# Patient Record
Sex: FEMALE | Race: WHITE | NOT HISPANIC OR LATINO | Employment: UNEMPLOYED | URBAN - METROPOLITAN AREA
[De-identification: names, ages, dates, MRNs, and addresses within clinical notes are randomized per-mention and may not be internally consistent; named-entity substitution may affect disease eponyms.]

---

## 2017-01-10 ENCOUNTER — ALLSCRIPTS OFFICE VISIT (OUTPATIENT)
Dept: OTHER | Facility: OTHER | Age: 27
End: 2017-01-10

## 2017-01-10 LAB — HCG, QUALITATIVE (HISTORICAL): NEGATIVE

## 2017-01-12 ENCOUNTER — APPOINTMENT (EMERGENCY)
Dept: RADIOLOGY | Facility: HOSPITAL | Age: 27
End: 2017-01-12
Payer: COMMERCIAL

## 2017-01-12 ENCOUNTER — HOSPITAL ENCOUNTER (EMERGENCY)
Facility: HOSPITAL | Age: 27
Discharge: LEFT BEFORE DISCHARGE | End: 2017-01-12
Attending: EMERGENCY MEDICINE
Payer: COMMERCIAL

## 2017-01-12 VITALS
OXYGEN SATURATION: 100 % | RESPIRATION RATE: 20 BRPM | TEMPERATURE: 98.3 F | DIASTOLIC BLOOD PRESSURE: 82 MMHG | HEART RATE: 124 BPM | SYSTOLIC BLOOD PRESSURE: 134 MMHG

## 2017-01-12 DIAGNOSIS — S29.8XXA BLUNT TRAUMA OF RIB, INITIAL ENCOUNTER: ICD-10-CM

## 2017-01-12 DIAGNOSIS — S09.93XA FACIAL TRAUMA, INITIAL ENCOUNTER: Primary | ICD-10-CM

## 2017-01-12 PROCEDURE — 99284 EMERGENCY DEPT VISIT MOD MDM: CPT

## 2017-04-18 ENCOUNTER — HOSPITAL ENCOUNTER (EMERGENCY)
Facility: HOSPITAL | Age: 27
End: 2017-04-19
Attending: EMERGENCY MEDICINE | Admitting: EMERGENCY MEDICINE
Payer: COMMERCIAL

## 2017-04-18 DIAGNOSIS — Z86.59 HISTORY OF COMMAND HALLUCINATIONS: ICD-10-CM

## 2017-04-18 DIAGNOSIS — R45.851 SUICIDAL IDEATION: Primary | ICD-10-CM

## 2017-04-18 LAB
AMPHETAMINES SERPL QL SCN: NEGATIVE
ANION GAP SERPL CALCULATED.3IONS-SCNC: 9 MMOL/L (ref 4–13)
BARBITURATES UR QL: NEGATIVE
BASOPHILS # BLD AUTO: 0.04 THOUSANDS/ΜL (ref 0–0.1)
BASOPHILS NFR BLD AUTO: 0 % (ref 0–1)
BENZODIAZ UR QL: NEGATIVE
BUN SERPL-MCNC: 21 MG/DL (ref 5–25)
CALCIUM SERPL-MCNC: 8.8 MG/DL (ref 8.3–10.1)
CHLORIDE SERPL-SCNC: 108 MMOL/L (ref 100–108)
CO2 SERPL-SCNC: 26 MMOL/L (ref 21–32)
COCAINE UR QL: NEGATIVE
CREAT SERPL-MCNC: 1.05 MG/DL (ref 0.6–1.3)
EOSINOPHIL # BLD AUTO: 0.14 THOUSAND/ΜL (ref 0–0.61)
EOSINOPHIL NFR BLD AUTO: 1 % (ref 0–6)
ERYTHROCYTE [DISTWIDTH] IN BLOOD BY AUTOMATED COUNT: 12.6 % (ref 11.6–15.1)
ETHANOL EXG-MCNC: 0 MG/DL
GFR SERPL CREATININE-BSD FRML MDRD: >60 ML/MIN/1.73SQ M
GLUCOSE SERPL-MCNC: 116 MG/DL (ref 65–140)
HCG UR QL: NEGATIVE
HCT VFR BLD AUTO: 35.3 % (ref 34.8–46.1)
HGB BLD-MCNC: 12 G/DL (ref 11.5–15.4)
LYMPHOCYTES # BLD AUTO: 3.41 THOUSANDS/ΜL (ref 0.6–4.47)
LYMPHOCYTES NFR BLD AUTO: 33 % (ref 14–44)
MCH RBC QN AUTO: 32.2 PG (ref 26.8–34.3)
MCHC RBC AUTO-ENTMCNC: 34 G/DL (ref 31.4–37.4)
MCV RBC AUTO: 95 FL (ref 82–98)
METHADONE UR QL: NEGATIVE
MONOCYTES # BLD AUTO: 0.57 THOUSAND/ΜL (ref 0.17–1.22)
MONOCYTES NFR BLD AUTO: 6 % (ref 4–12)
NEUTROPHILS # BLD AUTO: 6.03 THOUSANDS/ΜL (ref 1.85–7.62)
NEUTS SEG NFR BLD AUTO: 60 % (ref 43–75)
NRBC BLD AUTO-RTO: 0 /100 WBCS
OPIATES UR QL SCN: NEGATIVE
PCP UR QL: NEGATIVE
PLATELET # BLD AUTO: 161 THOUSANDS/UL (ref 149–390)
PMV BLD AUTO: 12.5 FL (ref 8.9–12.7)
POTASSIUM SERPL-SCNC: 3.4 MMOL/L (ref 3.5–5.3)
RBC # BLD AUTO: 3.73 MILLION/UL (ref 3.81–5.12)
SODIUM SERPL-SCNC: 143 MMOL/L (ref 136–145)
THC UR QL: POSITIVE
TSH SERPL DL<=0.05 MIU/L-ACNC: 0.41 UIU/ML (ref 0.36–3.74)
WBC # BLD AUTO: 10.21 THOUSAND/UL (ref 4.31–10.16)

## 2017-04-18 PROCEDURE — 85025 COMPLETE CBC W/AUTO DIFF WBC: CPT | Performed by: EMERGENCY MEDICINE

## 2017-04-18 PROCEDURE — 84443 ASSAY THYROID STIM HORMONE: CPT | Performed by: EMERGENCY MEDICINE

## 2017-04-18 PROCEDURE — 80307 DRUG TEST PRSMV CHEM ANLYZR: CPT | Performed by: EMERGENCY MEDICINE

## 2017-04-18 PROCEDURE — 82075 ASSAY OF BREATH ETHANOL: CPT | Performed by: EMERGENCY MEDICINE

## 2017-04-18 PROCEDURE — 36415 COLL VENOUS BLD VENIPUNCTURE: CPT | Performed by: EMERGENCY MEDICINE

## 2017-04-18 PROCEDURE — 80048 BASIC METABOLIC PNL TOTAL CA: CPT | Performed by: EMERGENCY MEDICINE

## 2017-04-18 PROCEDURE — 81025 URINE PREGNANCY TEST: CPT | Performed by: EMERGENCY MEDICINE

## 2017-04-18 RX ORDER — LORAZEPAM 0.5 MG/1
1 TABLET ORAL ONCE
Status: COMPLETED | OUTPATIENT
Start: 2017-04-18 | End: 2017-04-18

## 2017-04-18 RX ADMIN — LORAZEPAM 1 MG: 0.5 TABLET ORAL at 19:46

## 2017-04-19 VITALS
TEMPERATURE: 98 F | DIASTOLIC BLOOD PRESSURE: 64 MMHG | SYSTOLIC BLOOD PRESSURE: 118 MMHG | WEIGHT: 150 LBS | OXYGEN SATURATION: 96 % | RESPIRATION RATE: 20 BRPM | BODY MASS INDEX: 24.96 KG/M2 | HEART RATE: 82 BPM

## 2017-04-19 PROCEDURE — 99285 EMERGENCY DEPT VISIT HI MDM: CPT

## 2017-05-26 ENCOUNTER — GENERIC CONVERSION - ENCOUNTER (OUTPATIENT)
Dept: OTHER | Facility: OTHER | Age: 27
End: 2017-05-26

## 2017-09-18 ENCOUNTER — GENERIC CONVERSION - ENCOUNTER (OUTPATIENT)
Dept: OTHER | Facility: OTHER | Age: 27
End: 2017-09-18

## 2018-01-10 NOTE — MISCELLANEOUS
Message   Recorded as Task   Date: 05/26/2017 08:27 AM, Created By: Bita Hendricks   Task Name: Medical Complaint Callback   Assigned To: Carlo Hussein   Regarding Patient: Mingo Rothman, Status: Active   Comment:    Bita Hendricks - 26 May 2017 8:27 AM     TASK CREATED  Caller: Self; Medical Complaint; [phone] (Mobile Phone)  pt called - having an outbreak of bumps - needs rx of Aldara 5% called in for her  [facility] [address]    [phone]   Eva Helton - 26 May 2017 9:42 AM     TASK REASSIGNED: Previously Assigned To [facility],Team   Spoke to pt  Having recurrence of condyloma  Rx Aldara sent to pharmacy, pt will call in 2-3 months if not all better  Active Problems    1  Abdominal pain, colicky (197 1) (X23 66)   2  Acute opioid withdrawal (292 0,304 00) (F11 23)   3  Birth control (V25 9) (Z30 9)   4  Chronic GERD (530 81) (K21 9)   5  Chronic nausea (787 02) (R11 0)   6  Condyloma acuminata (078 11) (A63 0)   7  Contraceptives (V25 02)   8  Dermatitis (692 9) (L30 9)   9  Encounter for contraceptive management, unspecified contraceptive encounter type   10  Encounter for Depo-Provera contraception (V25 49) (Z30 42)   11  Encounter for initial prescription of contraceptives (V25 02) (Z30 019)   12  Encounter for routine gynecological examination (V72 31) (Z01 419)   13  Irritable bowel syndrome without diarrhea (564 1) (K58 9)   14  Need for prophylactic vaccination and inoculation against influenza (V04 81) (Z23)   15  Need for Tdap vaccination (V06 1) (Z23)   16  Nephrolithiasis (592 0) (N20 0)   17  Postpartum Depression (648 40)   18  Unsatisfactory cervical Papanicolaou smear (795 08) (R87 615)   19  Unsatisfactory cytology of cervical Papanicolaou smear (795 08) (R87 615)   20  Ureteric stone (592 1) (N20 1)    Current Meds   1  Aldara 5 % External Cream (Imiquimod); APPLY TO AFFECTED AREA IN THE   EVENING ON TUESDAY, THURSDAY, AND SATURDAY  [address] OFF IN THE MORNING;    Therapy: 31TWJ9116 to (Last RA:15OZL0772)  Requested for: 48TEL4398 Ordered   2  Dicyclomine HCl - 20 MG Oral Tablet (Bentyl); TAKE 1 TABLET EVERY 6 HOURS AS   NEEDED; Therapy: 25BFS1212 to (Evaluate:25Jun2016)  Requested for: 26Apr2016; Last   Rx:26Apr2016 Ordered   3  MedroxyPROGESTERone Acetate 150 MG/ML Intramuscular Suspension   (Depo-Provera); INJECT INTRAMUSCULARLY AS DIRECTED; Therapy: 25ITU4430 to (Last Rx:17Oct2016)  Requested for:   32Ncs6995 Ordered   4  Omeprazole 40 MG Oral Capsule Delayed Release; TAKE 1 CAPSULE DAILY; Therapy: 26Apr2016 to (Evaluate:80Xuy5536)  Requested for: 26Apr2016; Last   Rx:46Idd5073 Ordered    Allergies    1   No Known Drug Allergies    Signatures   Electronically signed by : Kenyatta Cline, [facility]; May 26 2017  9:55AM EST                       (Author)

## 2018-01-11 NOTE — MISCELLANEOUS
Message   Recorded as Task   Date: 03/01/2016 11:34 AM, Created By: Danae Colon   Task Name: Go to Result   Assigned To: Gayathri Guess   Regarding Patient: Teresa Arriaza, Status: In Progress   Comment:    Eva Helton - 01 Mar 2016 11:34 AM     TASK CREATED  pap unsatisfactory - not really sure why  Will need to bring her back in for a repap  Thanks! Sharifa Angel - 01 Mar 2016 11:40 AM     TASK IN PROGRESS   Sharifa Angel - 01 Mar 2016 11:41 AM     TASK EDITED  [place]   Viva Inches - 48 Mar 2016 11:04 AM     TASK EDITED  lmtcMonisha Sanders - 04 Mar 2016 1:16 PM     TASK EDITED  informed pt re repap    she wcb next week and reschedule        Active Problems    1  Acute opioid withdrawal (292 0,304 00) (F11 23)   2  Bacterial vaginosis (616 10,041 9) (N76 0,B96 89)   3  Condyloma acuminata (078 11) (A63 0)   4  Contraceptives (V25 02)   5  Dermatitis (692 9) (L30 9)   6  Encounter for initial prescription of contraceptives (V25 02) (Z30 019)   7  Encounter for routine gynecological examination (V72 31) (Z01 419)   8  GERD (gastroesophageal reflux disease) (530 81) (K21 9)   9  Heavy Bleeding Between Periods (Metrorrhagia) (626 6)   10  Nephrolithiasis (592 0) (N20 0)   11  Postpartum Depression (648 40)   12  Pregnancy (V22 2) (Z33 1)   13  Ureteric stone (592 1) (N20 1)   14  Vaginal discharge (623 5) (N89 8)   15  Vaginal Itching (625 8)   16  Vulvitis (616 10) (N76 2)   17  Vulvovaginitis candida albicans (112 1) (B37 3)    Current Meds   1  Aldara 5 % External Cream (Imiquimod); APPLY TO AFFECTED AREA IN THE   EVENING ON TUESDAY, THURSDAY, AND SATURDAY  [address] OFF IN THE MORNING; Therapy: 78QKC1573 to (Last Rx:99Nvr4267)  Requested for: 68Rtq0395 Ordered   2  Fluconazole 150 MG Oral Tablet (Diflucan); diflucan 150mg,,,take 1 today and repeat in   3 days; Therapy: 14Edt1689 to (Last Rx:42Mve1805)  Requested for: 95Omy5151 Ordered   3   Nystatin-Triamcinolone 137283-7 1 UNIT/GM-% External Ointment; apply sparingly to   affected area bid x 2 weeks; Therapy: 72Xqm1329 to (Nel Lennon)  Requested for: 64Qtg0545; Last   Rx:64Rhh4933 Ordered    Allergies    1   Tylenol with Codeine #3 TABS    Signatures   Electronically signed by : Josh Bobby, ; Mar  4 2016  1:17PM EST                       (Author)

## 2018-01-11 NOTE — MISCELLANEOUS
Provider Comments  Provider Comments:   Dear Kimberlyn Pelletier,    You missed an appointment on 2/15/2016 at 1:00 pm  We understand that many situations arise that occasionally prevents patients from keeping scheduled appointments  It is the policy of [facility] that patients notify us 24 hours in advance if unable to keep a scheduled appointment  Missed appointments jeopardize strong physician-patient relationships  The appointment you missed could have easily been made available to another patient if you had contacted us to cancel  We like to accommodate all of our patients, but when patients miss an appointment it prevents us from being able to help everyone  In the future, we request at least 24 hours notice of cancellation so we can make your appointment available to someone else in need         Sincerely,    The Physicians and Staff of [address] [place] [address] Primary Care        Signatures   Electronically signed by : ELIZABETH Hanson ; Feb 15 2016  4:36PM EST                       (Author)

## 2018-01-11 NOTE — MISCELLANEOUS
Message   Recorded as Task   Date: 03/03/2016 08:38 AM, Created By: Jayne Olivares   Task Name: Go to Result   Assigned To: José Luis Painting   Regarding Patient: Joetta Apley, Status: In Progress   Comment:    Eva Helton - 03 Mar 2016 8:38 AM     TASK CREATED  be sure pt returns for repap   Eva Helton - 07 Mar 2016 10:04 AM     TASK REASSIGNED: Previously Assigned To Eva Helton  pt's pap was unsatisfactory  I called her and asked her to return for a repap but have not heard back on this  Can you please call her and makes her she makes her appt? Thanks! Soraya Ricks - 07 Mar 2016 10:27 AM     TASK IN PROGRESS   Soraya Ricks - 07 Mar 2016 10:31 AM     TASK EDITED  Number provided does not work  Not able to reach her  Send certified letter  Needs to come in for pap  AP   Soraya Ricks - 07 Mar 2016 10:31 AM     TASK EDITED   Monisha Cunha - 08 Mar 2016 10:39 AM     TASK IN PROGRESS   Monisha Cunha - 08 Mar 2016 10:47 AM     TASK EDITED  number has been disconnected    needs registered letter   Monisha Cunha - 08 Mar 2016 11:05 AM     TASK EDITED  registered letter sent        Active Problems    1  Acute opioid withdrawal (292 0,304 00) (F11 23)   2  Bacterial vaginosis (616 10,041 9) (N76 0,B96 89)   3  Condyloma acuminata (078 11) (A63 0)   4  Contraceptives (V25 02)   5  Dermatitis (692 9) (L30 9)   6  Encounter for initial prescription of contraceptives (V25 02) (Z30 019)   7  Encounter for routine gynecological examination (V72 31) (Z01 419)   8  GERD (gastroesophageal reflux disease) (530 81) (K21 9)   9  Heavy Bleeding Between Periods (Metrorrhagia) (626 6)   10  Nephrolithiasis (592 0) (N20 0)   11  Postpartum Depression (648 40)   12  Pregnancy (V22 2) (Z33 1)   13  Ureteric stone (592 1) (N20 1)   14  Vaginal discharge (623 5) (N89 8)   15  Vaginal Itching (625 8)   16  Vulvitis (616 10) (N76 2)   17  Vulvovaginitis candida albicans (112 1) (B37 3)    Current Meds   1   Aldara 5 % External Cream (Imiquimod); APPLY TO AFFECTED AREA IN THE   EVENING ON TUESDAY, THURSDAY, AND SATURDAY  [address] OFF IN THE MORNING; Therapy: 28CQS9207 to (Last Rx:12Itv4942)  Requested for: 35Yki6886 Ordered   2  Fluconazole 150 MG Oral Tablet (Diflucan); diflucan 150mg,,,take 1 today and repeat in   3 days; Therapy: 68Hwg7154 to (Last Rx:22Feb2016)  Requested for: 10Stt9424 Ordered   3  Nystatin-Triamcinolone 747284-7 1 UNIT/GM-% External Ointment; apply sparingly to   affected area bid x 2 weeks; Therapy: 98Yhi3419 to (Shonda Garza)  Requested for: 22Feb2016; Last   Rx:70Uoj5772 Ordered    Allergies    1   Tylenol with Codeine #3 TABS    Signatures   Electronically signed by : Eusebia Patel, ; Mar  8 2016 11:06AM EST                       (Author)

## 2018-01-13 NOTE — PROGRESS NOTES
Chief Complaint  depo injection      History of Present Illness  Hospital Based Practices Required Assessment:   Pain Assessment   the patient states they do not have pain  (on a scale of 0 to 10, the patient rates the pain at 0 )   Abuse And Domestic Violence Screen    Yes, the patient is safe at home  The patient states no one is hurting them  Depression And Suicide Screen  No, the patient has not had thoughts of hurting themself  No, the patient has not felt depressed in the past 7 days  Prefered Language is  [facility]  Primary Language is  english  Active Problems    1  Abdominal pain, colicky (923 8) (Q28 51)   2  Acute opioid withdrawal (292 0,304 00) (F11 23)   3  Birth control (V25 9) (Z30 9)   4  Chronic GERD (530 81) (K21 9)   5  Chronic nausea (787 02) (R11 0)   6  Condyloma acuminata (078 11) (A63 0)   7  Contraceptives (V25 02)   8  Dermatitis (692 9) (L30 9)   9  Encounter for contraceptive management, unspecified contraceptive encounter type   (V25 9) (Z30 9)   10  Encounter for Depo-Provera contraception (V25 49) (Z30 42)   11  Encounter for initial prescription of contraceptives (V25 02) (Z30 019)   12  Encounter for routine gynecological examination (V72 31) (Z01 419)   13  Irritable bowel syndrome without diarrhea (564 1) (K58 9)   14  Need for prophylactic vaccination and inoculation against influenza (V04 81) (Z23)   15  Need for Tdap vaccination (V06 1) (Z23)   16  Nephrolithiasis (592 0) (N20 0)   17  Postpartum Depression (648 40)   18  Unsatisfactory cervical Papanicolaou smear (795 08) (R87 615)   19  Unsatisfactory cytology of cervical Papanicolaou smear (795 08) (R87 615)   20  Ureteric stone (592 1) (N20 1)    Current Meds   1  Aldara 5 % External Cream (Imiquimod); APPLY TO AFFECTED AREA IN THE   EVENING ON TUESDAY, THURSDAY, AND SATURDAY  [address] OFF IN THE MORNING; Therapy: 52KAA2840 to (Last Rx:96Gsn1789)  Requested for: 06Tzb0003 Ordered   2   Dicyclomine HCl - 20 MG Oral Tablet (Bentyl); TAKE 1 TABLET EVERY 6 HOURS AS   NEEDED; Therapy: 03FOM7274 to (Evaluate:25Jun2016)  Requested for: 26Apr2016; Last   Rx:26Apr2016 Ordered   3  MedroxyPROGESTERone Acetate 150 MG/ML Intramuscular Suspension   (Depo-Provera); INJECT INTRAMUSCULARLY AS DIRECTED; Therapy: 91CXU9364 to (Last Rx:17Oct2016)  Requested for:   17Oct2016 Ordered   4  MedroxyPROGESTERone Acetate 150 MG/ML Intramuscular Suspension   (Depo-Provera); INJECT INTRAMUSCULARLY EVERY 12 WEEKS AS DIRECTED;   Recurring Schedule:09Aug2016 to (Exp:11Jul2017); Status: IN PROGRESS -   Order Generated Ordered   5  Omeprazole 40 MG Oral Capsule Delayed Release; TAKE 1 CAPSULE DAILY; Therapy: 26Apr2016 to (Evaluate:08Lau3040)  Requested for: 26Apr2016; Last   Rx:26Apr2016 Ordered    Allergies    1   No Known Drug Allergies    Vitals  Signs    Systolic: 728  Diastolic: 60  Pain Scale: 0  Height: 5 ft 5 in  Weight: 161 lb 9 6 oz  BMI Calculated: 26 89  BSA Calculated: 1 81    Results/Data  Urine HCG- POC 25Oct2016 02:21PM Shoshana Pittman     Test Name Result Flag Reference   Urine HCG Negative         Plan  Encounter for Depo-Provera contraception    · MedroxyPROGESTERone Acetate 150 MG/ML Intramuscular Suspension   · Urine HCG- POC; Status:Complete - Retrospective By Protocol Authorization;   Done:  27FEG5969 02:21PM    Future Appointments    Date/Time Provider Specialty Site   01/10/2017 03:00 PM [facility], Injection Schedule  [facility] Alexei Atkinson 40     Signatures   Electronically signed by : ELIZABETH Oconnell ; Nov 10 2016  8:40AM EST

## 2018-01-15 NOTE — PROGRESS NOTES
Assessment    1  Chronic GERD (530 81) (K21 9)   2  Chronic nausea (787 02) (R11 0)   3  Irritable bowel syndrome without diarrhea (564 1) (K58 9)    Plan  Chronic GERD    · Avoid alcoholic beverages ; Status:Complete;   Done: 27RPC5165 12:32PM   Ordered; For:Chronic GERD; Ordered By:Mannie Izquierdo;   · Avoid foods and beverages that contain caffeine ; Status:Complete;   Done: 78QMR9004  12:32PM   Ordered; For:Chronic GERD; Ordered By:Mannie Izquierdo;   · Do not eat anything for at least 2 hours before going to bed ; Status:Complete;   Done:  87ESO1096 12:32PM   Ordered; For:Chronic GERD; Ordered By:Mannie Izquierdo;   · Raise the head of your bed to keep stomach acid from coming back up ;  Status:Complete;   Done: 00RSL0819 12:32PM   Ordered; For:Chronic GERD; Ordered By:Mannie Izquierdo;  Chronic nausea    · NM GASTRIC EMPTYING; Status:Active; Requested for:26Apr2016;    Perform:[facility]; JJR:81BRC0950; Last Updated By:Sheila Peterson; 4/26/2016 12:29:03 PM;Ordered; For:Chronic nausea; Ordered By:Mannie Izquierdo;    Discussion/Summary  Discussion Summary:   62-year-old woman with history of psychiatric disorder not on any treatment here for evaluation of chronic abdominal pain, nausea and reflux symptoms  I reviewed her prior workup including EGD, right upper quadrant sonogram and CT scan of the abdomen which were or unremarkable  She has gastroesophageal reflux disease -we reviewed reflux precautions as outlined above  Teaching material provided  I will change Pepcid to omeprazole 40 mg daily  Take thirty minutes before breakfast   Her abdominal pain is due to irritable bowel syndrome-I will start her on Bentyl 20 mg every six hours as needed for pain  If no improvement in her symptoms consider tricyclic antidepressant such as amitriptyline  For her chronic nausea GASTRIC emptying study to assess for gastroparesis  She'll follow up with me in as-needed basis        Chief Complaint  Chief Complaint Free Text Note Form: pt was seen in hosp by dr Gunjan Proctor left side stomach pain radiating into back nauses vomiting last week   Chief Complaint Chronic Condition [facility]: Patient is here today for follow up of chronic conditions described in HPI  History of Present Illness  HPI: 18-year-old female with history of psych disorder such as anxiety, depression and bipolar disorder here for evaluation of chronic abdominal pain and reflux symptoms  He reports left-sided abdominal pain radiating to her back  She also reports epigastric burning pain, nausea and occasional vomiting  She had extensive prior workup including normal EGD, abdominal ultrasound and CT scan  I reviewed her labs which are also unremarkable  She's currently taking Pepcid and Carafate was no significant improvement of her symptoms  She denied weight loss  She has history of illicit drug use including marijuana  She smokes cigarettes and drinks alcohol socially  History Reviewed: The history was obtained today from the patient and I agree with the documented history  Review of Systems  Complete-Female GI Adult:   Constitutional: No fever, no chills, feels well, no tiredness, no recent weight gain or weight loss  Eyes: No complaints of eye pain, no red eyes, no eyesight problems, no discharge, no dry eyes, no itching of eyes  ENT: no complaints of earache, no loss of hearing, no nose bleeds, no nasal discharge, no sore throat, no hoarseness  Cardiovascular: No complaints of slow heart rate, no fast heart rate, no chest pain, no palpitations, no leg claudication, no lower extremity edema  Respiratory: No complaints of shortness of breath, no wheezing, no cough, no SOB on exertion, no orthopnea, no PND  Gastrointestinal: abdominal pain, nausea and vomiting  Genitourinary: No complaints of dysuria, no incontinence, no pelvic pain, no dysmenorrhea, no vaginal discharge or bleeding     Musculoskeletal: No complaints of arthralgias, no myalgias, no joint swelling or stiffness, no limb pain or swelling  Integumentary: No complaints of skin rash or lesions, no itching, no skin wounds, no breast pain or lump  Neurological: No complaints of headache, no confusion, no convulsions, no numbness, no dizziness or fainting, no tingling, no limb weakness, no difficulty walking  Psychiatric: Not suicidal, no sleep disturbance, no anxiety or depression, no change in personality, no emotional problems  Endocrine: No complaints of proptosis, no hot flashes, no muscle weakness, no deepening of the voice, no feelings of weakness  Hematologic/Lymphatic: No complaints of swollen glands, no swollen glands in the neck, does not bleed easily, does not bruise easily  ROS Reviewed:   ROS reviewed  Active Problems    1  Abdominal pain, colicky (746 4) (N08 78)   2  Acute opioid withdrawal (292 0,304 00) (F11 23)   3  Birth control (V25 9) (Z30 9)   4  Chronic GERD (530 81) (K21 9)   5  Condyloma acuminata (078 11) (A63 0)   6  Contraceptives (V25 02)   7  Dermatitis (692 9) (L30 9)   8  Encounter for initial prescription of contraceptives (V25 02) (Z30 019)   9  Encounter for routine gynecological examination (V72 31) (Z01 419)   10  Need for prophylactic vaccination and inoculation against influenza (V04 81) (Z23)   11  Need for Tdap vaccination (V06 1) (Z23)   12  Nephrolithiasis (592 0) (N20 0)   13  Postpartum Depression (648 40)   14  Unsatisfactory cervical Papanicolaou smear (795 08) (R87 615)   15  Unsatisfactory cytology of cervical Papanicolaou smear (795 08) (R87 615)   16  Ureteric stone (592 1) (N20 1)    Past Medical History    1  History of Cannabis Abuse (305 20)   2  History of Cocaine Use (305 60)   3  Encounter for initial prescription of contraceptives (V25 02) (Z30 019)   4  History of Heavy Bleeding Between Periods (Metrorrhagia) (626 6)   5  History of condyloma acuminatum (V12 09) (Z86 19)   6   History of Opioid Abuse (305 50)   7  History of Previously Pregnant With 1  Normal Vaginal Deliveries   8  History of Vulvitis (616 10) (N76 2)  Active Problems And Past Medical History Reviewed: The active problems and past medical history were reviewed and updated today  Surgical History    1  History of Appendectomy   2  Contraceptives (V25 02)   3  History of Cystoscopy With Insertion Of Ureteral Stent Left   4  History of Knee Surgery   5  History of Oral Surgery Tooth Extraction  Surgical History Reviewed: The surgical history was reviewed and updated today  Family History  Mother    1  No pertinent family history  Maternal Grandmother    2  Family history of Diabetes Mellitus (V18 0)  Family History Reviewed: The family history was reviewed and updated today  Social History    · Denied: Being A Social Drinker   · Denied: Caffeine Use   · Current Every Day Smoker (305 1)   · Drug Use (305 90)  Social History Reviewed: The social history was reviewed and updated today  The social history was reviewed and is unchanged  Current Meds   1  Aldara 5 % External Cream; APPLY TO AFFECTED AREA IN THE EVENING ON TUESDAY,   THURSDAY, AND SATURDAY  [address] OFF IN THE MORNING; Therapy: 56XPI5976 to (Last Rx:67Rio8346)  Requested for: 47Cwp8682 Ordered   2  Carafate 1 GM/10ML Oral Suspension; Therapy: 99Jfb8898 to Recorded   3  NuvaRing 0 12-0 015 MG/24HR Vaginal Ring; USE AS DIRECTED; Therapy: 80Jbw2784 to (Evaluate:25Jan2017)  Requested for: 20Apr2016; Last   Rx:20Apr2016 Ordered  Medication List Reviewed: The medication list was reviewed and updated today  Allergies    1  No Known Drug Allergies    Physical Exam    Constitutional   General appearance: No acute distress, well appearing and well nourished  Eyes   Conjunctiva and lids: No swelling, erythema or discharge  Ears, Nose, Mouth, and Throat   Nasal mucosa, septum, and turbinates: Normal without edema or erythema      Oropharynx: Normal with no erythema, edema, exudate or lesions  Pulmonary   Respiratory effort: No increased work of breathing or signs of respiratory distress  Auscultation of lungs: Clear to auscultation  Cardiovascular   Auscultation of heart: Normal rate and rhythm, normal S1 and S2, without murmurs  Abdomen   Abdomen: Non-tender, no masses  Liver and spleen: No hepatomegaly or splenomegaly  Lymphatic   Palpation of lymph nodes in neck: No lymphadenopathy  Skin   Skin and subcutaneous tissue: Normal without rashes or lesions  Neurologic   Cranial nerves: Cranial nerves 2-12 intact  Psychiatric   Orientation to person, place, and time: Normal     Mood and affect: Normal          Results/Data  Results   * CT Abdomen/Pelvis Without Contrast 16Apr2015 11:16AM Brenton Iker     Test Name Result Flag Reference   CT Abd/Pelvis W/O (Report)     [address] [facility];;[place];[address]   04/16/2015 1057   04/16/2015 1116   N/A     CT ABDOMEN AND PELVIS WITHOUT IV CONTRAST - LOW DOSE RENAL STONE      INDICATION- Negative pregnancy test  Left-sided pain  Prior   appendectomy and prior hernia surgery  COMPARISON- May 7, 2013     TECHNIQUE- Low dose thin section CT examination of the abdomen and   pelvis was performed without intravenous or oral contrast according to   a protocol specifically designed to evaluate for urinary tract   calculus  Axial, sagittal and coronal reformatted images were   submitted for interpretation  Examination was performed utilizing   techniques to minimize radiation dose, including the use of dose   reduction software  Evaluation for pathology in the abdomen and   pelvis that is unrelated to urinary tract calculi is limited  FINDINGS-     RIGHT KIDNEY AND URETER-   No urinary tract calculi  No hydronephrosis or hydroureter  No perinephric collection  LEFT KIDNEY AND URETER-   No urinary tract calculi  No hydronephrosis or hydroureter   No perinephric collection  URINARY BLADDER-   Unremarkable  Limited evaluation demonstrates no significant abnormality of liver,   spleen, pancreas, or adrenal glands  No calcified gallstones or gallbladder wall thickening noted  No bowel obstruction  No ascites or lymphadenopathy  No acute fracture or destructive osseous lesion is identified  IMPRESSION-      No nephrolithiasis or hydronephrosis            Transcribed on- LLOYD Christy DO   [place] Radiologist- LLOYD Huizar DO   Electronically Signed- LLOYD Huizar DO   Released Date Time- 04/16/15 1130   ------------------------------------------------------------------------------   [id number]^VQQOZMILADIS Huggins   [id number]^KEVIN Huggins     Signatures   Electronically signed by : Abner Frey MD; Apr 26 2016 12:32PM EST                       (Author)

## 2018-01-15 NOTE — MISCELLANEOUS
Assessment    1  Nausea and vomiting in adult (787 01) (R11 2)   2  Abdominal pain, colicky (567 2) (K81 99)    Plan  Nausea and vomiting in adult    · Prochlorperazine Maleate 5 MG Oral Tablet (Compazine); TAKE 1 TABLET 3 TIMES  DAILY   Rx By: Rohan Strong; Dispense: 15 Days ; #:45 Tablet; Refill: 0; For: Nausea and vomiting in adult; NOEMI = N; Verified Transmission to [facility][id number] Last Updated By: System, [employer]; 4/14/2016 1:27:52 PM  Need for prophylactic vaccination and inoculation against influenza    · Stop: Fluzone Quadrivalent Intramuscular Suspension   For: Need for prophylactic vaccination and inoculation against influenza; Ordered By:Yuan Solorzano; Effective Date:14Apr2016; Last Updated By: Azalia Egan; 4/14/2016 1:29:14 PM  Need for Tdap vaccination    · Stop: Tdap (Adacel)   For: Need for Tdap vaccination; Ordered By:Yuan Solorzano; Effective Date:14Apr2016; Last Updated By: Azalia Egan; 4/14/2016 1:31:29 PM    Discussion/Summary  Discussion Summary:   Abdominal Pain - unknown etiology  CT scan while inpatient was unremarkable  EGD a few months ago was also normal  Patient becomes upset and angry at the suggestion that this may be related to her marijuana or opiate use  Patient was evaluated by GI yesterday and have encouraged her to follow-up as an outpatient  I encouraged the patient to follow-up with her gastroenterologist for further diagnostic testing which were discussed during her inpatient stay  Given the patient's history of opiate abuse, we'll hold off on prescribing any opiate medication for pain  Patient is declining any other pain medications at this time  Nausea/Vomiting - patient notes that Zofran does not relieve her nausea and is asking for another anti-medics  I will prescribe Compazine  Counseling Documentation With Imm: The patient was counseled regarding diagnostic results, instructions for management, risk factor reductions   total time of encounter was 22 minutes and 18 minutes was spent counseling  History of Present Illness  TCM Communication [facility]: The patient is being contacted for follow-up after hospitalization  Hospital course was discussed with the inpatient physician and records were reviewed  She was hospitalized at [facility]  The dates of hospitalization: 04/12-04/13/2016, date of admission: 04/12/2016, date of discharge: 04/13/2016  Diagnosis: abdominal pain  She was discharged to home  Counseling was provided to the patient  Communication performed and completed by Jessica Gross   HPI: Patient is a 39-year-old female who is presenting as a follow-up from her inpatient stay for abdominal pain and gastroenteritis  She notes that since her discharge, her abdominal pain has gotten better  Even though she was told she likely had gastroenteritis, she notes that she's had chronic abdominal pain for 2 years which is unchanged  She also has chronic nausea and vomiting which is unrelieved with Zofran  She has a history of noncompliance with medical treatment and opiate abuse, which she currently denies  Patient is tearful during my history and examination  She would like an answer for her chronic abdominal discomfort  She was instructed to follow-up with GI as an outpatient for further workup of her abdominal pain  CT scan of the abdomen done while inpatient was unremarkable, laboratory work revealed leukocytosis which improved with IV fluids on the day of discharge  Patient is asking for something to relieve her abdominal pain and nausea  Patient notes that her chronic complaints have led to an exacerbation of her anxiety  Patient does have a history of drug abuse, notably marijuana and opiates, which she does not feel are related to her symptoms  Review of Systems  Complete-Female:   Constitutional: feeling poorly and feeling tired, but no fever and no chills     Eyes: No complaints of eye pain, no red eyes, no eyesight problems, no discharge, no dry eyes, no itching of eyes  ENT: no complaints of earache, no loss of hearing, no nose bleeds, no nasal discharge, no sore throat, no hoarseness  Cardiovascular: No complaints of slow heart rate, no fast heart rate, no chest pain, no palpitations, no leg claudication, no lower extremity edema  Respiratory: No complaints of shortness of breath, no wheezing, no cough, no SOB on exertion, no orthopnea, no PND  Gastrointestinal: as noted in HPI  Genitourinary: No complaints of dysuria, no incontinence, no pelvic pain, no dysmenorrhea, no vaginal discharge or bleeding  Musculoskeletal: No complaints of arthralgias, no myalgias, no joint swelling or stiffness, no limb pain or swelling  Integumentary: No complaints of skin rash or lesions, no itching, no skin wounds, no breast pain or lump  Neurological: No complaints of headache, no confusion, no convulsions, no numbness, no dizziness or fainting, no tingling, no limb weakness, no difficulty walking  Psychiatric: anxiety, but not suicidal, no personality change, no sleep disturbances, no depression and no emotional problems  Active Problems    1  Acute opioid withdrawal (292 0,304 00) (F11 23)   2  Bacterial vaginosis (616 10,041 9) (N76 0,B96 89)   3  Condyloma acuminata (078 11) (A63 0)   4  Contraceptives (V25 02)   5  Dermatitis (692 9) (L30 9)   6  Encounter for initial prescription of contraceptives (V25 02) (Z30 019)   7  Encounter for routine gynecological examination (V72 31) (Z01 419)   8  GERD (gastroesophageal reflux disease) (530 81) (K21 9)   9  Heavy Bleeding Between Periods (Metrorrhagia) (626 6)   10  Nephrolithiasis (592 0) (N20 0)   11  Postpartum Depression (648 40)   12  Pregnancy (V22 2) (Z33 1)   13  Ureteric stone (592 1) (N20 1)   14  Vaginal discharge (623 5) (N89 8)   15  Vaginal Itching (625 8)   16  Vulvitis (616 10) (N76 2)   17   Vulvovaginitis candida albicans (112 1) (B37 3)    Past Medical History 1  History of Cannabis Abuse (305 20)   2  History of Cocaine Use (305 60)   3  Encounter for initial prescription of contraceptives (V25 02) (Z30 019)   4  History of condyloma acuminatum (V12 09) (Z86 19)   5  History of Opioid Abuse (305 50)   6  History of Previously Pregnant With 1  Normal Vaginal Deliveries    Surgical History    1  History of Appendectomy   2  Contraceptives (V25 02)   3  History of Cystoscopy With Insertion Of Ureteral Stent Left   4  History of Knee Surgery   5  History of Oral Surgery Tooth Extraction  Surgical History Reviewed: The surgical history was reviewed and updated today  Family History    1  No pertinent family history    2  Family history of Diabetes Mellitus (V18 0)  Family History Reviewed: The family history was reviewed and updated today  Social History    · Being A Social Drinker   · Caffeine Use   · Current Every Day Smoker (305 1)   · Drug Use (305 90)  Social History Reviewed: The social history was reviewed and is unchanged  Current Meds   1  Aldara 5 % External Cream; APPLY TO AFFECTED AREA IN THE EVENING ON TUESDAY,   THURSDAY, AND SATURDAY  [address] OFF IN THE MORNING; Therapy: 55YTF3135 to (Last Rx:[id number])  Requested for: 19Irp4836 Ordered   2  Fluconazole 150 MG Oral Tablet; diflucan 150mg,,,take 1 today and repeat in 3 days; Therapy: 77Yfr0561 to (Last Rx:57Cxd3860)  Requested for: 67Dmw9687 Ordered   3  Nystatin-Triamcinolone 998722-1 1 UNIT/GM-% External Ointment; apply sparingly to   affected area bid x 2 weeks; Therapy: 99Xfk5275 to (Dc Rideau)  Requested for: 96Sqd2734; Last   Rx:[id number] Ordered    Allergies    1   Tylenol with Codeine #3 TABS    Vitals  Signs [Data Includes: Current Encounter]   Recorded: 14Apr2016 01:15PM   Heart Rate: 74  Respiration: 18  Systolic: 574  Diastolic: 76  Height: 5 ft 4 25 in  Weight: 167 lb 6 oz  BMI Calculated: 28 51  BSA Calculated: 1 82  O2 Saturation: 100    Physical Exam    Constitutional   General appearance: No acute distress, well appearing and well nourished  Psychiatric   Orientation to person, place, and time: Normal     Mood and affect: Abnormal   tearful  Additional Exam:  Patient did not wish to be examined          Future Appointments    Date/Time Provider Specialty Site   04/26/2016 11:40 AM Manuel Bruce MD Gastroenterology Adult ST [address] [place] [address]   04/18/2016 01:20 PM Helio Montalvo [facility] Obstetrics/Gynecology   [address]     Signatures   Electronically signed by : ELIZABETH Tucker ; Apr 14 2016  1:38PM EST                       (Author)

## 2018-01-18 NOTE — MISCELLANEOUS
Message  CALLED PATIENT TO RESCHEDULE MISSED APPT  UNABLE TO SPEAK TO PATIENT, LEFT MESSAGE ON VOICEMAIL  Active Problems    1  Abdominal pain, colicky (612 1) (R00 64)   2  Acute opioid withdrawal (292 0,304 00) (F11 23)   3  Birth control (V25 9) (Z30 9)   4  Chronic GERD (530 81) (K21 9)   5  Chronic nausea (787 02) (R11 0)   6  Condyloma acuminata (078 11) (A63 0)   7  Contraceptives (V25 02)   8  Dermatitis (692 9) (L30 9)   9  Encounter for contraceptive management, unspecified contraceptive encounter type   10  Encounter for Depo-Provera contraception (V25 49) (Z30 42)   11  Encounter for initial prescription of contraceptives (V25 02) (Z30 019)   12  Encounter for routine gynecological examination (V72 31) (Z01 419)   13  Irritable bowel syndrome without diarrhea (564 1) (K58 9)   14  Need for prophylactic vaccination and inoculation against influenza (V04 81) (Z23)   15  Need for Tdap vaccination (V06 1) (Z23)   16  Nephrolithiasis (592 0) (N20 0)   17  Postpartum Depression (648 40)   18  Unsatisfactory cervical Papanicolaou smear (795 08) (R87 615)   19  Unsatisfactory cytology of cervical Papanicolaou smear (795 08) (R87 615)   20  Ureteric stone (592 1) (N20 1)    Current Meds   1  Aldara 5 % External Cream (Imiquimod); APPLY TO AFFECTED AREA IN THE   EVENING ON TUESDAY, THURSDAY, AND SATURDAY  [address] OFF IN THE MORNING; Therapy: 72EJO7895 to (Last Rx:[id number])  Requested for: 07TAK3395 Ordered   2  Dicyclomine HCl - 20 MG Oral Tablet (Bentyl); TAKE 1 TABLET EVERY 6 HOURS AS   NEEDED; Therapy: 96KYS2750 to (Evaluate:75Rai0160)  Requested for: 12Mau3061; Last   Rx:[id number] Ordered   3  MedroxyPROGESTERone Acetate 150 MG/ML Intramuscular Suspension   (Depo-Provera); INJECT INTRAMUSCULARLY AS DIRECTED; Therapy: 95UVT5724 to (Last Rx:[id number])  Requested for:   39Euj8059 Ordered   4  Omeprazole 40 MG Oral Capsule Delayed Release; TAKE 1 CAPSULE DAILY;    Therapy: 26Apr2016 to (Evaluate:50Dtz2909)  Requested for: 26Apr2016; Last   Rx:[id number] Ordered    Allergies    1   No Known Drug Allergies    Signatures   Electronically signed by : Tejas Gupta, ; Sep 18 2017  3:37PM EST                       (Author)

## 2018-08-30 ENCOUNTER — OFFICE VISIT (OUTPATIENT)
Dept: OBGYN CLINIC | Facility: HOSPITAL | Age: 28
End: 2018-08-30
Payer: COMMERCIAL

## 2018-08-30 VITALS
HEIGHT: 65 IN | HEART RATE: 99 BPM | DIASTOLIC BLOOD PRESSURE: 78 MMHG | WEIGHT: 148 LBS | SYSTOLIC BLOOD PRESSURE: 123 MMHG | BODY MASS INDEX: 24.66 KG/M2

## 2018-08-30 DIAGNOSIS — Z30.42 ENCOUNTER FOR MANAGEMENT AND INJECTION OF DEPO-PROVERA: Primary | ICD-10-CM

## 2018-08-30 LAB — SL AMB POCT URINE HCG: NORMAL

## 2018-08-30 PROCEDURE — 96372 THER/PROPH/DIAG INJ SC/IM: CPT | Performed by: OBSTETRICS & GYNECOLOGY

## 2018-08-30 PROCEDURE — 99214 OFFICE O/P EST MOD 30 MIN: CPT | Performed by: OBSTETRICS & GYNECOLOGY

## 2018-08-30 PROCEDURE — 81025 URINE PREGNANCY TEST: CPT | Performed by: OBSTETRICS & GYNECOLOGY

## 2018-08-30 RX ORDER — MEDROXYPROGESTERONE ACETATE 150 MG/ML
150 INJECTION, SUSPENSION INTRAMUSCULAR ONCE
Status: COMPLETED | OUTPATIENT
Start: 2018-08-30 | End: 2018-08-30

## 2018-08-30 RX ORDER — MEDROXYPROGESTERONE ACETATE 150 MG/ML
150 INJECTION, SUSPENSION INTRAMUSCULAR
Qty: 1 ML | Refills: 0 | Status: SHIPPED | OUTPATIENT
Start: 2018-08-30

## 2018-08-30 RX ADMIN — MEDROXYPROGESTERONE ACETATE 150 MG: 150 INJECTION, SUSPENSION INTRAMUSCULAR at 16:16

## 2018-08-30 NOTE — PROGRESS NOTES
Jamal Blackwell is a 33 y o  female who presents for contraception counseling for a Depo Provera restart  She took Depo for 2 years in high school and one year previously  She has a new romantic partner but they have not started being sexually active  Depo makes her amenorrheic and she likes having to have the responsibility of coming to clinic for the injections regularly  However she also prefers a contraceptive method that lasts her multiple months at a time  Last pap smear 2016,   Periods are regular, every month, lasting 1 week  She is a current smoker    Review of Systems  Pertinent items are noted in HPI  Objective      /78 (BP Location: Left arm, Patient Position: Sitting)   Pulse 99   Ht 5' 5" (1 651 m)   Wt 67 1 kg (148 lb)   LMP 08/25/2018 (Exact Date)   BMI 24 63 kg/m²     General:   alert and oriented, in no acute distress   Heart: regular rate and rhythm, S1, S2 normal, no murmur, click, rub or gallop   Lungs: clear to auscultation bilaterally   Abdomen: soft, non-tender, without masses or organomegaly   Lab Review  Urine pregnancy test  - Negative    Assessment     33 y o , starting Depo-Provera injections, no contraindications  Time spent counseling 15 minutes    Plan    Contraceptive options were reviewed, including hormonal methods, both combination (pill, patch, vaginal ring) and progesterone-only (pill, Depo Provera and Nexplanon), intrauterine devices (Mirena, Elsi and Paraguard), barrier methods (condoms, diaphragm) and male/female sterilization  The mechanisms, risks, benefits and side effects of all methods were discussed  All questions have been answered to her satisfaction  Patient would like to start Depo Provera  Discussed with Dr Kapoor Alu

## 2018-08-30 NOTE — PATIENT INSTRUCTIONS
Medroxyprogesterone (By injection)   Medroxyprogesterone (ku-sxld-ln-proe-CIRILO-ter-one)  Prevents pregnancy  Also treats endometriosis and is used with other medicines to help relieve symptoms of cancer, including uterine or kidney cancer  Brand Name(s): Depo-Provera, Depo-Provera Contraceptive, Depo-SubQ Provera 104   There may be other brand names for this medicine  When This Medicine Should Not Be Used: This medicine is not right for everyone  You should not receive it if you had an allergic reaction to medroxyprogesterone or if you have a history of breast cancer or blood clots (including heart attack or stroke)  In most cases, you should not use this medicine while you are pregnant  How to Use This Medicine:   Injectable  · A nurse or other health provider will give you this medicine  This medicine is given as a shot into a muscle or just under the skin  · Your exact treatment schedule depends on the reason you are using this medicine  You doctor will explain your personal schedule  ¨ For treatment of cancer symptoms, you may start with a shot once per week  You may need fewer shots as your treatment goes forward  ¨ For birth control or endometriosis, you will need a shot every 3 months (13 weeks)  Read and follow the patient instructions that come with this medicine  Talk to your doctor or pharmacist if you have any questions  ¨ You might need to have the first shot during the first 5 days of your normal menstrual period, to make sure you are not pregnant  If you have just had a baby, you may receive a shot 5 days after birth if you are not breastfeeding or 6 weeks after birth if you are breastfeeding  · Missed dose: You must receive a shot every 3 months if you want to prevent pregnancy  Talk to your doctor or pharmacist if you do not receive your medicine on time, because you may need another form of birth control    Drugs and Foods to Avoid:   Ask your doctor or pharmacist before using any other medicine, including over-the-counter medicines, vitamins, and herbal products  · Some foods and medicines can affect how medroxyprogesterone works  Tell your doctor if you are using any of the following:  ¨ Aminoglutethimide, bosentan, carbamazepine, felbamate, griseofulvin, nefazodone, oxcarbazepine, phenobarbital, phenytoin, rifabutin, rifampin, rifapentine, Romy's wort, topiramate  ¨ Medicine to treat an infection (including clarithromycin, itraconazole, ketoconazole, telithromycin, voriconazole)  ¨ Medicine to treat HIV/AIDS (including atazanavir, indinavir, nelfinavir, ritonavir, saquinavir)  Warnings While Using This Medicine:   · Tell your doctor right away if you think you have become pregnant  · Tell your doctor if you are breastfeeding or if you have liver disease, kidney disease, asthma, diabetes, heart disease, seizures, migraine headaches, an eating disorder, osteoporosis, or a history of depression  Tell your doctor if you smoke  · This medicine may cause the following problems:  ¨ Blood clots, which could lead to stroke, heart attack, or other serious problems  ¨ Possible increased risk of breast cancer  ¨ Weak or thin bones, especially with long-term use  · You should not use this medicine for long-term birth control unless you cannot use any other form of birth control  · This medicine will not protect you from HIV/AIDS or other sexually transmitted diseases  · Tell any doctor or dentist who treats you that you are using this medicine  This medicine may affect certain medical test results  · Your doctor will check your progress and the effects of this medicine at regular visits  Keep all appointments    Possible Side Effects While Using This Medicine:   Call your doctor right away if you notice any of these side effects:  · Allergic reaction: Itching or hives, swelling in your face or hands, swelling or tingling in your mouth or throat, chest tightness, trouble breathing  · Chest pain, trouble breathing, or coughing up blood  · Dark urine or pale stools, nausea, vomiting, loss of appetite, stomach pain, yellow skin or eyes  · Heavy or nonstop vaginal bleeding  · Loss of vision, double vision  · Numbness or weakness on one side of your body, sudden or severe headache, problems with vision, speech, or walking  · Severe stomach pain or cramps  If you notice these less serious side effects, talk with your doctor:   · Headache  · Light or missed monthly periods, spotting between periods  · Nervousness or dizziness  · Pain, redness, burning, swelling, or a lump under your skin where the shot was given  · Weight gain  If you notice other side effects that you think are caused by this medicine, tell your doctor  Call your doctor for medical advice about side effects  You may report side effects to FDA at 9-662-FDA-6401  © 2017 [address] Information is for End User's use only and may not be sold, redistributed or otherwise used for commercial purposes  The above information is an  only  It is not intended as medical advice for individual conditions or treatments  Talk to your doctor, nurse or pharmacist before following any medical regimen to see if it is safe and effective for you

## 2018-11-21 ENCOUNTER — CLINICAL SUPPORT (OUTPATIENT)
Dept: OBGYN CLINIC | Facility: HOSPITAL | Age: 28
End: 2018-11-21
Payer: COMMERCIAL

## 2018-11-21 DIAGNOSIS — Z30.42 ENCOUNTER FOR DEPO-PROVERA CONTRACEPTION: Primary | ICD-10-CM

## 2018-11-21 LAB — SL AMB POCT URINE HCG: NEGATIVE

## 2018-11-21 PROCEDURE — 81025 URINE PREGNANCY TEST: CPT

## 2018-11-21 PROCEDURE — 96372 THER/PROPH/DIAG INJ SC/IM: CPT

## 2018-11-21 RX ORDER — MEDROXYPROGESTERONE ACETATE 150 MG/ML
150 INJECTION, SUSPENSION INTRAMUSCULAR
Status: DISCONTINUED | OUTPATIENT
Start: 2018-11-21 | End: 2019-04-19 | Stop reason: HOSPADM

## 2018-11-21 RX ADMIN — MEDROXYPROGESTERONE ACETATE 150 MG: 150 INJECTION, SUSPENSION INTRAMUSCULAR at 15:50

## 2018-11-21 NOTE — PROGRESS NOTES
Depo-Provera      [x]   Patient provided box     Syringe    Last  Annual/Pap Date: 04/20/2016 Pap   Last Depo date: 08/30/2018   Side effects: None   HCG; if applicable: Negative, done in office today   Given by: Dwayne Amaya RN   Site: Right deltoid   Next appt  due: Due for Annual, 3 months for Depo   Calcium supplement daily teaching, condoms for 2 weeks following first injection dose  Pt said she was in a hurry, she will call us next week to make her appt for Annual  I explained to pt that she can ask for refills for her Depo injections at her Annual appt

## 2018-11-25 ENCOUNTER — HOSPITAL ENCOUNTER (EMERGENCY)
Facility: HOSPITAL | Age: 28
Discharge: HOME | End: 2018-11-25
Attending: EMERGENCY MEDICINE | Admitting: EMERGENCY MEDICINE
Payer: COMMERCIAL

## 2018-11-25 VITALS
RESPIRATION RATE: 20 BRPM | HEIGHT: 65 IN | OXYGEN SATURATION: 98 % | DIASTOLIC BLOOD PRESSURE: 64 MMHG | HEART RATE: 96 BPM | SYSTOLIC BLOOD PRESSURE: 131 MMHG | WEIGHT: 145 LBS | BODY MASS INDEX: 24.16 KG/M2 | TEMPERATURE: 97.7 F

## 2018-11-25 DIAGNOSIS — M79.642 BILATERAL HAND PAIN: ICD-10-CM

## 2018-11-25 DIAGNOSIS — R20.0 BILATERAL HAND NUMBNESS: Primary | ICD-10-CM

## 2018-11-25 DIAGNOSIS — M79.641 BILATERAL HAND PAIN: ICD-10-CM

## 2018-11-25 LAB
ALBUMIN SERPL BCP-MCNC: 3.6 G/DL (ref 3.5–5)
ALP SERPL-CCNC: 64 U/L (ref 46–116)
ALT SERPL W P-5'-P-CCNC: 15 U/L (ref 12–78)
ANION GAP SERPL CALCULATED.3IONS-SCNC: 8 MMOL/L (ref 4–13)
AST SERPL W P-5'-P-CCNC: 16 U/L (ref 5–45)
BILIRUB SERPL-MCNC: 0.62 MG/DL (ref 0.2–1)
BUN SERPL-MCNC: 12 MG/DL (ref 5–25)
CALCIUM SERPL-MCNC: 9.6 MG/DL (ref 8.3–10.1)
CHLORIDE SERPL-SCNC: 108 MMOL/L (ref 100–108)
CO2 SERPL-SCNC: 21 MMOL/L (ref 21–32)
CREAT SERPL-MCNC: 1.02 MG/DL (ref 0.6–1.3)
GFR SERPL CREATININE-BSD FRML MDRD: 75 ML/MIN/1.73SQ M
GLUCOSE SERPL-MCNC: 75 MG/DL (ref 65–140)
MAGNESIUM SERPL-MCNC: 2 MG/DL (ref 1.6–2.6)
PHOSPHATE SERPL-MCNC: 2 MG/DL (ref 2.7–4.5)
POTASSIUM SERPL-SCNC: 3.5 MMOL/L (ref 3.5–5.3)
PROT SERPL-MCNC: 7.1 G/DL (ref 6.4–8.2)
SODIUM SERPL-SCNC: 137 MMOL/L (ref 136–145)
T4 FREE SERPL-MCNC: 0.99 NG/DL (ref 0.76–1.46)
TSH SERPL DL<=0.05 MIU/L-ACNC: 0.71 UIU/ML (ref 0.36–3.74)

## 2018-11-25 PROCEDURE — 99284 EMERGENCY DEPT VISIT MOD MDM: CPT

## 2018-11-25 PROCEDURE — 83735 ASSAY OF MAGNESIUM: CPT | Performed by: EMERGENCY MEDICINE

## 2018-11-25 PROCEDURE — 84100 ASSAY OF PHOSPHORUS: CPT | Performed by: EMERGENCY MEDICINE

## 2018-11-25 PROCEDURE — 96372 THER/PROPH/DIAG INJ SC/IM: CPT

## 2018-11-25 PROCEDURE — 80053 COMPREHEN METABOLIC PANEL: CPT | Performed by: EMERGENCY MEDICINE

## 2018-11-25 PROCEDURE — 84443 ASSAY THYROID STIM HORMONE: CPT | Performed by: EMERGENCY MEDICINE

## 2018-11-25 PROCEDURE — 36415 COLL VENOUS BLD VENIPUNCTURE: CPT | Performed by: EMERGENCY MEDICINE

## 2018-11-25 PROCEDURE — 84439 ASSAY OF FREE THYROXINE: CPT | Performed by: EMERGENCY MEDICINE

## 2018-11-25 RX ORDER — ACETAMINOPHEN 325 MG/1
975 TABLET ORAL ONCE
Status: COMPLETED | OUTPATIENT
Start: 2018-11-25 | End: 2018-11-25

## 2018-11-25 RX ORDER — KETOROLAC TROMETHAMINE 30 MG/ML
15 INJECTION, SOLUTION INTRAMUSCULAR; INTRAVENOUS ONCE
Status: COMPLETED | OUTPATIENT
Start: 2018-11-25 | End: 2018-11-25

## 2018-11-25 RX ADMIN — ACETAMINOPHEN 975 MG: 325 TABLET, FILM COATED ORAL at 13:44

## 2018-11-25 RX ADMIN — KETOROLAC TROMETHAMINE 15 MG: 30 INJECTION, SOLUTION INTRAMUSCULAR at 13:45

## 2018-11-25 NOTE — DISCHARGE INSTRUCTIONS
Carpal Tunnel Syndrome   WHAT YOU SHOULD KNOW:   Carpal tunnel syndrome (CTS) is a condition where there is increased pressure on the median nerve in the wrist  The median nerve controls muscles and feeling in the hand  Pressure may come from overuse and swelling of ligaments in the wrist    INSTRUCTIONS:   Medicines:   · NSAIDs:  These medicines decrease swelling and pain  NSAIDs are available without a doctor's order  Ask your primary healthcare provider which medicine is right for you and how much to take  Take as directed  NSAIDs can cause stomach bleeding or kidney problems if not taken correctly  · Take your medicine as directed  Call your healthcare provider if you think your medicine is not helping or if you have side effects  Tell him if you are allergic to any medicine  Keep a list of the medicines, vitamins, and herbs you take  Include the amounts, and when and why you take them  Bring the list or the pill bottles to follow-up visits  Carry your medicine list with you in case of an emergency  Follow up with your healthcare provider as directed:  Write down your questions so you remember to ask them during your visits  Manage your symptoms:   · Use ice:  Ice helps decrease swelling and pain in your wrist  Ice may also help prevent tissue damage  Use an ice pack or put crushed ice in a plastic bag  Cover the ice pack with a towel and place it on your wrist for 15 to 20 minutes every hour  · Get physical and occupational therapy:  Physical therapists will show you ways to exercise and strengthen your wrist  Occupational therapists will show you safe ways to use your wrist while you do your usual activities  · Rest your hands:  Let your hands rest for a short time between repetitive motions, such as typing  If you feel pain, stop what you are doing and gently massage your wrist and hand  · Use a wrist splint: This keeps your wrist straight or in a slightly bent position   A wrist splint decreases pressure on the median nerve by letting your wrist rest  You may need to wear the splint for up to 8 weeks  You may need to wear your wrist splint at night  · Evaluate your work habits:  Ask about ways to modify your work to help decrease your symptoms  Contact your primary healthcare provider if:   · Your symptoms are worse than before  · You have questions or concerns about your condition or care  Return to the emergency department if:   · You suddenly lose feeling and cannot move your hand  · Your hand suddenly changes color  © 2014 [address] [employer] is for End User's use only and may not be sold, redistributed or otherwise used for commercial purposes  All illustrations and images included in CareNotes® are the copyrighted property of A [employer]  or [employer]  The above information is an  only  It is not intended as medical advice for individual conditions or treatments  Talk to your doctor, nurse or pharmacist before following any medical regimen to see if it is safe and effective for you

## 2018-11-25 NOTE — ED PROVIDER NOTES
History  Chief Complaint   Patient presents with    Numbness     patient reports intermittent numbness to b/l hands for the past 3 months, constant for the past 3 days, has not been seen for this prior to today     44-year-old female with history of bipolar disorder, GERD, remote history of polysubstance abuse, not on medications presents for bilateral hand pain and numbness  Patient says that symptoms initially started 3 months ago, but were intermittent and occurred only at night lasting several hours at a time  Three days ago symptoms became constant, occurring both during the day and at night  Her pain and numbness has also been progressively worsening  She has had a some weakness in her hands and has had difficulty holding on to objects, often dropping them  Pain involves most of the hand sparing the pinkies, but is most severe to the middle 3 digits bilaterally  Pain is distributed throughout the entire phalanges, not particularly worse over the joints  No history of back or neck pain or injury  No other extremity pain or numbness, no other focal neurological deficits  Denies fevers, chills, nausea, vomiting, swelling, or any additional symptoms or complaints  No history of IV drug use, but admits to prior history of cocaine and alcohol abuse  Has never been evaluated for this before  Prior to Admission Medications   Prescriptions Last Dose Informant Patient Reported? Taking?    medroxyPROGESTERone (DEPO-PROVERA) 150 mg/mL injection   No No   Sig: Inject 1 mL (150 mg total) into a muscle every 3 (three) months      Facility-Administered Medications Last Administration Doses Remaining   medroxyPROGESTERone acetate (DEPO-PROVERA SYRINGE) IM injection 150 mg 11/21/2018  3:50 PM           Past Medical History:   Diagnosis Date    Bacterial vaginosis     [facility]     [facility]aine abuse [facility] )     Condyloma acuminata     Gastritis     GERD (gastroesophageal reflux disease)     Heroin abuse ([facility] )     Narcotic abuse ([facility] )     Nephrolithiasis     Opioid withdrawal ([facility] )     Suicidal ideations     Vulvitis        Past Surgical History:   Procedure Laterality Date    APPENDECTOMY      HERNIA REPAIR      KNEE SURGERY      URETERAL STENT PLACEMENT         Family History   Problem Relation Age of Onset    Diabetes Maternal Grandmother      I have reviewed and agree with the history as documented  Social History   Substance Use Topics    Smoking status: Current Some Day Smoker     Packs/day: 0 30     Years: 10 00     Types: Cigarettes    Smokeless tobacco: Never Used    Alcohol use No      Comment: rarely; Most recently a couple of shots and few beers        Review of Systems   Constitutional: Negative  Negative for chills and fever  HENT: Negative  Negative for rhinorrhea  Eyes: Negative  Respiratory: Negative for cough and shortness of breath  Cardiovascular: Negative  Negative for chest pain and leg swelling  Gastrointestinal: Negative  Negative for abdominal pain, diarrhea, nausea and vomiting  Genitourinary: Negative  Negative for dysuria, flank pain and frequency  Musculoskeletal: Negative for back pain and neck pain  Bilateral hand pain/numbness   Skin: Negative  Negative for rash  Neurological: Negative  Negative for light-headedness and headaches  All other systems reviewed and are negative        Physical Exam  ED Triage Vitals   Temperature Pulse Respirations Blood Pressure SpO2   11/25/18 1306 11/25/18 1306 11/25/18 1306 11/25/18 1306 11/25/18 1306   97 7 °F (36 5 °C) 60 20 (!) 157/116 100 %      Temp Source Heart Rate Source Patient Position - Orthostatic VS BP Location FiO2 (%)   11/25/18 1306 11/25/18 1306 11/25/18 1306 11/25/18 1412 --   Tympanic Monitor Sitting Right arm       Pain Score       11/25/18 1306       8           Orthostatic Vital Signs  Vitals:    11/25/18 1306 11/25/18 1412 11/25/18 1415   BP: (!) 157/116 131/64 131/64   Pulse: 60 87 96   Patient Position - Orthostatic VS: Sitting Lying        Physical Exam   Constitutional: She is oriented to person, place, and time  She appears well-developed and well-nourished  No distress  HENT:   Head: Normocephalic and atraumatic  Mouth/Throat: Oropharynx is clear and moist    Eyes: Pupils are equal, round, and reactive to light  EOM are normal    Neck: Normal range of motion  Neck supple  Cardiovascular: Normal rate, regular rhythm, normal heart sounds and intact distal pulses  Exam reveals no gallop and no friction rub  No murmur heard  Pulmonary/Chest: Effort normal and breath sounds normal  No respiratory distress  She has no wheezes  She has no rales  Abdominal: Soft  There is no tenderness  There is no rebound and no guarding  Musculoskeletal: Normal range of motion  She exhibits tenderness  She exhibits no edema  Tender diffusely throughout bilateral hands, more severe to middle 3 phalanges both on dorsal and palmar surface, sparing pinkies  Unable to distinguish sharp/dull  Decreased  strength and finger abduction/adduction  Brachioradialis reflexes intact  Wrist and elbow exam are normal  2+ radial pulses  Neurological: She is alert and oriented to person, place, and time  She displays normal reflexes  No cranial nerve deficit  She exhibits normal muscle tone  Coordination normal    Clear fluent speech  No pronator drift  Normal finger nose finger  Normal heel to shin  No dysdiadochokinesis  Visual fields intact  Skin: Skin is warm and dry  Capillary refill takes less than 2 seconds  Psychiatric: She has a normal mood and affect  Nursing note and vitals reviewed        ED Medications  Medications   ketorolac (TORADOL) injection 15 mg (15 mg Intramuscular Given 11/25/18 1345)   acetaminophen (TYLENOL) tablet 975 mg (975 mg Oral Given 11/25/18 1344)       Diagnostic Studies  Results Reviewed     Procedure Component Value Units Date/Time Comprehensive metabolic panel [[id number]] Collected:  11/25/18 1403    Lab Status:  Final result Specimen:  Blood from Arm, Right Updated:  11/25/18 1459     Sodium 137 mmol/L      Potassium 3 5 mmol/L      Chloride 108 mmol/L      CO2 21 mmol/L      ANION GAP 8 mmol/L      BUN 12 mg/dL      Creatinine 1 02 mg/dL      Glucose 75 mg/dL      Calcium 9 6 mg/dL      AST 16 U/L      ALT 15 U/L      Alkaline Phosphatase 64 U/L      Total Protein 7 1 g/dL      Albumin 3 6 g/dL      Total Bilirubin 0 62 mg/dL      eGFR 75 ml/min/1 73sq m     Narrative:         National Kidney Disease Education Program recommendations are as follows:  GFR calculation is accurate only with a steady state creatinine  Chronic Kidney disease less than 60 ml/min/1 73 sq  meters  Kidney failure less than 15 ml/min/1 73 sq  meters  Phosphorus [[id number]]  (Abnormal) Collected:  11/25/18 1403    Lab Status:  Final result Specimen:  Blood from Arm, Right Updated:  11/25/18 1439     Phosphorus 2 0 (L) mg/dL     TSH [[id number]]  (Normal) Collected:  11/25/18 1403    Lab Status:  Final result Specimen:  Blood from Arm, Right Updated:  11/25/18 1439     TSH 3RD GENERATON 0 707 uIU/mL     Narrative:         Patients undergoing fluorescein dye angiography may retain small amounts of fluorescein in the body for 48-72 hours post procedure  Samples containing fluorescein can produce falsely depressed TSH values  If the patient had this procedure,a specimen should be resubmitted post fluorescein clearance            The recommended reference ranges for TSH during pregnancy are as follows:  First trimester 0 1 to 2 5 uIU/mL  Second trimester  0 2 to 3 0 uIU/mL  Third trimester 0 3 to 3 0 uIU/m      T4, free [[id number]]  (Normal) Collected:  11/25/18 1403    Lab Status:  Final result Specimen:  Blood from Arm, Right Updated:  11/25/18 1439     Free T4 0 99 ng/dL     Magnesium [[id number]]  (Normal) Collected:  11/25/18 1403    Lab Status:  Final result Specimen: Blood from Arm, Right Updated:  11/25/18 1439     Magnesium 2 0 mg/dL                  No orders to display         Procedures  Procedures      Phone Consults  ED Phone Contact    ED Course                               MDM  Number of Diagnoses or Management Options  Bilateral hand numbness:   Bilateral hand pain:   Diagnosis management comments: 78-year-old female presents for acute on chronic bilateral hand pain and numbness, largely in median nerve distribution  No electrolyte abnormalities  Nothing to suggest central or cervical process  Suspect carpal tunnel syndrome and advised splinting at night, NSAIDs, and provided hand surgery referral  ED return precautions provided should symptoms worsen and patient can otherwise follow up outpatient  Patient expresses an understanding and agreement with the plan and remains in good condition for discharge  CritCare Time    Disposition  Final diagnoses:   Bilateral hand numbness   Bilateral hand pain     Time reflects when diagnosis was documented in both MDM as applicable and the Disposition within this note     Time User Action Codes Description Comment    11/25/2018  2:40 PM Nae Garcia [R20 0] Bilateral hand numbness     11/25/2018  2:40 PM Nae Garcia [id number],  [id number]] Bilateral hand pain       ED Disposition     ED Disposition Condition Comment    Discharge  [address] discharge to home/self care      Condition at discharge: Good        Follow-up Information     Follow up With Specialties Details Why Contact Info Additional Information    [facility] Call in 1 day To make an appointment [place] [phone]       [facility] [address] Emergency Department Emergency Medicine Go to If symptoms worsen [address]  [phone]  ED, [address] [place] [address], [phone]          Discharge Medication List as of 11/25/2018  2:47 PM CONTINUE these medications which have NOT CHANGED    Details   medroxyPROGESTERone (DEPO-PROVERA) 150 mg/mL injection Inject 1 mL (150 mg total) into a muscle every 3 (three) months, Starting Thu 8/30/2018, Normal           No discharge procedures on file  ED Provider  Attending physically available and evaluated [place]  I managed the patient along with the ED Attending      Electronically Signed by         Amber Villeda MD  11/25/18 2371

## 2018-11-25 NOTE — ED ATTENDING ATTESTATION
Yousif Jack MD, saw and evaluated the patient  I have discussed the patient with the resident/non-physician practitioner and agree with the resident's/non-physician practitioner's findings, Plan of Care, and MDM as documented in the resident's/non-physician practitioner's note, except where noted  All available labs and Radiology studies were reviewed  At this point I agree with the current assessment done in the Emergency Department  I have conducted an independent evaluation of this patient including a focused history and a physical exam     68-year-old female, presenting to the emergency department for evaluation of bilateral hand pain and numbness  Patient states that the hand pain and numbness has been present in her bilateral hands, from the wrists through the finger tips of the thumb, index finger, middle finger, and ring finger intermittently for the past 3 months and continuously for the past 3 days  No trauma  No fever  On examination patient has good cap refill  Sensation intact  Decreased motor effort  Exam and findings consistent with carpal tunnel syndrome  No evidence of infection, ischemia, arthritis, trauma  Plan is print in anti-inflammatory medications

## 2019-01-03 ENCOUNTER — OFFICE VISIT (OUTPATIENT)
Dept: OBGYN CLINIC | Facility: CLINIC | Age: 29
End: 2019-01-03

## 2019-01-03 VITALS
BODY MASS INDEX: 23.66 KG/M2 | DIASTOLIC BLOOD PRESSURE: 87 MMHG | HEART RATE: 84 BPM | HEIGHT: 65 IN | WEIGHT: 142 LBS | SYSTOLIC BLOOD PRESSURE: 129 MMHG

## 2019-01-03 DIAGNOSIS — A59.01 TRICHOMONAL VAGINITIS: ICD-10-CM

## 2019-01-03 DIAGNOSIS — N89.8 VAGINAL DISCHARGE: Primary | ICD-10-CM

## 2019-01-03 DIAGNOSIS — Z20.2 SEXUALLY TRANSMITTED DISEASE EXPOSURE: ICD-10-CM

## 2019-01-03 PROCEDURE — 87591 N.GONORRHOEAE DNA AMP PROB: CPT | Performed by: OBSTETRICS & GYNECOLOGY

## 2019-01-03 PROCEDURE — 87491 CHLMYD TRACH DNA AMP PROBE: CPT | Performed by: OBSTETRICS & GYNECOLOGY

## 2019-01-03 PROCEDURE — 99214 OFFICE O/P EST MOD 30 MIN: CPT | Performed by: INTERNAL MEDICINE

## 2019-01-03 RX ORDER — METRONIDAZOLE 500 MG/1
2000 TABLET ORAL ONCE
Qty: 4 TABLET | Refills: 0 | Status: SHIPPED | OUTPATIENT
Start: 2019-01-03 | End: 2019-01-03

## 2019-01-03 NOTE — PROGRESS NOTES
[facility]  Harriet Frazier 33 y o  SUBJECTIVE    CC:  STD check up    HPI: Diana Marquez is a 33 y o   female presenting today for acute office visit  Patient was with one sexual partner for the past 6 months  She is not sexually active for the past one month  She has a complaint of vaginal discharge and itching for the past 1 month, thick light green color, watery with substantial odor like a "garbage can"  Patient never had any vaginal infection before  Patient says she was HPV positive in , no records found  LMP unknown exact date, most likely in august before she got depo shot  Birth control - patient is on depo shots for the past 6 months  The following portions of the patient's history were reviewed and updated as appropriate: allergies, past family history, past social history, past surgical history and problem list         ROS  General: negative for chills or fever   Respiratory: no cough, shortness of breath, or wheezing  Cardiovascular: no chest pain or dyspnea on exertion  Gastrointestinal: no abdominal pain, change in bowel habits, or black or bloody stools  Urinary: no urinary symptoms  Genitourinary: Negative and As Noted in HPI  Neurological: negative      OBJECTIVE  Vitals:    19 1324   BP: 129/87   BP Location: Left arm   Pulse: 84   Weight: 64 4 kg (142 lb)   Height: 5' 5" (1 651 m)       General appearance: alert and oriented, in no acute distress  Genitourinary exam: not done  normal external genitalia, vulva, vagina, cervix, uterus and adnexa  General Appearance: Alert, appropriate appearance for age  No acute distress  Normal, no discharge  - bloody discharge  not done    Vaginal discharge pH: not done  Wet mount findings: trichomoniasis: negative  Urine Pregnancy Test: none      Lab Results:   No visits with results within 1 Day(s) from this visit     Latest known visit with results is:   Admission on 2018, Discharged on 2018 Component Date Value    Sodium 11/25/2018 137     Potassium 11/25/2018 3 5     Chloride 11/25/2018 108     CO2 11/25/2018 21     ANION GAP 11/25/2018 8     BUN 11/25/2018 12     Creatinine 11/25/2018 1 02     Glucose 11/25/2018 75     Calcium 11/25/2018 9 6     AST 11/25/2018 16     ALT 11/25/2018 15     Alkaline Phosphatase 11/25/2018 64     Total Protein 11/25/2018 7 1     Albumin 11/25/2018 3 6     Total Bilirubin 11/25/2018 0 62     eGFR 11/25/2018 75     Magnesium 11/25/2018 2 0     Phosphorus 11/25/2018 2 0*    TSH 3RD GENERATON 11/25/2018 0 707     Free T4 11/25/2018 0 99               ASSESSMENT - Patient is 33years old female with 1 month lasting greenish vaginal discharge and itching  Patient with one sexual partner for the past 6 months  Currently not sexually active  Patient is not in contact with her previous sexual partner  PLAN    Possible exposure to STDs - ordered HIV, hepB surface antigen, RPR  - GC/chlamydia DNA by PCR    Vaginal trichomoniasis - wet mouth- confirmed presence of trichomonas - patient was prescribed 2g of metronidazole   - patient was counseled on importance of treatment of her partner, she is not in contact with her partner anymore  - PAP due 2/2019    Patient will schedule annual health exam for 3/2019  All questions were answered & patient expressed understanding  Patient was seen and discussed with Candelario Neely MD

## 2019-01-04 LAB
C TRACH DNA SPEC QL NAA+PROBE: NEGATIVE
N GONORRHOEA DNA SPEC QL NAA+PROBE: NEGATIVE

## 2019-02-02 ENCOUNTER — HOSPITAL ENCOUNTER (EMERGENCY)
Facility: HOSPITAL | Age: 29
Discharge: HOME | End: 2019-02-02
Attending: EMERGENCY MEDICINE
Payer: COMMERCIAL

## 2019-02-02 VITALS
SYSTOLIC BLOOD PRESSURE: 146 MMHG | RESPIRATION RATE: 18 BRPM | HEART RATE: 126 BPM | WEIGHT: 140 LBS | TEMPERATURE: 97.1 F | OXYGEN SATURATION: 97 % | DIASTOLIC BLOOD PRESSURE: 81 MMHG | BODY MASS INDEX: 23.3 KG/M2

## 2019-02-02 DIAGNOSIS — L02.91 ABSCESS: Primary | ICD-10-CM

## 2019-02-02 PROCEDURE — 99281 EMR DPT VST MAYX REQ PHY/QHP: CPT

## 2019-02-02 RX ORDER — SULFAMETHOXAZOLE AND TRIMETHOPRIM 800; 160 MG/1; MG/1
1 TABLET ORAL ONCE
Status: COMPLETED | OUTPATIENT
Start: 2019-02-02 | End: 2019-02-02

## 2019-02-02 RX ORDER — SULFAMETHOXAZOLE AND TRIMETHOPRIM 800; 160 MG/1; MG/1
1 TABLET ORAL 2 TIMES DAILY
Qty: 14 TABLET | Refills: 0 | Status: SHIPPED | OUTPATIENT
Start: 2019-02-02 | End: 2019-02-09

## 2019-02-02 RX ORDER — LIDOCAINE HYDROCHLORIDE AND EPINEPHRINE 10; 10 MG/ML; UG/ML
10 INJECTION, SOLUTION INFILTRATION; PERINEURAL ONCE
Status: COMPLETED | OUTPATIENT
Start: 2019-02-02 | End: 2019-02-02

## 2019-02-02 RX ADMIN — LIDOCAINE HYDROCHLORIDE,EPINEPHRINE BITARTRATE 10 ML: 10; .01 INJECTION, SOLUTION INFILTRATION; PERINEURAL at 02:28

## 2019-02-02 RX ADMIN — SULFAMETHOXAZOLE AND TRIMETHOPRIM 1 TABLET: 800; 160 TABLET ORAL at 03:06

## 2019-02-02 NOTE — DISCHARGE INSTRUCTIONS
Warm water baths/soaks across wound site  Abscess   WHAT YOU NEED TO KNOW:   A warm compress may help your abscess drain  Your healthcare provider may make a cut in the abscess so it can drain  You may need surgery to remove an abscess that is on your hands or buttocks  DISCHARGE INSTRUCTIONS:   Return to the emergency department if:   · The area around your abscess becomes very painful, warm, or has red streaks  · You have a fever and chills  · Your heart is beating faster than usual      · You feel faint or confused  Contact your healthcare provider if:   · Your abscess gets bigger or does not get better  · Your abscess returns  · You have questions or concerns about your condition or care  Medicines: You may  need any of the following:  · Antibiotics  help treat a bacterial infection  · Acetaminophen  decreases pain and fever  It is available without a doctor's order  Ask how much to take and how often to take it  Follow directions  Acetaminophen can cause liver damage if not taken correctly  · NSAIDs , such as ibuprofen, help decrease swelling, pain, and fever  This medicine is available with or without a doctor's order  NSAIDs can cause stomach bleeding or kidney problems in certain people  If you take blood thinner medicine, always ask your healthcare provider if NSAIDs are safe for you  Always read the medicine label and follow directions  · Take your medicine as directed  Contact your healthcare provider if you think your medicine is not helping or if you have side effects  Tell him or her if you are allergic to any medicine  Keep a list of the medicines, vitamins, and herbs you take  Include the amounts, and when and why you take them  Bring the list or the pill bottles to follow-up visits  Carry your medicine list with you in case of an emergency  Self-care:   · Apply a warm compress to your abscess  This will help it open and drain   Wet a washcloth in warm, but not hot, water  Apply the compress for 10 minutes  Repeat this 4 times each day  Do not  press on an abscess or try to open it with a needle  You may push the bacteria deeper or into your blood  · Do not share your clothes, towels, or sheets with anyone  This can spread the infection to others  · Wash your hands often  This can help prevent the spread of germs  Use soap and water or an alcohol-based hand rub  Care for your wound after it is drained:   · Care for your wound as directed  If your healthcare provider says it is okay, carefully remove the bandage and gauze packing  You may need to soak the gauze to get it out of your wound  Clean your wound and the area around it as directed  Dry the area and put on new, clean bandages  Change your bandages when they get wet or dirty  · Ask your healthcare provider how to change the gauze in your wound  Keep track of how many pieces of gauze are placed inside the wound  Do not put too much packing in the wound  Do not pack the gauze too tightly in your wound  Follow up with your healthcare provider in 1 to 3 days: You may need to have your packing removed or your bandage changed  Write down your questions so you remember to ask them during your visits  © 2017 [address] Information is for End User's use only and may not be sold, redistributed or otherwise used for commercial purposes  All illustrations and images included in CareNotes® are the copyrighted property of A D A M , Inc  or [employer]  The above information is an  only  It is not intended as medical advice for individual conditions or treatments  Talk to your doctor, nurse or pharmacist before following any medical regimen to see if it is safe and effective for you

## 2019-02-02 NOTE — ED ATTENDING ATTESTATION
Gladys Lauren MD, saw and evaluated the patient  I have discussed the patient with the resident/non-physician practitioner and agree with the resident's/non-physician practitioner's findings, Plan of Care, and MDM as documented in the resident's/non-physician practitioner's note, except where noted  All available labs and Radiology studies were reviewed  At this point I agree with the current assessment done in the Emergency Department  I have conducted an independent evaluation of this patient including a focused history of:    Emergency Department Note- Lissette Lazo 33 y o  female MRN: [id number]    Unit/Bed#: ED 02 Encounter: [id number]    Lissette Lazo is a 33 y o  female who presents with   Chief Complaint   Patient presents with   [address]     pt reports 3 days ago getting "bit"  Does not know what bit her but believes it was a spider  Presents today with large welt to forehead and open wound on forehead         History of Present Illness   HPI:  Lissette Lazo is a 33 y o  female who presents for evaluation of:  "spider bite" on Tuesday that was painful  She then developed a pustule and she tried to drain it on Wednesday  Patient states that she saw pus yesterday in the shower  Patient tried to treat it with peroxide cleaning and triple antibiotic ointment  Review of Systems   Constitutional: Negative for chills and fever  HENT: Negative for congestion and rhinorrhea  Skin: Positive for rash (forehead) and wound (forehead)  Neurological: Negative for light-headedness and headaches  All other systems reviewed and are negative        Historical Information   Past Medical History:   Diagnosis Date    Bacterial vaginosis     [facility]     [facility]aine abuse [facility]     Condyloma acuminata     Gastritis     GERD (gastroesophageal reflux disease)     Heroin abuse [facility]     [facility]arcotic abuse [facility]     Nephrolithiasis     Opioid withdrawal [facility]     Suicidal ideations  Vulvitis      Past Surgical History:   Procedure Laterality Date    APPENDECTOMY      HERNIA REPAIR      KNEE SURGERY      URETERAL STENT PLACEMENT       Social History   History   Alcohol Use No     Comment: rarely; Most recently a couple of shots and few beers     History   Drug Use No     Comment: States only active one is marijuana  Last use heroin / cocaine > 4 months ago     History   Smoking Status    Current Some Day Smoker    Packs/day: 0 30    Years: 10 00    Types: Cigarettes   Smokeless Tobacco    Never Used     Family History: non-contributory    Meds/Allergies   all medications and allergies reviewed  No Known Allergies    Objective   First Vitals:   Blood Pressure: 146/81 (02/02/19 0130)  Pulse: (!) 126 (02/02/19 0130)  Temperature: (!) 97 1 °F (36 2 °C) (02/02/19 0130)  Temp Source: Oral (02/02/19 0130)  Respirations: 18 (02/02/19 0130)  Weight - Scale: 63 5 kg (140 lb) (02/02/19 0130)  SpO2: 97 % (02/02/19 0130)    Current Vitals:   Blood Pressure: 146/81 (02/02/19 0130)  Pulse: (!) 126 (02/02/19 0130)  Temperature: (!) 97 1 °F (36 2 °C) (02/02/19 0130)  Temp Source: Oral (02/02/19 0130)  Respirations: 18 (02/02/19 0130)  Weight - Scale: 63 5 kg (140 lb) (02/02/19 0130)  SpO2: 97 % (02/02/19 0130)    No intake or output data in the 24 hours ending 02/02/19 0214    Invasive Devices          No matching active lines, drains, or airways          Physical Exam   Constitutional: She is oriented to person, place, and time  She appears well-developed and well-nourished  No distress  Eyes: Pupils are equal, round, and reactive to light  Conjunctivae are normal    Neck: Normal range of motion  Neck supple  Cardiovascular: Normal rate and regular rhythm  Pulmonary/Chest: Effort normal    Abdominal: Soft  Musculoskeletal: Normal range of motion  Neurological: She is alert and oriented to person, place, and time  Coordination normal    Skin: There is erythema (forehead)     Nursing note and vitals reviewed  Abscess forehead    Medical Decision Makin  Acute forehead abscess with adjacent STS: local anesthesia and I&D    No results found for this or any previous visit (from the past 36 hour(s))  No orders to display         Portions of the record may have been created with voice recognition software  Occasional wrong word or "sound a like" substitutions may have occurred due to the inherent limitations of voice recognition software  Read the chart carefully and recognize, using context, where substitutions have occurred

## 2019-02-02 NOTE — ED PROVIDER NOTES
History  Chief Complaint   Patient presents with   Avenkaren Diamond 82     pt reports 3 days ago getting "bit"  Does not know what bit her but believes it was a spider  Presents today with large welt to forehead and open wound on forehead     71-year-old female presents for evaluation of a bug bite  Patient states that on Tuesday she noticed a red dot in the middle of her forehead and but that she had been bitten by a spider  The next day she attempted a homeopathic remedy and soaked potato bread and milk and applied it to the red area with no improvement  Over the following 2 days the area continued to enlarge and then earlier this evening she noticed swelling diffusely over her forehead and decided to come in for further evaluation  Patient denies any fever or chills, headache, change in vision, nausea/vomiting, chest pain, shortness of breath, abdominal pain, constipation/diarrhea  Prior to Admission Medications   Prescriptions Last Dose Informant Patient Reported? Taking?    medroxyPROGESTERone (DEPO-PROVERA) 150 mg/mL injection   No Yes   Sig: Inject 1 mL (150 mg total) into a muscle every 3 (three) months      Facility-Administered Medications Last Administration Doses Remaining   medroxyPROGESTERone acetate (DEPO-PROVERA SYRINGE) IM injection 150 mg 11/21/2018  3:50 PM           Past Medical History:   Diagnosis Date    Bacterial vaginosis     [facility]     Cocaine abuse [facility]     Condyloma acuminata     Gastritis     GERD (gastroesophageal reflux disease)     [facility]in abuse [facility]     Narcotic abuse [facility]     Nephrolithiasis     Opioid withdrawal ([facility]     Suicidal ideations     Vulvitis        Past Surgical History:   Procedure Laterality Date    APPENDECTOMY      HERNIA REPAIR      KNEE SURGERY      URETERAL STENT PLACEMENT         Family History   Problem Relation Age of Onset    Diabetes Maternal Grandmother      I have reviewed and agree with the history as documented  Social History   Substance Use Topics    Smoking status: Current Some Day Smoker     Packs/day: 0 30     Years: 10 00     Types: Cigarettes    Smokeless tobacco: Never Used    Alcohol use No      Comment: rarely; Most recently a couple of shots and few beers        Review of Systems   Constitutional: Negative for chills, diaphoresis, fatigue and fever  Respiratory: Negative for cough and shortness of breath  Cardiovascular: Negative for chest pain and palpitations  Gastrointestinal: Negative for abdominal distention, abdominal pain, constipation, diarrhea, nausea and vomiting  Genitourinary: Negative for dysuria, frequency and hematuria  Musculoskeletal: Negative for arthralgias, myalgias and neck pain  Skin: Positive for wound ( Midline forehead, near hairline)  Neurological: Negative for dizziness, syncope, light-headedness and headaches  All other systems reviewed and are negative  Physical Exam  ED Triage Vitals [02/02/19 0130]   Temperature Pulse Respirations Blood Pressure SpO2   (!) 97 1 °F (36 2 °C) (!) 126 18 146/81 97 %      Temp Source Heart Rate Source Patient Position - Orthostatic VS BP Location FiO2 (%)   Oral Monitor -- Right arm --      Pain Score       --           Orthostatic Vital Signs  Vitals:    02/02/19 0130   BP: 146/81   Pulse: (!) 126       Physical Exam   Constitutional: She is oriented to person, place, and time  She appears well-nourished  No distress  HENT:   Head: Normocephalic and atraumatic  Head is without laceration  Hair is normal        Right Ear: External ear normal    Left Ear: External ear normal    Mouth/Throat: Oropharynx is clear and moist    Eyes: Pupils are equal, round, and reactive to light  Conjunctivae and EOM are normal  Right eye exhibits no discharge  Left eye exhibits no discharge  No scleral icterus  Neck: Normal range of motion  Neck supple  No JVD present     Cardiovascular: Normal rate, regular rhythm, normal heart sounds and intact distal pulses  Exam reveals no gallop and no friction rub  No murmur heard  Pulmonary/Chest: Effort normal and breath sounds normal  No respiratory distress  She has no wheezes  She has no rales  Abdominal: Soft  Bowel sounds are normal  She exhibits no distension and no mass  There is no tenderness  There is no guarding  Musculoskeletal: Normal range of motion  She exhibits no tenderness or deformity  Neurological: She is alert and oriented to person, place, and time  No cranial nerve deficit or sensory deficit  She exhibits normal muscle tone  Coordination normal    Skin: Skin is warm  She is not diaphoretic  Psychiatric: She has a normal mood and affect  Her behavior is normal  Judgment and thought content normal    Vitals reviewed        ED Medications  Medications   lidocaine-epinephrine (XYLOCAINE/EPINEPHRINE) 1 %-1:100,000 injection 10 mL (10 mL Infiltration Given 2/2/19 9979)   sulfamethoxazole-trimethoprim (BACTRIM DS) 800-160 mg per tablet 1 tablet (1 tablet Oral Given 2/2/19 0306)       Diagnostic Studies  Results Reviewed     None                 No orders to display         Procedures  Incision/Drainage  Date/Time: 2/2/2019 5:34 AM  Performed by: Carolann Lowe by: Stephanie Go     Patient location:  Bedside  Other Assisting Provider: Yes (comment)    Consent:     Consent obtained:  Verbal    Consent given by:  Patient    Risks discussed:  Bleeding, incomplete drainage, infection and pain    Alternatives discussed:  Observation, alternative treatment, no treatment and delayed treatment  Universal protocol:     Procedure explained and questions answered to patient or proxy's satisfaction: yes      Relevant documents present and verified: yes      Site/side marked: yes      Immediately prior to procedure a time out was called: yes      Patient identity confirmed:  Verbally with patient  Location:     Type:  Abscess    Location:  Head/neck    Head/neck location:  Face  Pre-procedure details:     Skin preparation:  Chloraprep  Anesthesia (see MAR for exact dosages): Anesthesia method:  Local infiltration    Local anesthetic:  Lidocaine 1% WITH epi  Procedure details:     Complexity:  Simple    Needle aspiration: no      Incision types:  Stab incision    Scalpel blade:  15    Approach:  Puncture    Incision depth:  Skin and subcutaneous    Wound management:  Probed and deloculated    Drainage:  Bloody and purulent    Drainage amount:  Scant    Wound treatment:  Wound left open    Packing materials:  None  Post-procedure details:     Patient tolerance of procedure: Tolerated well, no immediate complications          Phone Consults  ED Phone Contact    ED Course                               MDM  Number of Diagnoses or Management Options  Abscess:   Diagnosis management comments: Patient appears have a small abscess on her forehead near the hairline  Will drain after adequate anesthesia  After incision and de loculation a small amount of purulent discharge was removed  Patient was given a dose of Bactrim before leaving the emergency department and provided with a prescription for continued treatment  Patient was also advised to return to the emergency department on Sunday February 3rd for re-evaluation of the wound  Instructed to return if she started to have fevers, red streaking from the site, or increased/severe pain  Patient acknowledged understanding on this plan prior to departure  Disposition  Final diagnoses:   Abscess     Time reflects when diagnosis was documented in both MDM as applicable and the Disposition within this note     Time User Action Codes Description Comment    2/2/2019  2:57 AM Sukh Ordonez Add [L02 91] Abscess       ED Disposition     ED Disposition Condition Date/Time Comment    Discharge  Sat Feb 2, 2019  2:57 AM Martha Guido discharge to home/self care      Condition at discharge: Good        Follow-up Information Follow up With Specialties Details Why [address] [facility] Emergency Department Emergency Medicine In 1 day For wound re-check [address] [facility] ED, [address] [place] [address], [id number]          Discharge Medication List as of 2/2/2019  2:58 AM      START taking these medications    Details   sulfamethoxazole-trimethoprim (BACTRIM DS) 800-160 mg per tablet Take 1 tablet by mouth 2 (two) times a day for 7 days smx-tmp DS (BACTRIM) 800-160 mg tabs (1tab q12 D10), Starting Sat 2/2/2019, Until Sat 2/9/2019, Normal         CONTINUE these medications which have NOT CHANGED    Details   medroxyPROGESTERone (DEPO-PROVERA) 150 mg/mL injection Inject 1 mL (150 mg total) into a muscle every 3 (three) months, Starting Thu 8/30/2018, Normal           No discharge procedures on file  ED Provider  Attending physically available and evaluated [place]  I managed the patient along with the ED Attending      Electronically Signed by         Cecilia Ruff MD  02/02/19 8049

## 2019-02-18 ENCOUNTER — CLINICAL SUPPORT (OUTPATIENT)
Dept: OBGYN CLINIC | Facility: CLINIC | Age: 29
End: 2019-02-18

## 2019-02-18 DIAGNOSIS — Z30.42 ENCOUNTER FOR DEPO-PROVERA CONTRACEPTION: Primary | ICD-10-CM

## 2019-02-18 PROCEDURE — 96372 THER/PROPH/DIAG INJ SC/IM: CPT

## 2019-02-18 RX ADMIN — MEDROXYPROGESTERONE ACETATE 150 MG: 150 INJECTION, SUSPENSION INTRAMUSCULAR at 14:31

## 2019-02-18 NOTE — PROGRESS NOTES
Depo-Provera      [x]   Patient provided box     Syringe    Last  Annual/Pap Date: 08/30/2018  Elif Niraj Last Depo date: 11/21/2018   Side effects:    HCG; if applicable: n/a   Given by: Vanessa Stein RN      Site: Left buttock    Next appt  due: 05/06/2019  May 6-- 20th   Calcium supplement daily teaching, condoms for 2 weeks following first injection dose

## 2019-04-17 ENCOUNTER — APPOINTMENT (INPATIENT)
Dept: NON INVASIVE DIAGNOSTICS | Facility: HOSPITAL | Age: 29
DRG: 773 | End: 2019-04-17
Payer: COMMERCIAL

## 2019-04-17 ENCOUNTER — APPOINTMENT (EMERGENCY)
Dept: RADIOLOGY | Facility: HOSPITAL | Age: 29
DRG: 773 | End: 2019-04-17
Payer: COMMERCIAL

## 2019-04-17 ENCOUNTER — HOSPITAL ENCOUNTER (INPATIENT)
Facility: HOSPITAL | Age: 29
LOS: 2 days | Discharge: HOME | DRG: 773 | End: 2019-04-19
Attending: EMERGENCY MEDICINE | Admitting: INTERNAL MEDICINE
Payer: COMMERCIAL

## 2019-04-17 DIAGNOSIS — I49.8 BIGEMINY: Primary | ICD-10-CM

## 2019-04-17 DIAGNOSIS — F11.23 OPIATE WITHDRAWAL (HCC): ICD-10-CM

## 2019-04-17 DIAGNOSIS — Z30.42 ENCOUNTER FOR DEPO-PROVERA CONTRACEPTION: ICD-10-CM

## 2019-04-17 DIAGNOSIS — R79.89 LOW TSH LEVEL: ICD-10-CM

## 2019-04-17 PROBLEM — I49.9 CARDIAC ARRHYTHMIA: Status: ACTIVE | Noted: 2019-04-17

## 2019-04-17 PROBLEM — F11.93 OPIATE WITHDRAWAL (HCC): Status: ACTIVE | Noted: 2019-04-17

## 2019-04-17 LAB
ALBUMIN SERPL BCP-MCNC: 4.2 G/DL (ref 3.5–5)
ALP SERPL-CCNC: 69 U/L (ref 46–116)
ALT SERPL W P-5'-P-CCNC: 21 U/L (ref 12–78)
AMPHETAMINES SERPL QL SCN: NEGATIVE
ANION GAP SERPL CALCULATED.3IONS-SCNC: 6 MMOL/L (ref 4–13)
APAP SERPL-MCNC: <2 UG/ML (ref 10–20)
AST SERPL W P-5'-P-CCNC: 12 U/L (ref 5–45)
ATRIAL RATE: 74 BPM
BACTERIA UR QL AUTO: ABNORMAL /HPF
BARBITURATES UR QL: NEGATIVE
BASE EX.OXY STD BLDV CALC-SCNC: 66.1 % (ref 60–80)
BASE EXCESS BLDV CALC-SCNC: -2.7 MMOL/L
BASOPHILS # BLD AUTO: 0.02 THOUSANDS/ΜL (ref 0–0.1)
BASOPHILS NFR BLD AUTO: 0 % (ref 0–1)
BENZODIAZ UR QL: NEGATIVE
BILIRUB SERPL-MCNC: 0.82 MG/DL (ref 0.2–1)
BILIRUB UR QL STRIP: NEGATIVE
BUN SERPL-MCNC: 13 MG/DL (ref 5–25)
CALCIUM SERPL-MCNC: 9.2 MG/DL (ref 8.3–10.1)
CHLORIDE SERPL-SCNC: 108 MMOL/L (ref 100–108)
CK SERPL-CCNC: 83 U/L (ref 26–192)
CLARITY UR: CLEAR
CO2 SERPL-SCNC: 25 MMOL/L (ref 21–32)
COCAINE UR QL: NEGATIVE
COLOR UR: YELLOW
COLOR, POC: NORMAL
CREAT SERPL-MCNC: 0.93 MG/DL (ref 0.6–1.3)
EOSINOPHIL # BLD AUTO: 0 THOUSAND/ΜL (ref 0–0.61)
EOSINOPHIL NFR BLD AUTO: 0 % (ref 0–6)
ERYTHROCYTE [DISTWIDTH] IN BLOOD BY AUTOMATED COUNT: 12.7 % (ref 11.6–15.1)
ETHANOL EXG-MCNC: NORMAL MG/DL
ETHANOL SERPL-MCNC: <3 MG/DL (ref 0–3)
EXT PREG TEST URINE: NORMAL
GFR SERPL CREATININE-BSD FRML MDRD: 83 ML/MIN/1.73SQ M
GLUCOSE SERPL-MCNC: 126 MG/DL (ref 65–140)
GLUCOSE UR STRIP-MCNC: NEGATIVE MG/DL
HCO3 BLDV-SCNC: 21.9 MMOL/L (ref 24–30)
HCT VFR BLD AUTO: 43 % (ref 34.8–46.1)
HGB BLD-MCNC: 14.9 G/DL (ref 11.5–15.4)
HGB UR QL STRIP.AUTO: ABNORMAL
HYALINE CASTS #/AREA URNS LPF: ABNORMAL /LPF
IMM GRANULOCYTES # BLD AUTO: 0.07 THOUSAND/UL (ref 0–0.2)
IMM GRANULOCYTES NFR BLD AUTO: 1 % (ref 0–2)
KETONES UR STRIP-MCNC: ABNORMAL MG/DL
LEUKOCYTE ESTERASE UR QL STRIP: NEGATIVE
LYMPHOCYTES # BLD AUTO: 1.62 THOUSANDS/ΜL (ref 0.6–4.47)
LYMPHOCYTES NFR BLD AUTO: 12 % (ref 14–44)
MAGNESIUM SERPL-MCNC: 2.5 MG/DL (ref 1.6–2.6)
MCH RBC QN AUTO: 32.6 PG (ref 26.8–34.3)
MCHC RBC AUTO-ENTMCNC: 34.7 G/DL (ref 31.4–37.4)
MCV RBC AUTO: 94 FL (ref 82–98)
METHADONE UR QL: NEGATIVE
MONOCYTES # BLD AUTO: 0.26 THOUSAND/ΜL (ref 0.17–1.22)
MONOCYTES NFR BLD AUTO: 2 % (ref 4–12)
NEUTROPHILS # BLD AUTO: 12.11 THOUSANDS/ΜL (ref 1.85–7.62)
NEUTS SEG NFR BLD AUTO: 85 % (ref 43–75)
NITRITE UR QL STRIP: NEGATIVE
NON-SQ EPI CELLS URNS QL MICRO: ABNORMAL /HPF
NRBC BLD AUTO-RTO: 0 /100 WBCS
O2 CT BLDV-SCNC: 13.6 ML/DL
OPIATES UR QL SCN: NEGATIVE
P AXIS: 68 DEGREES
PCO2 BLDV: 37.8 MM HG (ref 42–50)
PCP UR QL: NEGATIVE
PH BLDV: 7.38 [PH] (ref 7.3–7.4)
PH UR STRIP.AUTO: 8.5 [PH] (ref 4.5–8)
PHOSPHATE SERPL-MCNC: 2 MG/DL (ref 2.7–4.5)
PLATELET # BLD AUTO: 213 THOUSANDS/UL (ref 149–390)
PMV BLD AUTO: 12.2 FL (ref 8.9–12.7)
PO2 BLDV: 37.3 MM HG (ref 35–45)
POTASSIUM SERPL-SCNC: 3.7 MMOL/L (ref 3.5–5.3)
PR INTERVAL: 94 MS
PROT SERPL-MCNC: 7.4 G/DL (ref 6.4–8.2)
PROT UR STRIP-MCNC: ABNORMAL MG/DL
QRS AXIS: 49 DEGREES
QRSD INTERVAL: 102 MS
QT INTERVAL: 410 MS
QTC INTERVAL: 445 MS
RBC # BLD AUTO: 4.57 MILLION/UL (ref 3.81–5.12)
RBC #/AREA URNS AUTO: ABNORMAL /HPF
SALICYLATES SERPL-MCNC: <3 MG/DL (ref 3–20)
SODIUM SERPL-SCNC: 139 MMOL/L (ref 136–145)
SP GR UR STRIP.AUTO: 1.02 (ref 1–1.03)
T WAVE AXIS: 57 DEGREES
T3FREE SERPL-MCNC: 2.05 PG/ML (ref 2.3–4.2)
T4 FREE SERPL-MCNC: 1.4 NG/DL (ref 0.76–1.46)
THC UR QL: POSITIVE
TROPONIN I SERPL-MCNC: <0.02 NG/ML
TSH SERPL DL<=0.05 MIU/L-ACNC: 0.09 UIU/ML (ref 0.36–3.74)
UROBILINOGEN UR QL STRIP.AUTO: 0.2 E.U./DL
VENTRICULAR RATE: 71 BPM
WBC # BLD AUTO: 14.08 THOUSAND/UL (ref 4.31–10.16)
WBC #/AREA URNS AUTO: ABNORMAL /HPF

## 2019-04-17 PROCEDURE — 81001 URINALYSIS AUTO W/SCOPE: CPT

## 2019-04-17 PROCEDURE — 93306 TTE W/DOPPLER COMPLETE: CPT | Performed by: INTERNAL MEDICINE

## 2019-04-17 PROCEDURE — 83735 ASSAY OF MAGNESIUM: CPT | Performed by: EMERGENCY MEDICINE

## 2019-04-17 PROCEDURE — 80329 ANALGESICS NON-OPIOID 1 OR 2: CPT | Performed by: EMERGENCY MEDICINE

## 2019-04-17 PROCEDURE — 84439 ASSAY OF FREE THYROXINE: CPT | Performed by: INTERNAL MEDICINE

## 2019-04-17 PROCEDURE — 71046 X-RAY EXAM CHEST 2 VIEWS: CPT

## 2019-04-17 PROCEDURE — 84484 ASSAY OF TROPONIN QUANT: CPT | Performed by: EMERGENCY MEDICINE

## 2019-04-17 PROCEDURE — 93005 ELECTROCARDIOGRAM TRACING: CPT

## 2019-04-17 PROCEDURE — 99449 NTRPROF PH1/NTRNET/EHR 31/>: CPT | Performed by: EMERGENCY MEDICINE

## 2019-04-17 PROCEDURE — 99285 EMERGENCY DEPT VISIT HI MDM: CPT

## 2019-04-17 PROCEDURE — 84484 ASSAY OF TROPONIN QUANT: CPT | Performed by: INTERNAL MEDICINE

## 2019-04-17 PROCEDURE — 84100 ASSAY OF PHOSPHORUS: CPT | Performed by: EMERGENCY MEDICINE

## 2019-04-17 PROCEDURE — 99285 EMERGENCY DEPT VISIT HI MDM: CPT | Performed by: EMERGENCY MEDICINE

## 2019-04-17 PROCEDURE — 80307 DRUG TEST PRSMV CHEM ANLYZR: CPT | Performed by: EMERGENCY MEDICINE

## 2019-04-17 PROCEDURE — 82075 ASSAY OF BREATH ETHANOL: CPT | Performed by: EMERGENCY MEDICINE

## 2019-04-17 PROCEDURE — 93306 TTE W/DOPPLER COMPLETE: CPT

## 2019-04-17 PROCEDURE — 36415 COLL VENOUS BLD VENIPUNCTURE: CPT | Performed by: EMERGENCY MEDICINE

## 2019-04-17 PROCEDURE — 85025 COMPLETE CBC W/AUTO DIFF WBC: CPT | Performed by: EMERGENCY MEDICINE

## 2019-04-17 PROCEDURE — 80320 DRUG SCREEN QUANTALCOHOLS: CPT | Performed by: EMERGENCY MEDICINE

## 2019-04-17 PROCEDURE — 96361 HYDRATE IV INFUSION ADD-ON: CPT

## 2019-04-17 PROCEDURE — 82805 BLOOD GASES W/O2 SATURATION: CPT | Performed by: EMERGENCY MEDICINE

## 2019-04-17 PROCEDURE — 93010 ELECTROCARDIOGRAM REPORT: CPT | Performed by: INTERNAL MEDICINE

## 2019-04-17 PROCEDURE — 82550 ASSAY OF CK (CPK): CPT | Performed by: EMERGENCY MEDICINE

## 2019-04-17 PROCEDURE — 84481 FREE ASSAY (FT-3): CPT | Performed by: INTERNAL MEDICINE

## 2019-04-17 PROCEDURE — 99223 1ST HOSP IP/OBS HIGH 75: CPT | Performed by: INTERNAL MEDICINE

## 2019-04-17 PROCEDURE — 70450 CT HEAD/BRAIN W/O DYE: CPT

## 2019-04-17 PROCEDURE — 96374 THER/PROPH/DIAG INJ IV PUSH: CPT

## 2019-04-17 PROCEDURE — 94762 N-INVAS EAR/PLS OXIMTRY CONT: CPT

## 2019-04-17 PROCEDURE — 84443 ASSAY THYROID STIM HORMONE: CPT | Performed by: EMERGENCY MEDICINE

## 2019-04-17 PROCEDURE — 80053 COMPREHEN METABOLIC PANEL: CPT | Performed by: EMERGENCY MEDICINE

## 2019-04-17 PROCEDURE — 81025 URINE PREGNANCY TEST: CPT | Performed by: EMERGENCY MEDICINE

## 2019-04-17 RX ORDER — MAGNESIUM HYDROXIDE/ALUMINUM HYDROXICE/SIMETHICONE 120; 1200; 1200 MG/30ML; MG/30ML; MG/30ML
30 SUSPENSION ORAL EVERY 6 HOURS PRN
Status: DISCONTINUED | OUTPATIENT
Start: 2019-04-17 | End: 2019-04-20 | Stop reason: HOSPADM

## 2019-04-17 RX ORDER — ONDANSETRON 2 MG/ML
4 INJECTION INTRAMUSCULAR; INTRAVENOUS ONCE
Status: COMPLETED | OUTPATIENT
Start: 2019-04-17 | End: 2019-04-17

## 2019-04-17 RX ORDER — LORAZEPAM 2 MG/ML
0.5 INJECTION INTRAMUSCULAR EVERY 6 HOURS PRN
Status: DISCONTINUED | OUTPATIENT
Start: 2019-04-17 | End: 2019-04-18

## 2019-04-17 RX ORDER — NICOTINE 21 MG/24HR
1 PATCH, TRANSDERMAL 24 HOURS TRANSDERMAL DAILY
Status: DISCONTINUED | OUTPATIENT
Start: 2019-04-18 | End: 2019-04-20 | Stop reason: HOSPADM

## 2019-04-17 RX ORDER — DOCUSATE SODIUM 100 MG/1
100 CAPSULE, LIQUID FILLED ORAL 2 TIMES DAILY
Status: DISCONTINUED | OUTPATIENT
Start: 2019-04-17 | End: 2019-04-20 | Stop reason: HOSPADM

## 2019-04-17 RX ORDER — POLYETHYLENE GLYCOL 3350 17 G/17G
17 POWDER, FOR SOLUTION ORAL DAILY
Status: DISCONTINUED | OUTPATIENT
Start: 2019-04-18 | End: 2019-04-20 | Stop reason: HOSPADM

## 2019-04-17 RX ORDER — SODIUM CHLORIDE 9 MG/ML
100 INJECTION, SOLUTION INTRAVENOUS CONTINUOUS
Status: DISCONTINUED | OUTPATIENT
Start: 2019-04-17 | End: 2019-04-18

## 2019-04-17 RX ORDER — ACETAMINOPHEN 325 MG/1
650 TABLET ORAL EVERY 6 HOURS PRN
Status: DISCONTINUED | OUTPATIENT
Start: 2019-04-17 | End: 2019-04-20 | Stop reason: HOSPADM

## 2019-04-17 RX ORDER — LORAZEPAM 0.5 MG/1
0.5 TABLET ORAL ONCE
Status: COMPLETED | OUTPATIENT
Start: 2019-04-17 | End: 2019-04-17

## 2019-04-17 RX ADMIN — SODIUM CHLORIDE 1000 ML: 0.9 INJECTION, SOLUTION INTRAVENOUS at 09:18

## 2019-04-17 RX ADMIN — SODIUM CHLORIDE 100 ML/HR: 0.9 INJECTION, SOLUTION INTRAVENOUS at 16:48

## 2019-04-17 RX ADMIN — ONDANSETRON 4 MG: 2 INJECTION INTRAMUSCULAR; INTRAVENOUS at 09:19

## 2019-04-17 RX ADMIN — LORAZEPAM 0.5 MG: 0.5 TABLET ORAL at 09:41

## 2019-04-17 RX ADMIN — SODIUM CHLORIDE 1000 ML: 0.9 INJECTION, SOLUTION INTRAVENOUS at 12:15

## 2019-04-18 PROBLEM — F19.10 POLYSUBSTANCE ABUSE (HCC): Status: ACTIVE | Noted: 2019-04-18

## 2019-04-18 LAB
ALBUMIN SERPL BCP-MCNC: 3.5 G/DL (ref 3.5–5)
ALP SERPL-CCNC: 56 U/L (ref 46–116)
ALT SERPL W P-5'-P-CCNC: 17 U/L (ref 12–78)
ANION GAP SERPL CALCULATED.3IONS-SCNC: 5 MMOL/L (ref 4–13)
AST SERPL W P-5'-P-CCNC: 11 U/L (ref 5–45)
ATRIAL RATE: 80 BPM
ATRIAL RATE: 82 BPM
BASOPHILS # BLD AUTO: 0.04 THOUSANDS/ΜL (ref 0–0.1)
BASOPHILS NFR BLD AUTO: 0 % (ref 0–1)
BILIRUB SERPL-MCNC: 0.53 MG/DL (ref 0.2–1)
BUN SERPL-MCNC: 6 MG/DL (ref 5–25)
CALCIUM SERPL-MCNC: 8.1 MG/DL (ref 8.3–10.1)
CHLORIDE SERPL-SCNC: 110 MMOL/L (ref 100–108)
CO2 SERPL-SCNC: 24 MMOL/L (ref 21–32)
CORTIS SERPL-MCNC: 34.3 UG/DL
CREAT SERPL-MCNC: 0.77 MG/DL (ref 0.6–1.3)
EOSINOPHIL # BLD AUTO: 0.04 THOUSAND/ΜL (ref 0–0.61)
EOSINOPHIL NFR BLD AUTO: 0 % (ref 0–6)
ERYTHROCYTE [DISTWIDTH] IN BLOOD BY AUTOMATED COUNT: 13 % (ref 11.6–15.1)
GFR SERPL CREATININE-BSD FRML MDRD: 105 ML/MIN/1.73SQ M
GLUCOSE SERPL-MCNC: 102 MG/DL (ref 65–140)
HCT VFR BLD AUTO: 38.6 % (ref 34.8–46.1)
HGB BLD-MCNC: 12.9 G/DL (ref 11.5–15.4)
IMM GRANULOCYTES # BLD AUTO: 0.06 THOUSAND/UL (ref 0–0.2)
IMM GRANULOCYTES NFR BLD AUTO: 1 % (ref 0–2)
LYMPHOCYTES # BLD AUTO: 2.87 THOUSANDS/ΜL (ref 0.6–4.47)
LYMPHOCYTES NFR BLD AUTO: 23 % (ref 14–44)
MAGNESIUM SERPL-MCNC: 2 MG/DL (ref 1.6–2.6)
MCH RBC QN AUTO: 32.2 PG (ref 26.8–34.3)
MCHC RBC AUTO-ENTMCNC: 33.4 G/DL (ref 31.4–37.4)
MCV RBC AUTO: 96 FL (ref 82–98)
MONOCYTES # BLD AUTO: 0.48 THOUSAND/ΜL (ref 0.17–1.22)
MONOCYTES NFR BLD AUTO: 4 % (ref 4–12)
NEUTROPHILS # BLD AUTO: 9.23 THOUSANDS/ΜL (ref 1.85–7.62)
NEUTS SEG NFR BLD AUTO: 72 % (ref 43–75)
NRBC BLD AUTO-RTO: 0 /100 WBCS
PLATELET # BLD AUTO: 193 THOUSANDS/UL (ref 149–390)
PMV BLD AUTO: 12.4 FL (ref 8.9–12.7)
POTASSIUM SERPL-SCNC: 3.4 MMOL/L (ref 3.5–5.3)
PROT SERPL-MCNC: 6.1 G/DL (ref 6.4–8.2)
QRS AXIS: 67 DEGREES
QRS AXIS: 74 DEGREES
QRSD INTERVAL: 100 MS
QRSD INTERVAL: 90 MS
QT INTERVAL: 384 MS
QT INTERVAL: 414 MS
QTC INTERVAL: 448 MS
QTC INTERVAL: 465 MS
RBC # BLD AUTO: 4.01 MILLION/UL (ref 3.81–5.12)
SODIUM SERPL-SCNC: 139 MMOL/L (ref 136–145)
T WAVE AXIS: 40 DEGREES
T WAVE AXIS: 40 DEGREES
VENTRICULAR RATE: 76 BPM
VENTRICULAR RATE: 82 BPM
WBC # BLD AUTO: 12.72 THOUSAND/UL (ref 4.31–10.16)

## 2019-04-18 PROCEDURE — 85025 COMPLETE CBC W/AUTO DIFF WBC: CPT | Performed by: INTERNAL MEDICINE

## 2019-04-18 PROCEDURE — 83735 ASSAY OF MAGNESIUM: CPT | Performed by: INTERNAL MEDICINE

## 2019-04-18 PROCEDURE — 80053 COMPREHEN METABOLIC PANEL: CPT | Performed by: INTERNAL MEDICINE

## 2019-04-18 PROCEDURE — 93010 ELECTROCARDIOGRAM REPORT: CPT | Performed by: INTERNAL MEDICINE

## 2019-04-18 PROCEDURE — 99232 SBSQ HOSP IP/OBS MODERATE 35: CPT | Performed by: INTERNAL MEDICINE

## 2019-04-18 PROCEDURE — 99254 IP/OBS CNSLTJ NEW/EST MOD 60: CPT | Performed by: INTERNAL MEDICINE

## 2019-04-18 PROCEDURE — 82533 TOTAL CORTISOL: CPT | Performed by: INTERNAL MEDICINE

## 2019-04-18 RX ORDER — ONDANSETRON 2 MG/ML
4 INJECTION INTRAMUSCULAR; INTRAVENOUS EVERY 6 HOURS PRN
Status: DISCONTINUED | OUTPATIENT
Start: 2019-04-18 | End: 2019-04-20 | Stop reason: HOSPADM

## 2019-04-18 RX ORDER — LORAZEPAM 0.5 MG/1
0.5 TABLET ORAL EVERY 6 HOURS PRN
Status: DISCONTINUED | OUTPATIENT
Start: 2019-04-18 | End: 2019-04-20 | Stop reason: HOSPADM

## 2019-04-18 RX ORDER — POTASSIUM CHLORIDE 20 MEQ/1
40 TABLET, EXTENDED RELEASE ORAL ONCE
Status: COMPLETED | OUTPATIENT
Start: 2019-04-18 | End: 2019-04-18

## 2019-04-18 RX ORDER — ONDANSETRON 2 MG/ML
4 INJECTION INTRAMUSCULAR; INTRAVENOUS ONCE AS NEEDED
Status: COMPLETED | OUTPATIENT
Start: 2019-04-18 | End: 2019-04-18

## 2019-04-18 RX ADMIN — LORAZEPAM 0.5 MG: 2 INJECTION INTRAMUSCULAR; INTRAVENOUS at 09:25

## 2019-04-18 RX ADMIN — ONDANSETRON 4 MG: 2 INJECTION INTRAMUSCULAR; INTRAVENOUS at 05:28

## 2019-04-18 RX ADMIN — POTASSIUM CHLORIDE 40 MEQ: 1500 TABLET, EXTENDED RELEASE ORAL at 11:53

## 2019-04-18 RX ADMIN — LORAZEPAM 0.5 MG: 0.5 TABLET ORAL at 16:05

## 2019-04-18 RX ADMIN — SODIUM CHLORIDE 100 ML/HR: 0.9 INJECTION, SOLUTION INTRAVENOUS at 03:05

## 2019-04-18 RX ADMIN — SODIUM CHLORIDE 100 ML/HR: 0.9 INJECTION, SOLUTION INTRAVENOUS at 13:10

## 2019-04-18 RX ADMIN — ONDANSETRON 4 MG: 2 INJECTION INTRAMUSCULAR; INTRAVENOUS at 20:31

## 2019-04-19 VITALS
SYSTOLIC BLOOD PRESSURE: 118 MMHG | BODY MASS INDEX: 24.83 KG/M2 | OXYGEN SATURATION: 100 % | HEIGHT: 65 IN | DIASTOLIC BLOOD PRESSURE: 77 MMHG | HEART RATE: 80 BPM | RESPIRATION RATE: 16 BRPM | WEIGHT: 149 LBS | TEMPERATURE: 99.7 F

## 2019-04-19 LAB
ANION GAP SERPL CALCULATED.3IONS-SCNC: 8 MMOL/L (ref 4–13)
BUN SERPL-MCNC: 7 MG/DL (ref 5–25)
CALCIUM SERPL-MCNC: 8.8 MG/DL (ref 8.3–10.1)
CHLORIDE SERPL-SCNC: 106 MMOL/L (ref 100–108)
CO2 SERPL-SCNC: 26 MMOL/L (ref 21–32)
CREAT SERPL-MCNC: 0.74 MG/DL (ref 0.6–1.3)
GFR SERPL CREATININE-BSD FRML MDRD: 110 ML/MIN/1.73SQ M
GLUCOSE SERPL-MCNC: 101 MG/DL (ref 65–140)
MAGNESIUM SERPL-MCNC: 2 MG/DL (ref 1.6–2.6)
POTASSIUM SERPL-SCNC: 3.3 MMOL/L (ref 3.5–5.3)
SODIUM SERPL-SCNC: 140 MMOL/L (ref 136–145)

## 2019-04-19 PROCEDURE — 80048 BASIC METABOLIC PNL TOTAL CA: CPT | Performed by: INTERNAL MEDICINE

## 2019-04-19 PROCEDURE — 99239 HOSP IP/OBS DSCHRG MGMT >30: CPT | Performed by: INTERNAL MEDICINE

## 2019-04-19 PROCEDURE — 83735 ASSAY OF MAGNESIUM: CPT | Performed by: INTERNAL MEDICINE

## 2019-04-19 RX ORDER — POTASSIUM CHLORIDE 20 MEQ/1
40 TABLET, EXTENDED RELEASE ORAL ONCE
Status: COMPLETED | OUTPATIENT
Start: 2019-04-19 | End: 2019-04-19

## 2019-04-19 RX ORDER — DICYCLOMINE HYDROCHLORIDE 10 MG/1
10 CAPSULE ORAL 3 TIMES DAILY PRN
Status: DISCONTINUED | OUTPATIENT
Start: 2019-04-19 | End: 2019-04-20 | Stop reason: HOSPADM

## 2019-04-19 RX ADMIN — LORAZEPAM 0.5 MG: 0.5 TABLET ORAL at 06:09

## 2019-04-19 RX ADMIN — ONDANSETRON 4 MG: 2 INJECTION INTRAMUSCULAR; INTRAVENOUS at 02:22

## 2019-04-19 RX ADMIN — LORAZEPAM 0.5 MG: 0.5 TABLET ORAL at 12:09

## 2019-04-19 RX ADMIN — POTASSIUM CHLORIDE 40 MEQ: 1500 TABLET, EXTENDED RELEASE ORAL at 11:28

## 2019-04-19 RX ADMIN — LORAZEPAM 0.5 MG: 0.5 TABLET ORAL at 00:20

## 2019-04-19 RX ADMIN — ALUMINUM HYDROXIDE, MAGNESIUM HYDROXIDE, AND SIMETHICONE 30 ML: 200; 200; 20 SUSPENSION ORAL at 06:09

## 2019-04-19 RX ADMIN — ONDANSETRON 4 MG: 2 INJECTION INTRAMUSCULAR; INTRAVENOUS at 16:12

## 2019-04-19 RX ADMIN — NICOTINE 1 PATCH: 14 PATCH TRANSDERMAL at 16:16

## 2019-05-06 ENCOUNTER — CLINICAL SUPPORT (OUTPATIENT)
Dept: OBGYN CLINIC | Facility: CLINIC | Age: 29
End: 2019-05-06

## 2019-05-06 DIAGNOSIS — Z30.42 ENCOUNTER FOR DEPO-PROVERA CONTRACEPTION: Primary | ICD-10-CM

## 2019-05-06 PROCEDURE — 96372 THER/PROPH/DIAG INJ SC/IM: CPT

## 2019-05-06 RX ORDER — MEDROXYPROGESTERONE ACETATE 150 MG/ML
150 INJECTION, SUSPENSION INTRAMUSCULAR
Status: SHIPPED | OUTPATIENT
Start: 2019-05-06

## 2019-05-06 RX ADMIN — MEDROXYPROGESTERONE ACETATE 150 MG: 150 INJECTION, SUSPENSION INTRAMUSCULAR at 16:10

## 2019-11-11 ENCOUNTER — OFFICE VISIT (OUTPATIENT)
Dept: OBGYN CLINIC | Facility: CLINIC | Age: 29
End: 2019-11-11

## 2019-11-11 VITALS
HEART RATE: 108 BPM | DIASTOLIC BLOOD PRESSURE: 74 MMHG | BODY MASS INDEX: 23.16 KG/M2 | HEIGHT: 65 IN | SYSTOLIC BLOOD PRESSURE: 119 MMHG | WEIGHT: 139 LBS

## 2019-11-11 DIAGNOSIS — N76.0 BV (BACTERIAL VAGINOSIS): ICD-10-CM

## 2019-11-11 DIAGNOSIS — N89.8 VAGINAL DISCHARGE: ICD-10-CM

## 2019-11-11 DIAGNOSIS — Z20.2 STD EXPOSURE: Primary | ICD-10-CM

## 2019-11-11 DIAGNOSIS — Z72.51 HIGH RISK HETEROSEXUAL BEHAVIOR: ICD-10-CM

## 2019-11-11 DIAGNOSIS — B96.89 BV (BACTERIAL VAGINOSIS): ICD-10-CM

## 2019-11-11 LAB — SL AMB POCT URINE HCG: NEGATIVE

## 2019-11-11 PROCEDURE — 87491 CHLMYD TRACH DNA AMP PROBE: CPT | Performed by: NURSE PRACTITIONER

## 2019-11-11 PROCEDURE — 87210 SMEAR WET MOUNT SALINE/INK: CPT | Performed by: NURSE PRACTITIONER

## 2019-11-11 PROCEDURE — 99213 OFFICE O/P EST LOW 20 MIN: CPT | Performed by: NURSE PRACTITIONER

## 2019-11-11 PROCEDURE — 87591 N.GONORRHOEAE DNA AMP PROB: CPT | Performed by: NURSE PRACTITIONER

## 2019-11-11 PROCEDURE — 81025 URINE PREGNANCY TEST: CPT | Performed by: NURSE PRACTITIONER

## 2019-11-11 RX ORDER — METRONIDAZOLE 500 MG/1
500 TABLET ORAL EVERY 12 HOURS SCHEDULED
Qty: 14 TABLET | Refills: 0 | Status: SHIPPED | OUTPATIENT
Start: 2019-11-11 | End: 2019-11-18

## 2019-11-11 NOTE — PROGRESS NOTES
Assessment/Plan:      Diagnoses and all orders for this visit:    STD exposure  -     POCT urine HCG  -     POCT wet mount  -     Chlamydia/GC amplified DNA by PCR  -     Hepatitis B surface antigen; Future  -     HIV 1/2 AG-AB combo; Future  -     RPR; Future    High risk heterosexual behavior  -     POCT wet mount  -     Chlamydia/GC amplified DNA by PCR  -     Hepatitis B surface antigen; Future  -     HIV 1/2 AG-AB combo; Future  -     RPR; Future    Vaginal discharge    BV (bacterial vaginosis)  -     metroNIDAZOLE (FLAGYL) 500 mg tablet; Take 1 tablet (500 mg total) by mouth every 12 (twelve) hours for 7 days      -reviewed diagnosis of bacterial vaginosis and treatment with metronidazole  Patient encouraged to avoid alcohol use while taking medication  Written information provided  -hygiene reviewed including avoid scented soaps lotions and lubricants, avoid feminine wash products, avoid tight/restrictive clothing, partner should use gentle soap on genitals  -will call with results  -encouraged safe sexual practices including consistent condom use  Patient verbalizes understanding of options for birth control  RTO for annual exam with Pap smear    Subjective:     Patient ID: Rand Perez is a 26 y o  female  HPI  here with complaints of vaginal discharge for the past 2 weeks  Discharge is described as yellow/white and thin  Associated symptom foul odor  Denies external or internal itching  Has had similar symptoms in the past and been treated for infections  Desires STI testing  Is currently sexually active  Is not using any consistent form of birth control  Denies fever, chills, nausea, vomiting, pelvic pain, bowel or bladder concerns  Last Pap smear 16-resulted NILM    Review of Systems   Constitutional: Negative for chills and fever  Respiratory: Negative  Genitourinary: Positive for vaginal discharge   Negative for dysuria, frequency, hematuria, urgency, vaginal bleeding and vaginal pain  Objective:     Physical Exam   Constitutional: She is oriented to person, place, and time  She appears well-developed and well-nourished  Genitourinary: There is no rash, tenderness, lesion or injury on the right labia  There is no rash, tenderness, lesion or injury on the left labia  Uterus is not deviated, not enlarged, not fixed and not tender  Cervix exhibits no motion tenderness, no discharge and no friability  Right adnexum displays no mass, no tenderness and no fullness  Left adnexum displays no mass, no tenderness and no fullness  No erythema, tenderness or bleeding in the vagina  No foreign body in the vagina  No signs of injury around the vagina  Vaginal discharge found  Genitourinary Comments: Large amount of thin white vaginal discharge noted on speculum exam   Neurological: She is alert and oriented to person, place, and time  Psychiatric: She has a normal mood and affect   Her behavior is normal  Thought content normal

## 2019-11-11 NOTE — PATIENT INSTRUCTIONS
Metronidazole (By mouth)   Metronidazole (met-theresa-MICHAEL-da-zole)  Treats bacterial infections  Brand Name(s): Flagyl, Flagyl 375   There may be other brand names for this medicine  When This Medicine Should Not Be Used: This medicine is not right for everyone  Do not use if you had an allergic reaction to metronidazole or similar medicines  Do not use this medicine to treat trichomoniasis if you are in the first 3 months of pregnancy  How to Use This Medicine:   Capsule, Tablet, Long Acting Tablet  · Take this medicine as directed, and take it at the same time each day  · Capsule or Tablet: Take with food or milk to avoid stomach upset  · Extended-release tablet: Take it on an empty stomach, 1 hour before or 2 hours after a meal   · Swallow the extended-release tablet whole  Do not crush, break, or chew it  · Take all of the medicine in your prescription to clear up your infection, even if you feel better after the first few doses  · Missed dose: Take a dose as soon as you remember  If it is almost time for your next dose, wait until then and take a regular dose  Do not take extra medicine to make up for a missed dose  · Store the medicine in a closed container at room temperature, away from heat, moisture, and direct light  Drugs and Foods to Avoid:   Ask your doctor or pharmacist before using any other medicine, including over-the-counter medicines, vitamins, and herbal products  · Do not use this medicine if you have taken disulfiram within the last 2 weeks  Do not drink alcohol or use medicine that contains alcohol or propylene glycol while using this medicine and for at least 3 days after you have finished metronidazole treatment  · Some foods and medicines can affect how metronidazole works  Tell your doctor if you are using busulfan, cimetidine, lithium, phenobarbital, phenytoin, or warfarin or another blood thinner    Warnings While Using This Medicine:   · Tell your doctor if you are pregnant or breastfeeding, or if you have kidney disease, liver disease, blood or bone marrow problems, oral thrush, yeast infection, or a history of seizures  · This medicine may cause the following problems:  ¨ Brain or nervous system problems, including seizures, meningitis, or vision problems  · Trichomoniasis treatment:  Your doctor may want to also treat your sexual partner, even if he or she has no symptoms  Also, you may want to use a condom during sexual intercourse  These measures will help keep you from getting the infection back again from your partner  If you have any questions, ask your doctor  · Call your doctor if your symptoms do not improve or if they get worse  · Your doctor will do lab tests at regular visits to check on the effects of this medicine  Keep all appointments  · Tell any doctor or dentist who treats you that you are using this medicine  This medicine may affect certain medical test results  · Keep all medicine out of the reach of children  Never share your medicine with anyone  Possible Side Effects While Using This Medicine:   Call your doctor right away if you notice any of these side effects:  · Allergic reaction: Itching or hives, swelling in your face or hands, swelling or tingling in your mouth or throat, chest tightness, trouble breathing  · Confusion, drowsiness, fever, headache, loss of appetite, nausea or vomiting, stiff neck or back  · Dizziness, problems with muscle control, clumsiness, shakiness, trouble talking  · Fever, chills, cough, sore throat, and body aches  · Numbness, tingling, or burning pain in your hands, arms, legs, or feet  · Seizures  If you notice these less serious side effects, talk with your doctor:   · Mild nausea  · Unusual or unpleasant taste in your mouth  If you notice other side effects that you think are caused by this medicine, tell your doctor  Call your doctor for medical advice about side effects   You may report side effects to FDA at 1-800-FDA-1088  © 2017 [facility] Information is for End User's use only and may not be sold, redistributed or otherwise used for commercial purposes  The above information is an  only  It is not intended as medical advice for individual conditions or treatments  Talk to your doctor, nurse or pharmacist before following any medical regimen to see if it is safe and effective for you  Bacterial Vaginosis   WHAT YOU NEED TO KNOW:   What is bacterial vaginosis? Bacterial vaginosis (BV) is an infection in the vagina  It may cause vaginitis, which is irritation and inflammation of the vagina  What causes bacterial vaginosis? The cause of BV is not known  With BV, there is an imbalance in bacteria normally found in the vagina  Your risk for BV increases if you are sexually active  Your risk for BV also increases if you douche or have an intrauterine device (IUD)  What are the signs and symptoms of bacterial vaginosis? Some women have no symptoms  You may have the following:  · White, gray, or yellow vaginal discharge    · Vaginal discharge that smells like fish    · Itching or burning around the outside of your vagina  How is bacterial vaginosis diagnosed? Your healthcare provider will examine you and ask if you have other health conditions  He may need to take a sample of fluid from your vagina  This will be tested for the bacteria that causes BV  How is bacterial vaginosis treated? Antibiotics are given to kill the bacteria that cause BV  They may be given as a pill or as a cream to put in your vagina  Take or use as directed  What are the risks of bacterial vaginosis? If untreated, BV may spread and lead to serious infections in your uterus and fallopian tubes  This can make it more difficult to get pregnant  BV increases your risk for other sexually transmitted infections (STIs), such as chlamydia, gonorrhea, or HIV  How can I prevent bacterial vaginosis?    · Keep your vaginal area clean and dry:  Wear underwear and pantyhose with a cotton crotch  Wipe from front to back after you urinate or have a bowel movement  After bathing, rinse soap from your vaginal area to decrease your risk for irritation  · Do not use products that cause irritation:  Always use unscented tampons or sanitary pads  Do not use feminine sprays, powders, or scented tampons because they may cause irritation and increase your risk of BV  Detergents and fabric softeners may also cause irritation  · Do not douche: This can cause an imbalance in healthy vaginal bacteria  · Use latex condoms: This helps prevent another infection and keeps your partner from getting the infection  When should I contact my healthcare provider? · Your symptoms come back or do not improve with treatment  · You have vaginal bleeding that is not your monthly period  · You have questions or concerns about your condition or care  CARE AGREEMENT:   You have the right to help plan your care  Learn about your health condition and how it may be treated  Discuss treatment options with your caregivers to decide what care you want to receive  You always have the right to refuse treatment  The above information is an  only  It is not intended as medical advice for individual conditions or treatments  Talk to your doctor, nurse or pharmacist before following any medical regimen to see if it is safe and effective for you  © 2017 [address] Information is for End User's use only and may not be sold, redistributed or otherwise used for commercial purposes  All illustrations and images included in CareNotes® are the copyrighted property of A D [employer] M [employer]  or [employer]

## 2019-11-13 LAB
C TRACH DNA SPEC QL NAA+PROBE: NEGATIVE
N GONORRHOEA DNA SPEC QL NAA+PROBE: NEGATIVE

## 2020-04-17 ENCOUNTER — HOSPITAL ENCOUNTER (INPATIENT)
Facility: HOSPITAL | Age: 30
LOS: 4 days | Discharge: HOME | DRG: 364 | End: 2020-04-21
Attending: EMERGENCY MEDICINE | Admitting: INTERNAL MEDICINE
Payer: COMMERCIAL

## 2020-04-17 ENCOUNTER — APPOINTMENT (EMERGENCY)
Dept: RADIOLOGY | Facility: HOSPITAL | Age: 30
DRG: 364 | End: 2020-04-17
Payer: COMMERCIAL

## 2020-04-17 DIAGNOSIS — L03.019 FELON OF FINGER: Primary | ICD-10-CM

## 2020-04-17 DIAGNOSIS — F19.10 POLYSUBSTANCE ABUSE (HCC): ICD-10-CM

## 2020-04-17 DIAGNOSIS — R00.1 BRADYCARDIA: ICD-10-CM

## 2020-04-17 PROBLEM — L08.9 FINGER INFECTION: Status: ACTIVE | Noted: 2020-04-17

## 2020-04-17 LAB
ALBUMIN SERPL BCP-MCNC: 3.5 G/DL (ref 3.5–5)
ALP SERPL-CCNC: 89 U/L (ref 46–116)
ALT SERPL W P-5'-P-CCNC: 25 U/L (ref 12–78)
ANION GAP SERPL CALCULATED.3IONS-SCNC: 4 MMOL/L (ref 4–13)
AST SERPL W P-5'-P-CCNC: 19 U/L (ref 5–45)
BASOPHILS # BLD AUTO: 0.04 THOUSANDS/ΜL (ref 0–0.1)
BASOPHILS NFR BLD AUTO: 0 % (ref 0–1)
BILIRUB SERPL-MCNC: 0.42 MG/DL (ref 0.2–1)
BUN SERPL-MCNC: 15 MG/DL (ref 5–25)
CALCIUM SERPL-MCNC: 8.7 MG/DL (ref 8.3–10.1)
CHLORIDE SERPL-SCNC: 108 MMOL/L (ref 100–108)
CO2 SERPL-SCNC: 29 MMOL/L (ref 21–32)
CREAT SERPL-MCNC: 0.74 MG/DL (ref 0.6–1.3)
EOSINOPHIL # BLD AUTO: 0.14 THOUSAND/ΜL (ref 0–0.61)
EOSINOPHIL NFR BLD AUTO: 1 % (ref 0–6)
ERYTHROCYTE [DISTWIDTH] IN BLOOD BY AUTOMATED COUNT: 11.9 % (ref 11.6–15.1)
GFR SERPL CREATININE-BSD FRML MDRD: 109 ML/MIN/1.73SQ M
GLUCOSE SERPL-MCNC: 87 MG/DL (ref 65–140)
HCT VFR BLD AUTO: 35.2 % (ref 34.8–46.1)
HGB BLD-MCNC: 12.3 G/DL (ref 11.5–15.4)
IMM GRANULOCYTES # BLD AUTO: 0.03 THOUSAND/UL (ref 0–0.2)
IMM GRANULOCYTES NFR BLD AUTO: 0 % (ref 0–2)
LYMPHOCYTES # BLD AUTO: 2.97 THOUSANDS/ΜL (ref 0.6–4.47)
LYMPHOCYTES NFR BLD AUTO: 28 % (ref 14–44)
MCH RBC QN AUTO: 32.6 PG (ref 26.8–34.3)
MCHC RBC AUTO-ENTMCNC: 34.9 G/DL (ref 31.4–37.4)
MCV RBC AUTO: 93 FL (ref 82–98)
MONOCYTES # BLD AUTO: 0.6 THOUSAND/ΜL (ref 0.17–1.22)
MONOCYTES NFR BLD AUTO: 6 % (ref 4–12)
NEUTROPHILS # BLD AUTO: 6.78 THOUSANDS/ΜL (ref 1.85–7.62)
NEUTS SEG NFR BLD AUTO: 65 % (ref 43–75)
NRBC BLD AUTO-RTO: 0 /100 WBCS
PLATELET # BLD AUTO: 170 THOUSANDS/UL (ref 149–390)
PMV BLD AUTO: 11.7 FL (ref 8.9–12.7)
POTASSIUM SERPL-SCNC: 3.5 MMOL/L (ref 3.5–5.3)
PROT SERPL-MCNC: 6.9 G/DL (ref 6.4–8.2)
RBC # BLD AUTO: 3.77 MILLION/UL (ref 3.81–5.12)
SODIUM SERPL-SCNC: 141 MMOL/L (ref 136–145)
WBC # BLD AUTO: 10.56 THOUSAND/UL (ref 4.31–10.16)

## 2020-04-17 PROCEDURE — 87186 SC STD MICRODIL/AGAR DIL: CPT | Performed by: PHYSICIAN ASSISTANT

## 2020-04-17 PROCEDURE — 87081 CULTURE SCREEN ONLY: CPT | Performed by: INTERNAL MEDICINE

## 2020-04-17 PROCEDURE — 87070 CULTURE OTHR SPECIMN AEROBIC: CPT | Performed by: PHYSICIAN ASSISTANT

## 2020-04-17 PROCEDURE — 80053 COMPREHEN METABOLIC PANEL: CPT | Performed by: PHYSICIAN ASSISTANT

## 2020-04-17 PROCEDURE — 36415 COLL VENOUS BLD VENIPUNCTURE: CPT | Performed by: PHYSICIAN ASSISTANT

## 2020-04-17 PROCEDURE — 99223 1ST HOSP IP/OBS HIGH 75: CPT | Performed by: INTERNAL MEDICINE

## 2020-04-17 PROCEDURE — 73130 X-RAY EXAM OF HAND: CPT

## 2020-04-17 PROCEDURE — 87147 CULTURE TYPE IMMUNOLOGIC: CPT | Performed by: PHYSICIAN ASSISTANT

## 2020-04-17 PROCEDURE — NC001 PR NO CHARGE: Performed by: PLASTIC SURGERY

## 2020-04-17 PROCEDURE — 99284 EMERGENCY DEPT VISIT MOD MDM: CPT

## 2020-04-17 PROCEDURE — 96374 THER/PROPH/DIAG INJ IV PUSH: CPT

## 2020-04-17 PROCEDURE — 0H9FXZX DRAINAGE OF RIGHT HAND SKIN, EXTERNAL APPROACH, DIAGNOSTIC: ICD-10-PCS | Performed by: INTERNAL MEDICINE

## 2020-04-17 PROCEDURE — 87205 SMEAR GRAM STAIN: CPT | Performed by: PHYSICIAN ASSISTANT

## 2020-04-17 PROCEDURE — 99284 EMERGENCY DEPT VISIT MOD MDM: CPT | Performed by: PHYSICIAN ASSISTANT

## 2020-04-17 PROCEDURE — 85025 COMPLETE CBC W/AUTO DIFF WBC: CPT | Performed by: PHYSICIAN ASSISTANT

## 2020-04-17 RX ORDER — ACETAMINOPHEN 325 MG/1
650 TABLET ORAL EVERY 6 HOURS PRN
Status: DISCONTINUED | OUTPATIENT
Start: 2020-04-17 | End: 2020-04-18

## 2020-04-17 RX ORDER — ONDANSETRON 2 MG/ML
4 INJECTION INTRAMUSCULAR; INTRAVENOUS EVERY 4 HOURS PRN
Status: DISCONTINUED | OUTPATIENT
Start: 2020-04-17 | End: 2020-04-21 | Stop reason: HOSPADM

## 2020-04-17 RX ORDER — LIDOCAINE HYDROCHLORIDE AND EPINEPHRINE 10; 10 MG/ML; UG/ML
5 INJECTION, SOLUTION INFILTRATION; PERINEURAL ONCE
Status: COMPLETED | OUTPATIENT
Start: 2020-04-17 | End: 2020-04-17

## 2020-04-17 RX ADMIN — LIDOCAINE HYDROCHLORIDE,EPINEPHRINE BITARTRATE 5 ML: 10; .01 INJECTION, SOLUTION INFILTRATION; PERINEURAL at 10:37

## 2020-04-17 RX ADMIN — SODIUM CHLORIDE 3 G: 9 INJECTION, SOLUTION INTRAVENOUS at 10:01

## 2020-04-17 RX ADMIN — AMPICILLIN SODIUM AND SULBACTAM SODIUM 3 G: 2; 1 INJECTION, POWDER, FOR SOLUTION INTRAMUSCULAR; INTRAVENOUS at 23:23

## 2020-04-17 RX ADMIN — AMPICILLIN SODIUM AND SULBACTAM SODIUM 3 G: 2; 1 INJECTION, POWDER, FOR SOLUTION INTRAMUSCULAR; INTRAVENOUS at 17:56

## 2020-04-17 RX ADMIN — ACETAMINOPHEN 650 MG: 325 TABLET ORAL at 23:22

## 2020-04-18 PROBLEM — L03.019 FELON OF FINGER: Status: ACTIVE | Noted: 2020-04-17

## 2020-04-18 PROCEDURE — 99232 SBSQ HOSP IP/OBS MODERATE 35: CPT | Performed by: PHYSICIAN ASSISTANT

## 2020-04-18 PROCEDURE — 99252 IP/OBS CONSLTJ NEW/EST SF 35: CPT | Performed by: PLASTIC SURGERY

## 2020-04-18 PROCEDURE — 93005 ELECTROCARDIOGRAM TRACING: CPT | Performed by: PHYSICIAN ASSISTANT

## 2020-04-18 PROCEDURE — 0J9J0ZZ DRAINAGE OF RIGHT HAND SUBCUTANEOUS TISSUE AND FASCIA, OPEN APPROACH: ICD-10-PCS | Performed by: PLASTIC SURGERY

## 2020-04-18 RX ORDER — KETOROLAC TROMETHAMINE 30 MG/ML
30 INJECTION, SOLUTION INTRAMUSCULAR; INTRAVENOUS EVERY 6 HOURS SCHEDULED
Status: DISCONTINUED | OUTPATIENT
Start: 2020-04-18 | End: 2020-04-19

## 2020-04-18 RX ORDER — METOCLOPRAMIDE HYDROCHLORIDE 5 MG/ML
10 INJECTION INTRAMUSCULAR; INTRAVENOUS EVERY 6 HOURS PRN
Status: COMPLETED | OUTPATIENT
Start: 2020-04-18 | End: 2020-04-20

## 2020-04-18 RX ORDER — IBUPROFEN 600 MG/1
600 TABLET ORAL EVERY 6 HOURS PRN
Status: DISCONTINUED | OUTPATIENT
Start: 2020-04-18 | End: 2020-04-18

## 2020-04-18 RX ORDER — ONDANSETRON 2 MG/ML
4 INJECTION INTRAMUSCULAR; INTRAVENOUS ONCE
Status: COMPLETED | OUTPATIENT
Start: 2020-04-18 | End: 2020-04-18

## 2020-04-18 RX ORDER — DIAZEPAM 5 MG/ML
5 INJECTION, SOLUTION INTRAMUSCULAR; INTRAVENOUS EVERY 6 HOURS PRN
Status: DISCONTINUED | OUTPATIENT
Start: 2020-04-18 | End: 2020-04-20

## 2020-04-18 RX ORDER — ACETAMINOPHEN 325 MG/1
975 TABLET ORAL EVERY 8 HOURS SCHEDULED
Status: DISCONTINUED | OUTPATIENT
Start: 2020-04-18 | End: 2020-04-21 | Stop reason: HOSPADM

## 2020-04-18 RX ORDER — KETOROLAC TROMETHAMINE 30 MG/ML
15 INJECTION, SOLUTION INTRAMUSCULAR; INTRAVENOUS EVERY 6 HOURS PRN
Status: COMPLETED | OUTPATIENT
Start: 2020-04-18 | End: 2020-04-18

## 2020-04-18 RX ADMIN — AMPICILLIN SODIUM AND SULBACTAM SODIUM 3 G: 2; 1 INJECTION, POWDER, FOR SOLUTION INTRAMUSCULAR; INTRAVENOUS at 23:22

## 2020-04-18 RX ADMIN — ACETAMINOPHEN 975 MG: 325 TABLET ORAL at 21:06

## 2020-04-18 RX ADMIN — DIAZEPAM 5 MG: 10 INJECTION, SOLUTION INTRAMUSCULAR; INTRAVENOUS at 20:27

## 2020-04-18 RX ADMIN — AMPICILLIN SODIUM AND SULBACTAM SODIUM 3 G: 2; 1 INJECTION, POWDER, FOR SOLUTION INTRAMUSCULAR; INTRAVENOUS at 05:07

## 2020-04-18 RX ADMIN — ONDANSETRON 4 MG: 2 INJECTION INTRAMUSCULAR; INTRAVENOUS at 23:32

## 2020-04-18 RX ADMIN — IBUPROFEN 600 MG: 600 TABLET ORAL at 06:17

## 2020-04-18 RX ADMIN — ACETAMINOPHEN 650 MG: 325 TABLET ORAL at 07:53

## 2020-04-18 RX ADMIN — ONDANSETRON 4 MG: 2 INJECTION INTRAMUSCULAR; INTRAVENOUS at 18:19

## 2020-04-18 RX ADMIN — ACETAMINOPHEN 975 MG: 325 TABLET ORAL at 13:16

## 2020-04-18 RX ADMIN — ONDANSETRON 4 MG: 2 INJECTION INTRAMUSCULAR; INTRAVENOUS at 21:55

## 2020-04-18 RX ADMIN — METOCLOPRAMIDE 10 MG: 5 INJECTION, SOLUTION INTRAMUSCULAR; INTRAVENOUS at 20:27

## 2020-04-18 RX ADMIN — KETOROLAC TROMETHAMINE 30 MG: 30 INJECTION, SOLUTION INTRAMUSCULAR at 17:06

## 2020-04-18 RX ADMIN — IBUPROFEN 600 MG: 600 TABLET ORAL at 11:48

## 2020-04-18 RX ADMIN — KETOROLAC TROMETHAMINE 15 MG: 30 INJECTION, SOLUTION INTRAMUSCULAR at 11:48

## 2020-04-18 RX ADMIN — AMPICILLIN SODIUM AND SULBACTAM SODIUM 3 G: 2; 1 INJECTION, POWDER, FOR SOLUTION INTRAMUSCULAR; INTRAVENOUS at 09:53

## 2020-04-18 RX ADMIN — AMPICILLIN SODIUM AND SULBACTAM SODIUM 3 G: 2; 1 INJECTION, POWDER, FOR SOLUTION INTRAMUSCULAR; INTRAVENOUS at 17:06

## 2020-04-18 RX ADMIN — KETOROLAC TROMETHAMINE 30 MG: 30 INJECTION, SOLUTION INTRAMUSCULAR at 23:20

## 2020-04-18 RX ADMIN — KETOROLAC TROMETHAMINE 15 MG: 30 INJECTION, SOLUTION INTRAMUSCULAR at 06:05

## 2020-04-19 PROBLEM — I49.8 JUNCTIONAL RHYTHM: Status: ACTIVE | Noted: 2020-04-19

## 2020-04-19 PROBLEM — R00.1 BRADYCARDIA: Status: ACTIVE | Noted: 2020-04-19

## 2020-04-19 LAB
ANION GAP SERPL CALCULATED.3IONS-SCNC: 7 MMOL/L (ref 4–13)
ATRIAL RATE: 54 BPM
ATRIAL RATE: 80 BPM
BACTERIA WND AEROBE CULT: ABNORMAL
BASOPHILS # BLD AUTO: 0.03 THOUSANDS/ΜL (ref 0–0.1)
BASOPHILS NFR BLD AUTO: 0 % (ref 0–1)
BUN SERPL-MCNC: 16 MG/DL (ref 5–25)
CALCIUM SERPL-MCNC: 8.8 MG/DL (ref 8.3–10.1)
CHLORIDE SERPL-SCNC: 112 MMOL/L (ref 100–108)
CO2 SERPL-SCNC: 25 MMOL/L (ref 21–32)
CREAT SERPL-MCNC: 0.74 MG/DL (ref 0.6–1.3)
EOSINOPHIL # BLD AUTO: 0 THOUSAND/ΜL (ref 0–0.61)
EOSINOPHIL NFR BLD AUTO: 0 % (ref 0–6)
ERYTHROCYTE [DISTWIDTH] IN BLOOD BY AUTOMATED COUNT: 12 % (ref 11.6–15.1)
GFR SERPL CREATININE-BSD FRML MDRD: 109 ML/MIN/1.73SQ M
GLUCOSE SERPL-MCNC: 110 MG/DL (ref 65–140)
GRAM STN SPEC: ABNORMAL
GRAM STN SPEC: ABNORMAL
HCT VFR BLD AUTO: 38.1 % (ref 34.8–46.1)
HGB BLD-MCNC: 13.3 G/DL (ref 11.5–15.4)
IMM GRANULOCYTES # BLD AUTO: 0.05 THOUSAND/UL (ref 0–0.2)
IMM GRANULOCYTES NFR BLD AUTO: 0 % (ref 0–2)
LYMPHOCYTES # BLD AUTO: 1.02 THOUSANDS/ΜL (ref 0.6–4.47)
LYMPHOCYTES NFR BLD AUTO: 7 % (ref 14–44)
MAGNESIUM SERPL-MCNC: 2.1 MG/DL (ref 1.6–2.6)
MCH RBC QN AUTO: 32.4 PG (ref 26.8–34.3)
MCHC RBC AUTO-ENTMCNC: 34.9 G/DL (ref 31.4–37.4)
MCV RBC AUTO: 93 FL (ref 82–98)
MONOCYTES # BLD AUTO: 0.21 THOUSAND/ΜL (ref 0.17–1.22)
MONOCYTES NFR BLD AUTO: 2 % (ref 4–12)
MRSA NOSE QL CULT: NORMAL
NEUTROPHILS # BLD AUTO: 12.96 THOUSANDS/ΜL (ref 1.85–7.62)
NEUTS SEG NFR BLD AUTO: 91 % (ref 43–75)
NRBC BLD AUTO-RTO: 0 /100 WBCS
P AXIS: 34 DEGREES
PLATELET # BLD AUTO: 190 THOUSANDS/UL (ref 149–390)
PMV BLD AUTO: 12.8 FL (ref 8.9–12.7)
POTASSIUM SERPL-SCNC: 3.4 MMOL/L (ref 3.5–5.3)
PR INTERVAL: 106 MS
QRS AXIS: 77 DEGREES
QRS AXIS: 85 DEGREES
QRSD INTERVAL: 110 MS
QRSD INTERVAL: 96 MS
QT INTERVAL: 396 MS
QT INTERVAL: 436 MS
QTC INTERVAL: 413 MS
QTC INTERVAL: 456 MS
RBC # BLD AUTO: 4.1 MILLION/UL (ref 3.81–5.12)
SODIUM SERPL-SCNC: 144 MMOL/L (ref 136–145)
T WAVE AXIS: 31 DEGREES
T WAVE AXIS: 57 DEGREES
VENTRICULAR RATE: 54 BPM
VENTRICULAR RATE: 80 BPM
WBC # BLD AUTO: 14.27 THOUSAND/UL (ref 4.31–10.16)

## 2020-04-19 PROCEDURE — ND001 PR NO DOCUMENTATION: Performed by: PLASTIC SURGERY

## 2020-04-19 PROCEDURE — 83735 ASSAY OF MAGNESIUM: CPT | Performed by: PHYSICIAN ASSISTANT

## 2020-04-19 PROCEDURE — 80048 BASIC METABOLIC PNL TOTAL CA: CPT | Performed by: PHYSICIAN ASSISTANT

## 2020-04-19 PROCEDURE — 99222 1ST HOSP IP/OBS MODERATE 55: CPT | Performed by: INTERNAL MEDICINE

## 2020-04-19 PROCEDURE — 85025 COMPLETE CBC W/AUTO DIFF WBC: CPT | Performed by: PHYSICIAN ASSISTANT

## 2020-04-19 PROCEDURE — 93010 ELECTROCARDIOGRAM REPORT: CPT | Performed by: INTERNAL MEDICINE

## 2020-04-19 PROCEDURE — 93005 ELECTROCARDIOGRAM TRACING: CPT

## 2020-04-19 PROCEDURE — 99232 SBSQ HOSP IP/OBS MODERATE 35: CPT | Performed by: PHYSICIAN ASSISTANT

## 2020-04-19 RX ORDER — VANCOMYCIN HYDROCHLORIDE 1 G/200ML
15 INJECTION, SOLUTION INTRAVENOUS EVERY 12 HOURS
Status: DISCONTINUED | OUTPATIENT
Start: 2020-04-19 | End: 2020-04-20

## 2020-04-19 RX ORDER — OXYCODONE HYDROCHLORIDE 10 MG/1
10 TABLET ORAL EVERY 4 HOURS PRN
Status: DISCONTINUED | OUTPATIENT
Start: 2020-04-19 | End: 2020-04-20

## 2020-04-19 RX ORDER — MORPHINE SULFATE 4 MG/ML
4 INJECTION, SOLUTION INTRAMUSCULAR; INTRAVENOUS EVERY 4 HOURS PRN
Status: DISCONTINUED | OUTPATIENT
Start: 2020-04-19 | End: 2020-04-20

## 2020-04-19 RX ORDER — OXYCODONE HYDROCHLORIDE 5 MG/1
5 TABLET ORAL EVERY 4 HOURS PRN
Status: DISCONTINUED | OUTPATIENT
Start: 2020-04-19 | End: 2020-04-20

## 2020-04-19 RX ADMIN — DIAZEPAM 5 MG: 10 INJECTION, SOLUTION INTRAMUSCULAR; INTRAVENOUS at 09:00

## 2020-04-19 RX ADMIN — DIAZEPAM 5 MG: 10 INJECTION, SOLUTION INTRAMUSCULAR; INTRAVENOUS at 02:45

## 2020-04-19 RX ADMIN — ACETAMINOPHEN 975 MG: 325 TABLET ORAL at 12:38

## 2020-04-19 RX ADMIN — KETOROLAC TROMETHAMINE 30 MG: 30 INJECTION, SOLUTION INTRAMUSCULAR at 05:38

## 2020-04-19 RX ADMIN — MORPHINE SULFATE 4 MG: 4 INJECTION INTRAVENOUS at 09:48

## 2020-04-19 RX ADMIN — OXYCODONE HYDROCHLORIDE 10 MG: 10 TABLET ORAL at 17:08

## 2020-04-19 RX ADMIN — OXYCODONE HYDROCHLORIDE 10 MG: 10 TABLET ORAL at 22:04

## 2020-04-19 RX ADMIN — VANCOMYCIN HYDROCHLORIDE 1000 MG: 1 INJECTION, SOLUTION INTRAVENOUS at 10:34

## 2020-04-19 RX ADMIN — METOCLOPRAMIDE 10 MG: 5 INJECTION, SOLUTION INTRAMUSCULAR; INTRAVENOUS at 19:08

## 2020-04-19 RX ADMIN — MORPHINE SULFATE 4 MG: 4 INJECTION INTRAVENOUS at 14:45

## 2020-04-19 RX ADMIN — OXYCODONE HYDROCHLORIDE 10 MG: 10 TABLET ORAL at 10:37

## 2020-04-19 RX ADMIN — AMPICILLIN SODIUM AND SULBACTAM SODIUM 3 G: 2; 1 INJECTION, POWDER, FOR SOLUTION INTRAMUSCULAR; INTRAVENOUS at 05:38

## 2020-04-19 RX ADMIN — ACETAMINOPHEN 975 MG: 325 TABLET ORAL at 22:04

## 2020-04-19 RX ADMIN — VANCOMYCIN HYDROCHLORIDE 1000 MG: 1 INJECTION, SOLUTION INTRAVENOUS at 22:01

## 2020-04-20 PROCEDURE — C9113 INJ PANTOPRAZOLE SODIUM, VIA: HCPCS | Performed by: NURSE PRACTITIONER

## 2020-04-20 PROCEDURE — 99232 SBSQ HOSP IP/OBS MODERATE 35: CPT | Performed by: NURSE PRACTITIONER

## 2020-04-20 PROCEDURE — 99232 SBSQ HOSP IP/OBS MODERATE 35: CPT | Performed by: INTERNAL MEDICINE

## 2020-04-20 PROCEDURE — 99253 IP/OBS CNSLTJ NEW/EST LOW 45: CPT | Performed by: NURSE PRACTITIONER

## 2020-04-20 RX ORDER — CLONIDINE HYDROCHLORIDE 0.1 MG/1
0.1 TABLET ORAL EVERY 12 HOURS SCHEDULED
Status: DISCONTINUED | OUTPATIENT
Start: 2020-04-20 | End: 2020-04-21 | Stop reason: HOSPADM

## 2020-04-20 RX ORDER — HYDROMORPHONE HCL/PF 1 MG/ML
0.5 SYRINGE (ML) INJECTION EVERY 2 HOUR PRN
Status: DISCONTINUED | OUTPATIENT
Start: 2020-04-20 | End: 2020-04-21 | Stop reason: HOSPADM

## 2020-04-20 RX ORDER — METOCLOPRAMIDE HYDROCHLORIDE 5 MG/ML
5 INJECTION INTRAMUSCULAR; INTRAVENOUS EVERY 6 HOURS PRN
Status: DISCONTINUED | OUTPATIENT
Start: 2020-04-20 | End: 2020-04-20

## 2020-04-20 RX ORDER — HYDROXYZINE HYDROCHLORIDE 25 MG/1
25 TABLET, FILM COATED ORAL EVERY 6 HOURS
Status: DISCONTINUED | OUTPATIENT
Start: 2020-04-20 | End: 2020-04-21 | Stop reason: HOSPADM

## 2020-04-20 RX ORDER — PANTOPRAZOLE SODIUM 40 MG/1
40 INJECTION, POWDER, FOR SOLUTION INTRAVENOUS
Status: DISCONTINUED | OUTPATIENT
Start: 2020-04-20 | End: 2020-04-21 | Stop reason: HOSPADM

## 2020-04-20 RX ORDER — LOPERAMIDE HYDROCHLORIDE 2 MG/1
2 CAPSULE ORAL 3 TIMES DAILY PRN
Status: DISCONTINUED | OUTPATIENT
Start: 2020-04-20 | End: 2020-04-21 | Stop reason: HOSPADM

## 2020-04-20 RX ORDER — CEFAZOLIN SODIUM 1 G/50ML
1000 SOLUTION INTRAVENOUS EVERY 8 HOURS
Status: DISCONTINUED | OUTPATIENT
Start: 2020-04-20 | End: 2020-04-21 | Stop reason: HOSPADM

## 2020-04-20 RX ORDER — LORAZEPAM 2 MG/ML
0.5 INJECTION INTRAMUSCULAR EVERY 4 HOURS PRN
Status: DISCONTINUED | OUTPATIENT
Start: 2020-04-20 | End: 2020-04-21 | Stop reason: HOSPADM

## 2020-04-20 RX ORDER — IBUPROFEN 400 MG/1
400 TABLET ORAL EVERY 6 HOURS PRN
Status: DISCONTINUED | OUTPATIENT
Start: 2020-04-20 | End: 2020-04-21 | Stop reason: HOSPADM

## 2020-04-20 RX ORDER — GABAPENTIN 300 MG/1
300 CAPSULE ORAL
Status: DISCONTINUED | OUTPATIENT
Start: 2020-04-20 | End: 2020-04-21

## 2020-04-20 RX ADMIN — ACETAMINOPHEN 975 MG: 325 TABLET ORAL at 05:49

## 2020-04-20 RX ADMIN — ONDANSETRON 4 MG: 2 INJECTION INTRAMUSCULAR; INTRAVENOUS at 11:55

## 2020-04-20 RX ADMIN — CEFAZOLIN SODIUM 1000 MG: 1 SOLUTION INTRAVENOUS at 23:58

## 2020-04-20 RX ADMIN — CEFAZOLIN SODIUM 1000 MG: 1 SOLUTION INTRAVENOUS at 16:30

## 2020-04-20 RX ADMIN — DIAZEPAM 5 MG: 10 INJECTION, SOLUTION INTRAMUSCULAR; INTRAVENOUS at 11:55

## 2020-04-20 RX ADMIN — MORPHINE SULFATE 4 MG: 4 INJECTION INTRAVENOUS at 00:57

## 2020-04-20 RX ADMIN — METOCLOPRAMIDE 10 MG: 5 INJECTION, SOLUTION INTRAMUSCULAR; INTRAVENOUS at 14:46

## 2020-04-20 RX ADMIN — MORPHINE SULFATE 4 MG: 4 INJECTION INTRAVENOUS at 13:14

## 2020-04-20 RX ADMIN — OXYCODONE HYDROCHLORIDE 10 MG: 10 TABLET ORAL at 10:44

## 2020-04-20 RX ADMIN — MORPHINE SULFATE 2 MG: 2 INJECTION, SOLUTION INTRAMUSCULAR; INTRAVENOUS at 15:38

## 2020-04-20 RX ADMIN — VANCOMYCIN HYDROCHLORIDE 1000 MG: 1 INJECTION, SOLUTION INTRAVENOUS at 10:38

## 2020-04-20 RX ADMIN — CLONIDINE HYDROCHLORIDE 0.1 MG: 0.1 TABLET ORAL at 20:36

## 2020-04-20 RX ADMIN — OXYCODONE HYDROCHLORIDE 10 MG: 10 TABLET ORAL at 05:49

## 2020-04-20 RX ADMIN — PANTOPRAZOLE SODIUM 40 MG: 40 INJECTION, POWDER, FOR SOLUTION INTRAVENOUS at 16:30

## 2020-04-20 RX ADMIN — OXYCODONE HYDROCHLORIDE 10 MG: 10 TABLET ORAL at 02:06

## 2020-04-21 VITALS
SYSTOLIC BLOOD PRESSURE: 98 MMHG | HEIGHT: 65 IN | RESPIRATION RATE: 18 BRPM | WEIGHT: 145 LBS | OXYGEN SATURATION: 97 % | TEMPERATURE: 98.7 F | BODY MASS INDEX: 24.16 KG/M2 | DIASTOLIC BLOOD PRESSURE: 66 MMHG | HEART RATE: 78 BPM

## 2020-04-21 PROBLEM — I49.8 JUNCTIONAL RHYTHM: Status: RESOLVED | Noted: 2020-04-19 | Resolved: 2020-04-21

## 2020-04-21 LAB
ALBUMIN SERPL BCP-MCNC: 3.4 G/DL (ref 3.5–5)
ALP SERPL-CCNC: 85 U/L (ref 46–116)
ALT SERPL W P-5'-P-CCNC: 18 U/L (ref 12–78)
ANION GAP SERPL CALCULATED.3IONS-SCNC: 6 MMOL/L (ref 4–13)
AST SERPL W P-5'-P-CCNC: 12 U/L (ref 5–45)
BILIRUB SERPL-MCNC: 0.39 MG/DL (ref 0.2–1)
BUN SERPL-MCNC: 14 MG/DL (ref 5–25)
CALCIUM SERPL-MCNC: 8.7 MG/DL (ref 8.3–10.1)
CHLORIDE SERPL-SCNC: 107 MMOL/L (ref 100–108)
CO2 SERPL-SCNC: 26 MMOL/L (ref 21–32)
CREAT SERPL-MCNC: 0.82 MG/DL (ref 0.6–1.3)
ERYTHROCYTE [DISTWIDTH] IN BLOOD BY AUTOMATED COUNT: 11.9 % (ref 11.6–15.1)
GFR SERPL CREATININE-BSD FRML MDRD: 96 ML/MIN/1.73SQ M
GLUCOSE SERPL-MCNC: 88 MG/DL (ref 65–140)
HCT VFR BLD AUTO: 38.7 % (ref 34.8–46.1)
HGB BLD-MCNC: 13.3 G/DL (ref 11.5–15.4)
MCH RBC QN AUTO: 32.1 PG (ref 26.8–34.3)
MCHC RBC AUTO-ENTMCNC: 34.4 G/DL (ref 31.4–37.4)
MCV RBC AUTO: 94 FL (ref 82–98)
PLATELET # BLD AUTO: 206 THOUSANDS/UL (ref 149–390)
PMV BLD AUTO: 12.5 FL (ref 8.9–12.7)
POTASSIUM SERPL-SCNC: 3.3 MMOL/L (ref 3.5–5.3)
PROT SERPL-MCNC: 6.6 G/DL (ref 6.4–8.2)
RBC # BLD AUTO: 4.14 MILLION/UL (ref 3.81–5.12)
SODIUM SERPL-SCNC: 139 MMOL/L (ref 136–145)
WBC # BLD AUTO: 10.29 THOUSAND/UL (ref 4.31–10.16)

## 2020-04-21 PROCEDURE — 85027 COMPLETE CBC AUTOMATED: CPT | Performed by: NURSE PRACTITIONER

## 2020-04-21 PROCEDURE — 99233 SBSQ HOSP IP/OBS HIGH 50: CPT | Performed by: NURSE PRACTITIONER

## 2020-04-21 PROCEDURE — 80053 COMPREHEN METABOLIC PANEL: CPT | Performed by: NURSE PRACTITIONER

## 2020-04-21 PROCEDURE — 99239 HOSP IP/OBS DSCHRG MGMT >30: CPT | Performed by: PHYSICIAN ASSISTANT

## 2020-04-21 RX ORDER — POTASSIUM CHLORIDE 20 MEQ/1
40 TABLET, EXTENDED RELEASE ORAL ONCE
Status: COMPLETED | OUTPATIENT
Start: 2020-04-21 | End: 2020-04-21

## 2020-04-21 RX ORDER — GABAPENTIN 300 MG/1
300 CAPSULE ORAL 2 TIMES DAILY
Status: DISCONTINUED | OUTPATIENT
Start: 2020-04-21 | End: 2020-04-21 | Stop reason: HOSPADM

## 2020-04-21 RX ORDER — CLONIDINE HYDROCHLORIDE 0.1 MG/1
0.1 TABLET ORAL EVERY 12 HOURS SCHEDULED
Qty: 10 TABLET | Refills: 0 | Status: SHIPPED | OUTPATIENT
Start: 2020-04-21

## 2020-04-21 RX ORDER — CEPHALEXIN 500 MG/1
500 CAPSULE ORAL EVERY 6 HOURS SCHEDULED
Qty: 16 CAPSULE | Refills: 0 | Status: SHIPPED | OUTPATIENT
Start: 2020-04-21 | End: 2020-04-25

## 2020-04-21 RX ADMIN — CEFAZOLIN SODIUM 1000 MG: 1 SOLUTION INTRAVENOUS at 09:00

## 2020-04-21 RX ADMIN — HYDROMORPHONE HYDROCHLORIDE 0.5 MG: 1 INJECTION, SOLUTION INTRAMUSCULAR; INTRAVENOUS; SUBCUTANEOUS at 06:13

## 2020-04-21 RX ADMIN — POTASSIUM CHLORIDE 40 MEQ: 1500 TABLET, EXTENDED RELEASE ORAL at 09:00

## 2020-04-21 RX ADMIN — HYDROMORPHONE HYDROCHLORIDE 0.5 MG: 1 INJECTION, SOLUTION INTRAMUSCULAR; INTRAVENOUS; SUBCUTANEOUS at 03:51

## 2021-05-18 ENCOUNTER — HOSPITAL ENCOUNTER (EMERGENCY)
Facility: HOSPITAL | Age: 31
Discharge: HOME | End: 2021-05-19
Attending: EMERGENCY MEDICINE
Payer: COMMERCIAL

## 2021-05-18 VITALS
SYSTOLIC BLOOD PRESSURE: 139 MMHG | HEART RATE: 62 BPM | BODY MASS INDEX: 24.99 KG/M2 | DIASTOLIC BLOOD PRESSURE: 73 MMHG | TEMPERATURE: 97.9 F | WEIGHT: 150 LBS | OXYGEN SATURATION: 99 % | HEIGHT: 65 IN | RESPIRATION RATE: 16 BRPM

## 2021-05-18 DIAGNOSIS — R11.2 NAUSEA & VOMITING: ICD-10-CM

## 2021-05-18 DIAGNOSIS — F11.10 HEROIN ABUSE (HCC): Primary | ICD-10-CM

## 2021-05-18 LAB
ALBUMIN SERPL BCP-MCNC: 3.9 G/DL (ref 3.5–5)
ALP SERPL-CCNC: 74 U/L (ref 46–116)
ALT SERPL W P-5'-P-CCNC: 24 U/L (ref 12–78)
AMPHETAMINES SERPL QL SCN: NEGATIVE
ANION GAP SERPL CALCULATED.3IONS-SCNC: 6 MMOL/L (ref 4–13)
AST SERPL W P-5'-P-CCNC: 21 U/L (ref 5–45)
BARBITURATES UR QL: NEGATIVE
BASOPHILS # BLD AUTO: 0.03 THOUSANDS/ΜL (ref 0–0.1)
BASOPHILS NFR BLD AUTO: 0 % (ref 0–1)
BENZODIAZ UR QL: NEGATIVE
BILIRUB SERPL-MCNC: 0.38 MG/DL (ref 0.2–1)
BILIRUB UR QL STRIP: NEGATIVE
BUN SERPL-MCNC: 12 MG/DL (ref 5–25)
CALCIUM SERPL-MCNC: 9.3 MG/DL (ref 8.3–10.1)
CHLORIDE SERPL-SCNC: 107 MMOL/L (ref 100–108)
CLARITY UR: CLEAR
CLARITY, POC: CLEAR
CO2 SERPL-SCNC: 28 MMOL/L (ref 21–32)
COCAINE UR QL: POSITIVE
COLOR UR: YELLOW
COLOR, POC: YELLOW
CREAT SERPL-MCNC: 0.94 MG/DL (ref 0.6–1.3)
EOSINOPHIL # BLD AUTO: 0.04 THOUSAND/ΜL (ref 0–0.61)
EOSINOPHIL NFR BLD AUTO: 0 % (ref 0–6)
ERYTHROCYTE [DISTWIDTH] IN BLOOD BY AUTOMATED COUNT: 11.7 % (ref 11.6–15.1)
ETHANOL EXG-MCNC: 0 MG/DL
EXT PREG TEST URINE: NEGATIVE
EXT. CONTROL ED NAV: NORMAL
GFR SERPL CREATININE-BSD FRML MDRD: 81 ML/MIN/1.73SQ M
GLUCOSE SERPL-MCNC: 124 MG/DL (ref 65–140)
GLUCOSE UR STRIP-MCNC: NEGATIVE MG/DL
HCG SERPL QL: NEGATIVE
HCT VFR BLD AUTO: 40.3 % (ref 34.8–46.1)
HGB BLD-MCNC: 13.9 G/DL (ref 11.5–15.4)
HGB UR QL STRIP.AUTO: NEGATIVE
IMM GRANULOCYTES # BLD AUTO: 0.05 THOUSAND/UL (ref 0–0.2)
IMM GRANULOCYTES NFR BLD AUTO: 0 % (ref 0–2)
KETONES UR STRIP-MCNC: NEGATIVE MG/DL
LEUKOCYTE ESTERASE UR QL STRIP: NEGATIVE
LIPASE SERPL-CCNC: 87 U/L (ref 73–393)
LYMPHOCYTES # BLD AUTO: 1.64 THOUSANDS/ΜL (ref 0.6–4.47)
LYMPHOCYTES NFR BLD AUTO: 14 % (ref 14–44)
MCH RBC QN AUTO: 32.8 PG (ref 26.8–34.3)
MCHC RBC AUTO-ENTMCNC: 34.5 G/DL (ref 31.4–37.4)
MCV RBC AUTO: 95 FL (ref 82–98)
METHADONE UR QL: NEGATIVE
MONOCYTES # BLD AUTO: 0.27 THOUSAND/ΜL (ref 0.17–1.22)
MONOCYTES NFR BLD AUTO: 2 % (ref 4–12)
NEUTROPHILS # BLD AUTO: 9.78 THOUSANDS/ΜL (ref 1.85–7.62)
NEUTS SEG NFR BLD AUTO: 84 % (ref 43–75)
NITRITE UR QL STRIP: NEGATIVE
NRBC BLD AUTO-RTO: 0 /100 WBCS
OPIATES UR QL SCN: NEGATIVE
OXYCODONE+OXYMORPHONE UR QL SCN: NEGATIVE
PCP UR QL: NEGATIVE
PH UR STRIP.AUTO: 8.5 [PH] (ref 4.5–8)
PLATELET # BLD AUTO: 231 THOUSANDS/UL (ref 149–390)
PMV BLD AUTO: 11.4 FL (ref 8.9–12.7)
POTASSIUM SERPL-SCNC: 3.9 MMOL/L (ref 3.5–5.3)
PROT SERPL-MCNC: 7.1 G/DL (ref 6.4–8.2)
PROT UR STRIP-MCNC: NEGATIVE MG/DL
RBC # BLD AUTO: 4.24 MILLION/UL (ref 3.81–5.12)
SODIUM SERPL-SCNC: 141 MMOL/L (ref 136–145)
SP GR UR STRIP.AUTO: 1.02 (ref 1–1.03)
THC UR QL: POSITIVE
UROBILINOGEN UR QL STRIP.AUTO: 0.2 E.U./DL
WBC # BLD AUTO: 11.81 THOUSAND/UL (ref 4.31–10.16)

## 2021-05-18 PROCEDURE — 81025 URINE PREGNANCY TEST: CPT | Performed by: EMERGENCY MEDICINE

## 2021-05-18 PROCEDURE — 96361 HYDRATE IV INFUSION ADD-ON: CPT

## 2021-05-18 PROCEDURE — 96374 THER/PROPH/DIAG INJ IV PUSH: CPT

## 2021-05-18 PROCEDURE — 81003 URINALYSIS AUTO W/O SCOPE: CPT

## 2021-05-18 PROCEDURE — 80307 DRUG TEST PRSMV CHEM ANLYZR: CPT | Performed by: EMERGENCY MEDICINE

## 2021-05-18 PROCEDURE — 36415 COLL VENOUS BLD VENIPUNCTURE: CPT | Performed by: EMERGENCY MEDICINE

## 2021-05-18 PROCEDURE — 99284 EMERGENCY DEPT VISIT MOD MDM: CPT | Performed by: EMERGENCY MEDICINE

## 2021-05-18 PROCEDURE — 85025 COMPLETE CBC W/AUTO DIFF WBC: CPT | Performed by: EMERGENCY MEDICINE

## 2021-05-18 PROCEDURE — 83690 ASSAY OF LIPASE: CPT | Performed by: EMERGENCY MEDICINE

## 2021-05-18 PROCEDURE — 82075 ASSAY OF BREATH ETHANOL: CPT | Performed by: EMERGENCY MEDICINE

## 2021-05-18 PROCEDURE — 84703 CHORIONIC GONADOTROPIN ASSAY: CPT | Performed by: EMERGENCY MEDICINE

## 2021-05-18 PROCEDURE — 99284 EMERGENCY DEPT VISIT MOD MDM: CPT

## 2021-05-18 PROCEDURE — 80053 COMPREHEN METABOLIC PANEL: CPT | Performed by: EMERGENCY MEDICINE

## 2021-05-18 RX ORDER — LORAZEPAM 0.5 MG/1
0.5 TABLET ORAL ONCE
Status: COMPLETED | OUTPATIENT
Start: 2021-05-18 | End: 2021-05-18

## 2021-05-18 RX ORDER — BUPRENORPHINE AND NALOXONE 2; .5 MG/1; MG/1
4 FILM, SOLUBLE BUCCAL; SUBLINGUAL ONCE
Status: COMPLETED | OUTPATIENT
Start: 2021-05-18 | End: 2021-05-18

## 2021-05-18 RX ORDER — ONDANSETRON 2 MG/ML
4 INJECTION INTRAMUSCULAR; INTRAVENOUS ONCE
Status: COMPLETED | OUTPATIENT
Start: 2021-05-18 | End: 2021-05-18

## 2021-05-18 RX ADMIN — SODIUM CHLORIDE 1000 ML: 0.9 INJECTION, SOLUTION INTRAVENOUS at 12:51

## 2021-05-18 RX ADMIN — LORAZEPAM 0.5 MG: 0.5 TABLET ORAL at 15:14

## 2021-05-18 RX ADMIN — BUPRENORPHINE AND NALOXONE 4 MG: 2; .5 FILM BUCCAL; SUBLINGUAL at 16:00

## 2021-05-18 RX ADMIN — ONDANSETRON 4 MG: 2 INJECTION INTRAMUSCULAR; INTRAVENOUS at 13:00

## 2021-05-18 NOTE — ED ATTENDING ATTESTATION
5/18/2021  IBeverly MD, saw and evaluated the patient  I have discussed the patient with the resident/non-physician practitioner and agree with the resident's/non-physician practitioner's findings, Plan of Care, and MDM as documented in the resident's/non-physician practitioner's note, except where noted  All available labs and Radiology studies were reviewed  I was present for key portions of any procedure(s) performed by the resident/non-physician practitioner and I was immediately available to provide assistance  At this point I agree with the current assessment done in the Emergency Department  I have conducted an independent evaluation of this patient a history and physical is as follows:    Patient presents to the emergency department requesting heroin detox  The patient reports she has used heroin for many years and wants to stop  The patient denies IV drug use of heroin  The patient denies any other recreational drug use  the patient denies alcohol addiction  The patient states last time she used heroin was very early this morning  She typically developed symptoms when she goes more than 5-6 hours without use of heroin  The patient states she is now feeling the symptoms she always gets when she felt use heroin  The patient complains of diffuse abdominal discomfort, nausea, and feeling sweaty  Physical exam demonstrates a female who appears to be slightly anxious  Oral mucosa is moist   Neck was supple nontender  HEENT exam was normal without evidence of craniofacial trauma  Lungs are clear with equal breath sounds  The heart had a regular rate rhythm  The abdomen is soft and nontender with normal bowel sounds  Skin had no rash  There is no focal neurologic deficit  The patient denies SI or HI      ED Course         Critical Care Time  Procedures

## 2021-05-18 NOTE — ED PROVIDER NOTES
History  Chief Complaint   Patient presents with    Detox Evaluation     Pt states she's withdrawing from heroin  States she last used yesterday  C/o nausea, vomiting, and generalized weakness  The patient is a 71-year-old female with a past medical history of bipolar disorder, cocaine abuse, heroin abuse, presents the ED requesting detox  States she last utilized heroin this morning at around 8:00 a m  She states she is withdrawing, endorses nausea, vomiting and generalized abdominal cramping  She is demanding Librium, as she states this helps with her heroin withdrawal   She denies history of alcohol abuse, no ETOH today, denies other drug use, no benzodiazepine abuse  She states her symptoms started today so she called some lady who told me they had a bed for me at the detox center " She does not know this person's name  No fevers  Denies possibility of pregnancy but is unsure when her last period was  History provided by:  Patient   used: No    Detox Evaluation  Similar prior episodes: yes    Onset quality:  Sudden  Timing:  Constant  Progression:  Unchanged  Chronicity:  New  Suspected agents:  Heroin  Associated symptoms: abdominal pain and vomiting    Abdominal pain:     Location:  Generalized      Prior to Admission Medications   Prescriptions Last Dose Informant Patient Reported? Taking?    cloNIDine (CATAPRES) 0 1 mg tablet   No No   Sig: Take 1 tablet (0 1 mg total) by mouth every 12 (twelve) hours   medroxyPROGESTERone (DEPO-PROVERA) 150 mg/mL injection   No No   Sig: Inject 1 mL (150 mg total) into a muscle every 3 (three) months   Patient not taking: Reported on 11/11/2019      Facility-Administered Medications Last Administration Doses Remaining   medroxyPROGESTERone acetate (DEPO-PROVERA SYRINGE) IM injection 150 mg 5/6/2019  4:10 PM           Past Medical History:   Diagnosis Date    Bacterial vaginosis     [facility]     [facility]     Condyloma acuminata     Gastritis     GERD (gastroesophageal reflux disease)     [facility] ([facility] )     Narcotic abuse ([facility] )     Nephrolithiasis     Opioid withdrawal ([facility] )     Suicidal ideations     Vulvitis        Past Surgical History:   Procedure Laterality Date    APPENDECTOMY      HERNIA REPAIR      KNEE SURGERY      URETERAL STENT PLACEMENT         Family History   Problem Relation Age of Onset    Diabetes Maternal Grandmother      I have reviewed and agree with the history as documented  E-Cigarette/Vaping    E-Cigarette Use Never User      E-Cigarette/Vaping Substances    Nicotine No     THC No     CBD No     Flavoring No     Other No     Unknown No      Social History     Tobacco Use    Smoking status: Current Some Day Smoker     Packs/day: 0 30     Years: 10 00     Pack years: 3 00     Types: Cigarettes    Smokeless tobacco: Never Used   Substance Use Topics    Alcohol use: Not Currently     Comment: rarely; Most recently a couple of shots and few beers    Drug use: Not Currently     Types: Heroin, Marijuana     Comment: States only active one is marijuana  Last use heroin / cocaine > 4 months ago        Review of Systems   Constitutional: Negative  Respiratory: Negative  Cardiovascular: Negative  Gastrointestinal: Positive for abdominal pain and vomiting  Genitourinary: Negative for vaginal bleeding  All other systems reviewed and are negative        Physical Exam  ED Triage Vitals   Temperature Pulse Respirations Blood Pressure SpO2   05/18/21 1222 05/18/21 1220 05/18/21 1220 05/18/21 1220 05/18/21 1220   97 9 °F (36 6 °C) 69 16 142/83 100 %      Temp Source Heart Rate Source Patient Position - Orthostatic VS BP Location FiO2 (%)   05/18/21 1222 05/18/21 1222 05/18/21 1222 05/18/21 1222 --   Oral Monitor Lying Right arm       Pain Score       05/18/21 1222       8             Orthostatic Vital Signs  Vitals:    05/18/21 1222 05/18/21 1530 05/18/21 1826 05/18/21 2251   BP: 142/83 140/80 137/71 139/73   Pulse: 59 60 68 62   Patient Position - Orthostatic VS: Lying Lying Sitting Lying       Physical Exam  Vitals signs reviewed  Constitutional:       Appearance: She is well-developed  She is not diaphoretic  Comments: Patient actively vomiting in the room  HENT:      Head: Normocephalic and atraumatic  Right Ear: External ear normal       Left Ear: External ear normal       Nose: Nose normal    Eyes:      General: No scleral icterus  Conjunctiva/sclera: Conjunctivae normal    Neck:      Musculoskeletal: Normal range of motion  Vascular: No JVD  Trachea: No tracheal deviation  Cardiovascular:      Rate and Rhythm: Normal rate and regular rhythm  Heart sounds: Normal heart sounds  No murmur  Pulmonary:      Effort: Pulmonary effort is normal  No respiratory distress  Breath sounds: Normal breath sounds  Abdominal:      General: Abdomen is flat  Bowel sounds are normal  There is no distension  Palpations: Abdomen is soft  Tenderness: There is no abdominal tenderness  There is no guarding  Musculoskeletal: Normal range of motion  Skin:     General: Skin is warm and dry  Capillary Refill: Capillary refill takes less than 2 seconds  Neurological:      Mental Status: She is alert and oriented to person, place, and time  Motor: No abnormal muscle tone     Psychiatric:         Behavior: Behavior normal          ED Medications  Medications   sodium chloride 0 9 % bolus 1,000 mL (0 mL Intravenous Stopped 5/18/21 1531)   ondansetron (ZOFRAN) injection 4 mg (4 mg Intravenous Given 5/18/21 1300)   buprenorphine-naloxone (Suboxone) film 4 mg (4 mg Sublingual Given 5/18/21 1600)   LORazepam (ATIVAN) tablet 0 5 mg (0 5 mg Oral Given 5/18/21 1514)       Diagnostic Studies  Results Reviewed     Procedure Component Value Units Date/Time    Rapid drug screen, urine [[id number]]  (Abnormal) Collected: 05/18/21 1602    Lab Status: Final result Specimen: Urine, Clean Catch Updated: 05/18/21 1639     Amph/Meth UR Negative     Barbiturate Ur Negative     Benzodiazepine Urine Negative     Cocaine Urine Positive     Methadone Urine Negative     Opiate Urine Negative     PCP Ur Negative     THC Urine Positive     Oxycodone Urine Negative    Narrative:      Presumptive report  If requested, specimen will be sent to reference lab for confirmation  FOR MEDICAL PURPOSES ONLY  IF CONFIRMATION NEEDED PLEASE CONTACT THE LAB WITHIN 5 DAYS      Drug Screen Cutoff Levels:  AMPHETAMINE/METHAMPHETAMINES  1000 ng/mL  BARBITURATES     200 ng/mL  BENZODIAZEPINES     200 ng/mL  COCAINE      300 ng/mL  METHADONE      300 ng/mL  OPIATES      300 ng/mL  PHENCYCLIDINE     25 ng/mL  THC       50 ng/mL  OXYCODONE      100 ng/mL    POCT urinalysis dipstick [[id number]]  (Normal) Resulted: 05/18/21 1606    Lab Status: Final result Updated: 05/18/21 1606     Color, UA yellow     Clarity, UA clear    Urine Macroscopic, POC [[id number]]  (Abnormal) Collected: 05/18/21 1603    Lab Status: Final result Specimen: Urine Updated: 05/18/21 1605     Color, UA Yellow     Clarity, UA Clear     pH, UA 8 5     Leukocytes, UA Negative     Nitrite, UA Negative     Protein, UA Negative mg/dl      Glucose, UA Negative mg/dl      Ketones, UA Negative mg/dl      Urobilinogen, UA 0 2 E U /dl      Bilirubin, UA Negative     Blood, UA Negative     Specific Gravity, UA 1 020    Narrative:      CLINITEK RESULT    POCT pregnancy, urine [[id number]]  (Normal) Resulted: 05/18/21 1413    Lab Status: Final result Updated: 05/18/21 1413     EXT PREG TEST UR (Ref: Negative) NEGATIVE     Control V    POCT alcohol breath test [[id number]]  (Normal) Resulted: 05/18/21 1413    Lab Status: Final result Updated: 05/18/21 1413     EXTBreath Alcohol 0 00    hCG, qualitative pregnancy [[id number]]  (Normal) Collected: 05/18/21 1250    Lab Status: Final result Specimen: Blood from Arm, Left Updated: 05/18/21 1322 Preg, Serum Negative    Comprehensive metabolic panel [[id number]] Collected: 05/18/21 1250    Lab Status: Final result Specimen: Blood from Arm, Left Updated: 05/18/21 1321     Sodium 141 mmol/L      Potassium 3 9 mmol/L      Chloride 107 mmol/L      CO2 28 mmol/L      ANION GAP 6 mmol/L      BUN 12 mg/dL      Creatinine 0 94 mg/dL      Glucose 124 mg/dL      Calcium 9 3 mg/dL      AST 21 U/L      ALT 24 U/L      Alkaline Phosphatase 74 U/L      Total Protein 7 1 g/dL      Albumin 3 9 g/dL      Total Bilirubin 0 38 mg/dL      eGFR 81 ml/min/1 73sq m     Narrative:      National Kid[employer] Disease Foundation guidelines for Chronic Kidney Disease (CKD):     Stage 1 with normal or high GFR (GFR > 90 mL/min/1 73 square meters)    Stage 2 Mild CKD (GFR = 60-89 mL/min/1 73 square meters)    Stage 3A Moderate CKD (GFR = 45-59 mL/min/1 73 square meters)    Stage 3B Moderate CKD (GFR = 30-44 mL/min/1 73 square meters)    Stage 4 Severe CKD (GFR = 15-29 mL/min/1 73 square meters)    Stage 5 End Stage CKD (GFR <15 mL/min/1 73 square meters)  Note: GFR calculation is accurate only with a steady state creatinine    Lipase [[id number]]  (Normal) Collected: 05/18/21 1250    Lab Status: Final result Specimen: Blood from Arm, Left Updated: 05/18/21 1321     Lipase 87 u/L     CBC and differential [[id number]]  (Abnormal) Collected: 05/18/21 1250    Lab Status: Final result Specimen: Blood from Arm, Left Updated: 05/18/21 1304     WBC 11 81 Thousand/uL      RBC 4 24 Million/uL      Hemoglobin 13 9 g/dL      Hematocrit 40 3 %      MCV 95 fL      MCH 32 8 pg      MCHC 34 5 g/dL      RDW 11 7 %      MPV 11 4 fL      Platelets 389 Thousands/uL      nRBC 0 /100 WBCs      Neutrophils Relative 84 %      Immat GRANS % 0 %      Lymphocytes Relative 14 %      Monocytes Relative 2 %      Eosinophils Relative 0 %      Basophils Relative 0 %      Neutrophils Absolute 9 78 Thousands/µL      Immature Grans Absolute 0 05 Thousand/uL      Lymphocytes Absolute 1 64 Thousands/µL      Monocytes Absolute 0 27 Thousand/µL      Eosinophils Absolute 0 04 Thousand/µL      Basophils Absolute 0 03 Thousands/µL                  No orders to display         Procedures  Procedures      ED Course                             SBIRT 22yo+      Most Recent Value   SBIRT (22 yo +)   In order to provide better care to our patients, we are screening all of our patients for alcohol and drug use  Would it be okay to ask you these screening questions? No Filed at: 05/18/2021 1531                [facility]  Number of Diagnoses or Management Options  Heroin abuse ([place] [address] ): new and does not require workup  Nausea & vomiting: new and requires workup  Diagnosis management comments: 79-year-old female history of heroin abuse presenting with nausea vomiting, states she is withdrawing and would like host evaluation  Urine pregnancy negative, labs unremarkable  Symptoms controlled with Zofran, patient is requesting benzodiazepine as she states Librium helps my heroin withdrawal " She was given Ativan and Zofran, with resolution of symptoms  Patient evaluated by crisis, pending detox placement        Amount and/or Complexity of Data Reviewed  Clinical lab tests: ordered and reviewed  Review and summarize past medical records: yes        Disposition  Final diagnoses:   Heroin abuse ([place] [address] )   Nausea & vomiting     Time reflects when diagnosis was documented in both MDM as applicable and the Disposition within this note     Time User Action Codes Description Comment    5/18/2021  3:41 PM Marybel Beck Add [F11 10] Heroin abuse ([place] [address] )     5/24/2021  8:12 AM Marbyel Beck Add [R11 2] Nausea & vomiting       ED Disposition     ED Disposition Condition Date/Time Comment    Discharge Stable Tue May 18, 2021 11:30 PM Carlos Patterson discharge to home/self care              Follow-up Information     Follow up With Specialties Details Why Yara Rowland MD Internal Medicine Call  As needed [id number] [address] [facility]  [phone]            Discharge Medication List as of 5/18/2021 11:58 PM      CONTINUE these medications which have NOT CHANGED    Details   cloNIDine (CATAPRES) 0 1 mg tablet Take 1 tablet (0 1 mg total) by mouth every 12 (twelve) hours, Starting Tue 4/21/2020, Normal      medroxyPROGESTERone (DEPO-PROVERA) 150 mg/mL injection Inject 1 mL (150 mg total) into a muscle every 3 (three) months, Starting u 8/30/2018, Normal           No discharge procedures on file  PDMP Review     None           ED Provider  Attending physically available and evaluated [address] Brandee SCHULTZ managed the patient along with the ED Attending      Electronically Signed by         Shmuel Treviño DO  05/24/21 5948

## 2021-05-19 NOTE — ED NOTES
Phone number provided for patient to call [address] [place] [address] BONILLA Fink  05/18/21 1668

## 2021-05-19 NOTE — ED NOTES
Received a call from Rivka Matute at [place] [address] stating patient has been accepted at [facility] with transportation arranged for 5p 05/19/21       SANDY Rodriguez  05/18/21   2013

## 2021-05-19 NOTE — ED NOTES
Per Crisis, Shanel has not received all the information they need from this patient despite the phone number being dialed for her       Antonio [facility], [address] [facility]  05/18/21 9454

## 2021-05-19 NOTE — ED NOTES
Call placed to Kimberly at [place] [address], who confirmed that patient never completed her call with Shanel and assessment was not completed       SANDY Rodriguez  05/18/21   [id number]

## 2021-05-19 NOTE — ED NOTES
Spoke with Kimberly at [place] [address] who states there is a bed on hold until 10p at [facility]  Patient will need to complete assessment with [facility] before a determination is made       SANDY Quevedo  05/18/21

## 2023-04-25 ENCOUNTER — HOSPITAL ENCOUNTER (EMERGENCY)
Facility: HOSPITAL | Age: 33
Discharge: HOME | End: 2023-04-25
Attending: EMERGENCY MEDICINE

## 2023-04-25 VITALS
DIASTOLIC BLOOD PRESSURE: 64 MMHG | RESPIRATION RATE: 18 BRPM | OXYGEN SATURATION: 100 % | HEART RATE: 124 BPM | TEMPERATURE: 98.6 F | SYSTOLIC BLOOD PRESSURE: 139 MMHG

## 2023-04-25 DIAGNOSIS — Z32.01 ENCOUNTER FOR PREGNANCY TEST, RESULT POSITIVE: ICD-10-CM

## 2023-04-25 DIAGNOSIS — Z34.91 FIRST TRIMESTER PREGNANCY: Primary | ICD-10-CM

## 2023-04-25 LAB
EXT PREGNANCY TEST URINE: POSITIVE
EXT. CONTROL: ABNORMAL

## 2023-04-26 NOTE — ED ATTENDING ATTESTATION
2023  IEvelyn DO, saw and evaluated the patient  I have discussed the patient with the resident/non-physician practitioner and agree with the resident's/non-physician practitioner's findings, Plan of Care, and MDM as documented in the resident's/non-physician practitioner's note, except where noted  All available labs and Radiology studies were reviewed  I was present for key portions of any procedure(s) performed by the resident/non-physician practitioner and I was immediately available to provide assistance  At this point I agree with the current assessment done in the Emergency Department  I have conducted an independent evaluation of this patient a history and physical is as follows:    Chief Complaint   Patient presents with   • Pregnancy Test     Patient reports she had a missed period, took an  pregnancy test which was positive on the    Patient reports lmp was beginning of march  Patient states she would like a pregnancy test and possibly an ultrasound for having lower abdominal firmness  78-year-old female presents requesting pregnancy test   Patient states her last menstrual period was the beginning of March  She did take a home test that was positive  Patient denies abdominal pain, no vaginal bleeding or discharge  On exam-no acute distress, heart mildly tachycardic, no respiratory distress  Abdomen is soft and nontender  Plan- check labs, ultrasound and urine        ED Course         Critical Care Time  Procedures

## 2023-04-26 NOTE — DISCHARGE INSTRUCTIONS
Call and schedule an intake appointment with an OB provider  Start taking prenatal vitamins  Return to ER if experience any abdominal pain or vaginal bleeding

## 2023-04-29 NOTE — ED PROVIDER NOTES
History  Chief Complaint   Patient presents with   • Pregnancy Test     Patient reports she had a missed period, took an  pregnancy test which was positive on the    Patient reports lmp was beginning of march  Patient states she would like a pregnancy test and possibly an ultrasound for having lower abdominal firmness  76-year-old female presents for pregnancy test   Patient reports missed period and increasing fullness of her breasts and lower abdomen  Denies abdominal pain, vaginal bleeding, cramping, or any other symptoms  Positive at home pregnancy test   Patient presents today requesting a confirmatory pregnancy test           Prior to Admission Medications   Prescriptions Last Dose Informant Patient Reported? Taking? cloNIDine (CATAPRES) 0 1 mg tablet   No No   Sig: Take 1 tablet (0 1 mg total) by mouth every 12 (twelve) hours   medroxyPROGESTERone (DEPO-PROVERA) 150 mg/mL injection   No No   Sig: Inject 1 mL (150 mg total) into a muscle every 3 (three) months   Patient not taking: Reported on 2019      Facility-Administered Medications Last Administration Doses Remaining   medroxyPROGESTERone acetate (DEPO-PROVERA SYRINGE) IM injection 150 mg 2019  4:10 PM           Past Medical History:   Diagnosis Date   • Bacterial vaginosis    • Bipolar depression ([facility] )    • Cocaine abuse ([facility] )    • Condyloma acuminata    • Gastritis    • GERD (gastroesophageal reflux disease)    • Heroin abuse ([facility] )    • Narcotic abuse ([facility] )    • Nephrolithiasis    • Opioid withdrawal ([facility] )    • Suicidal ideations    • Vulvitis        Past Surgical History:   Procedure Laterality Date   • APPENDECTOMY     • HERNIA REPAIR     • KNEE SURGERY     • URETERAL STENT PLACEMENT         Family History   Problem Relation Age of Onset   • Diabetes Maternal Grandmother      I have reviewed and agree with the history as documented      E-Cigarette/Vaping   • E-Cigarette Use Never User      E-Cigarette/Vaping Substances   • Nicotine No    • THC No    • CBD No    • Flavoring No    • Other No    • Unknown No      Social History     Tobacco Use   • Smoking status: Some Days     Packs/day: 0 30     Years: 10 00     Pack years: 3 00     Types: Cigarettes   • Smokeless tobacco: Never   Vaping Use   • Vaping Use: Never used   Substance Use Topics   • Alcohol use: Not Currently     Comment: rarely; Most recently a couple of shots and few beers   • Drug use: Not Currently     Types: Heroin, Marijuana     Comment: States only active one is marijuana  Last use heroin / cocaine > 4 months ago        Review of Systems   Constitutional: Negative for chills and fever  HENT: Negative for ear pain and sore throat  Eyes: Negative for pain and visual disturbance  Respiratory: Negative for cough and shortness of breath  Cardiovascular: Negative for chest pain and palpitations  Gastrointestinal: Negative for abdominal pain and vomiting  Genitourinary: Negative for dysuria and hematuria  Musculoskeletal: Negative for arthralgias and back pain  Skin: Negative for color change and rash  Neurological: Negative for seizures and syncope  All other systems reviewed and are negative  Physical Exam  ED Triage Vitals [04/25/23 1820]   Temperature Pulse Respirations Blood Pressure SpO2   98 6 °F (37 °C) (!) 124 18 139/64 100 %      Temp Source Heart Rate Source Patient Position - Orthostatic VS BP Location FiO2 (%)   Oral Monitor Sitting Left arm --      Pain Score       No Pain             Orthostatic Vital Signs  Vitals:    04/25/23 1820   BP: 139/64   Pulse: (!) 124   Patient Position - Orthostatic VS: Sitting       Physical Exam  Vitals and nursing note reviewed  Constitutional:       General: She is not in acute distress  Appearance: Normal appearance  She is normal weight  She is not ill-appearing, toxic-appearing or diaphoretic  HENT:      Head: Normocephalic and atraumatic        Right Ear: External ear normal       Left Ear: External ear normal       Nose: Nose normal       Mouth/Throat:      Mouth: Mucous membranes are moist    Eyes:      General:         Right eye: No discharge  Left eye: No discharge  Conjunctiva/sclera: Conjunctivae normal    Cardiovascular:      Rate and Rhythm: Tachycardia present  Pulmonary:      Effort: Pulmonary effort is normal  No respiratory distress  Abdominal:      General: Abdomen is flat  Bowel sounds are normal  There is no distension  Palpations: Abdomen is soft  Tenderness: There is no abdominal tenderness  There is no right CVA tenderness, left CVA tenderness or guarding  Musculoskeletal:         General: Normal range of motion  Skin:     General: Skin is warm and dry  Capillary Refill: Capillary refill takes less than 2 seconds  Coloration: Skin is not pale  Neurological:      Mental Status: She is alert and oriented to person, place, and time     Psychiatric:         Mood and Affect: Mood normal          Behavior: Behavior normal          ED Medications  Medications - No data to display    Diagnostic Studies  Results Reviewed     Procedure Component Value Units Date/Time    POCT pregnancy, urine [[id number]]  (Abnormal) Resulted: 04/25/23 2001    Lab Status: Final result Updated: 04/25/23 2001     EXT Preg Test, Ur Positive     Control Valid                 No orders to display         Procedures  POC Pelvic US    Date/Time: 4/25/2023 6:20 PM  Performed by: Marisol Estrada MD  Authorized by: Marisol Estrada MD     Patient location:  ED  Other Assisting Provider: No    Procedure details:     Exam Type:  Diagnostic    Indications: evaluate for IUP      Assessment for: confirm intrauterine pregnancy and evaluate fetal viability      Technique:  Transabdominal obstetric (HCG+) exam    Views obtained: uterus (transverse and sagittal)      Image quality: diagnostic      Image availability:  Video obtained and still images obtained  Uterine findings:     Intrauterine pregnancy: identified      Single gestation: identified      Multiple gestation: not identified      Gestational sac: identified      Yolk sac: identified      Fetal pole: identified      Fetal heart rate: identified    Other findings:     Free pelvic fluid: not identified    Interpretation:     Ultrasound impressions: normal      Pregnancy findings: intrauterine pregnancy (IUP)            ED Course                                       Medical Decision Making  29-year-old asymptomatic female presents to ED for pregnancy test   Patient reports positive at-home pregnancy test   Denies any symptoms  Plan: Urine pregnancy, bedside pelvic ultrasound    Encounter for pregnancy test, result positive: self-limited or minor problem  First trimester pregnancy: acute illness or injury  Amount and/or Complexity of Data Reviewed  Labs: ordered  Disposition  Final diagnoses:   Encounter for pregnancy test, result positive   First trimester pregnancy     Time reflects when diagnosis was documented in both MDM as applicable and the Disposition within this note     Time User Action Codes Description Comment    4/25/2023  8:34 PM Evalee Hals Add [Z32 01] Encounter for pregnancy test, result positive     4/29/2023  3:59 AM Evalee Hals Add [Z34 91] First trimester pregnancy     4/29/2023  3:59 AM Evalee Hals Modify [Z32 01] Encounter for pregnancy test, result positive     4/29/2023  3:59 AM Evalee Hals Modify [O23 65] First trimester pregnancy       ED Disposition     ED Disposition   Discharge    Condition   Stable    Date/Time   Tue Apr 25, 2023  8:34 PM    [address] [place] [address] discharge to home/self care                 Follow-up Information     Follow up With Specialties Details Why Contact Info Additional Information    [facility] EMERGENCY [facility] and Gynecology   [address] [facility] [address] [facility] [address] [facility], [address] [facility], [place], [facility] [address] and Gynecology   [address] [facility] [address] [facility] [address] [phone] [facility] [place], [address] [facility], [address]          Discharge Medication List as of 4/25/2023  8:36 PM      CONTINUE these medications which have NOT CHANGED    Details   cloNIDine (CATAPRES) 0 1 mg tablet Take 1 tablet (0 1 mg total) by mouth every 12 (twelve) hours, Starting Tue 4/21/2020, Normal      medroxyPROGESTERone (DEPO-PROVERA) 150 mg/mL injection Inject 1 mL (150 mg total) into a muscle every 3 (three) months, Starting Thu 8/30/2018, Normal           No discharge procedures on file  PDMP Review     None           ED Provider  Attending physically available and evaluated [address] Brand[address] SCHULTZ managed the patient along with the ED Attending      Electronically Signed by         Kirk Hammer MD  04/29/23 3396

## 2023-05-08 ENCOUNTER — ULTRASOUND (OUTPATIENT)
Dept: OBGYN CLINIC | Facility: CLINIC | Age: 33
End: 2023-05-08

## 2023-05-08 DIAGNOSIS — O36.80X1 ENCOUNTER TO DETERMINE FETAL VIABILITY OF PREGNANCY, FETUS 1: Primary | ICD-10-CM

## 2023-05-08 NOTE — PROGRESS NOTES
SV HZW=319 BPM  CRL=20 2 mm=8w4d  EDC=12/14/2023    UT=98 x 77 x 78 mm  RT OV=26 x 23 x 22 mm  LT OV=26 x 25 x 20 mm

## 2023-05-24 ENCOUNTER — INITIAL PRENATAL (OUTPATIENT)
Dept: OBGYN CLINIC | Facility: CLINIC | Age: 33
End: 2023-05-24

## 2023-05-24 VITALS — BODY MASS INDEX: 24.96 KG/M2 | HEIGHT: 65 IN

## 2023-05-24 DIAGNOSIS — Z34.81 PRENATAL CARE, SUBSEQUENT PREGNANCY, FIRST TRIMESTER: ICD-10-CM

## 2023-05-24 DIAGNOSIS — Z3A.10 10 WEEKS GESTATION OF PREGNANCY: ICD-10-CM

## 2023-05-24 DIAGNOSIS — Z36.89 ENCOUNTER FOR OTHER SPECIFIED ANTENATAL SCREENING: Primary | ICD-10-CM

## 2023-05-24 NOTE — PROGRESS NOTES
INITIAL PRENATAL NURSE APPT:     LMP 3/13/2023 - [facility] 2023  U/S 2023 - 8 4/ wk - [facility] 2023  Not using any birth control, no actively attempting pregnancy - prev on Depo Provera  FOB involved(1st preg for FOB) - pt lives with her parents  recom q 6 month dental cleanings - last cleaning 7 months ago  No hx Covid  No prev Covid vaccine - recom per CDC guidelines - pt declines  Initial prenatal labs ordered ([facility])  Discussed CF carrier screening - pt will verify if covereed by insur & if wants to have done  Reviewed SQS testing (recom for pt to schedule appt with MFM, Level 2 U/S @ 18-20 wk, TDAp @ 28 wks  Hx Bipolar, schizophrenia,depression  Hx opiate, heroin abuse - no opiate use x 3-4 yrs per pt  Hx suicidal ideation & attempt while in [facility] 10/2022  Initial prenatal labs ordered ([facility])  Taking prenatal vits  Smoker - decreased to 3 cig/day presently  Info on [facility] given  Pt aware of delivery @ [address] [facility]

## 2023-05-25 ENCOUNTER — TELEPHONE (OUTPATIENT)
Facility: HOSPITAL | Age: 33
End: 2023-05-25

## 2023-05-25 NOTE — TELEPHONE ENCOUNTER
JULIOCESARM for pt to schedule NT scan for Saturday 5/27  Advised pt to contact our office to confirm an appointment for first trimester screening  Phone number provided

## 2023-05-26 NOTE — PROGRESS NOTES
Please refer to the [facility] ultrasound report in Ob Procedures for additional information regarding today's visit

## 2023-05-27 ENCOUNTER — ROUTINE PRENATAL (OUTPATIENT)
Facility: HOSPITAL | Age: 33
End: 2023-05-27

## 2023-05-27 VITALS
WEIGHT: 177.6 LBS | HEIGHT: 65 IN | HEART RATE: 89 BPM | BODY MASS INDEX: 29.59 KG/M2 | DIASTOLIC BLOOD PRESSURE: 52 MMHG | SYSTOLIC BLOOD PRESSURE: 98 MMHG

## 2023-05-27 DIAGNOSIS — O99.331 TOBACCO SMOKING AFFECTING PREGNANCY IN FIRST TRIMESTER: ICD-10-CM

## 2023-05-27 DIAGNOSIS — F11.11 HISTORY OF OPIOID ABUSE (HCC): ICD-10-CM

## 2023-05-27 DIAGNOSIS — Z3A.11 11 WEEKS GESTATION OF PREGNANCY: ICD-10-CM

## 2023-05-27 DIAGNOSIS — Z36.82 ENCOUNTER FOR ANTENATAL SCREENING FOR NUCHAL TRANSLUCENCY: Primary | ICD-10-CM

## 2023-05-27 DIAGNOSIS — Z3A.10 10 WEEKS GESTATION OF PREGNANCY: ICD-10-CM

## 2023-05-27 NOTE — PROGRESS NOTES
"  Patient chose to have [facility] Non-invasive Prenatal Screen with fetal sex  Patient given brochure and is aware [facility] will contact patients insurance and coordinate coverage  Patient made aware she will need to respond to text message or e-mail from [employer] within 2 business days or testing will be run through insurance  Patient informed text message will come from area code  \"796\"  Provided [address] [place] [address] # [phone] and web site : [email or web address]  Blood collection tubes labeled with patient identifiers (name, date of birth)    printed [facility] lab order and test kit given to patient to take to [facility] outpatient lab for blood collection  Copy of lab order scanned to Epic media  Maternal Fetal Medicine will have results in approximately 7-10 business days and will call patient or notify via [address]  Patient aware viewing lab result online will reveal fetal sex If ordered  Patient verbalized understanding of all instructions and no questions at this time  Chioma Mckeon   "

## 2023-05-27 NOTE — LETTER
May 27, 2023     Mariana De La Vega, [address] [facility]  [address] [address]    Patient: Alice Benavides   YOB: 1990   Date of Visit: 5/27/2023       Dear Dr Arsh Lacy: Thank you for referring Francisco Javier Stewart to me for evaluation  Below are my notes for this consultation  If you have questions, please do not hesitate to call me  I look forward to following your patient along with you           Sincerely,        Olivia Obregon MD        CC: No Recipients    Olivia Obregon MD  5/26/2023  7:09 AM  Sign when Signing Visit  Please refer to the [facility] ultrasound report in Ob Procedures for additional information regarding today's visit

## 2023-05-31 ENCOUNTER — APPOINTMENT (OUTPATIENT)
Dept: LAB | Facility: CLINIC | Age: 33
End: 2023-05-31
Payer: COMMERCIAL

## 2023-05-31 DIAGNOSIS — Z36.89 ENCOUNTER FOR OTHER SPECIFIED ANTENATAL SCREENING: ICD-10-CM

## 2023-05-31 DIAGNOSIS — O09.211 SUPERVISION OF PREGNANCY WITH HISTORY OF PRE-TERM LABOR IN FIRST TRIMESTER: ICD-10-CM

## 2023-05-31 LAB
ABO GROUP BLD: NORMAL
BACTERIA UR QL AUTO: NORMAL /HPF
BASOPHILS # BLD AUTO: 0.04 THOUSANDS/ÂΜL (ref 0–0.1)
BASOPHILS NFR BLD AUTO: 0 % (ref 0–1)
BILIRUB UR QL STRIP: NEGATIVE
BLD GP AB SCN SERPL QL: NEGATIVE
CLARITY UR: CLEAR
COLOR UR: COLORLESS
EOSINOPHIL # BLD AUTO: 0.09 THOUSAND/ÂΜL (ref 0–0.61)
EOSINOPHIL NFR BLD AUTO: 1 % (ref 0–6)
ERYTHROCYTE [DISTWIDTH] IN BLOOD BY AUTOMATED COUNT: 12.6 % (ref 11.6–15.1)
GLUCOSE UR STRIP-MCNC: NEGATIVE MG/DL
HBV SURFACE AG SER QL: NORMAL
HCT VFR BLD AUTO: 35.4 % (ref 34.8–46.1)
HGB BLD-MCNC: 11.9 G/DL (ref 11.5–15.4)
HGB UR QL STRIP.AUTO: NEGATIVE
HIV 1+2 AB+HIV1 P24 AG SERPL QL IA: NORMAL
HIV 2 AB SERPL QL IA: NORMAL
HIV1 AB SERPL QL IA: NORMAL
HIV1 P24 AG SERPL QL IA: NORMAL
IMM GRANULOCYTES # BLD AUTO: 0.05 THOUSAND/UL (ref 0–0.2)
IMM GRANULOCYTES NFR BLD AUTO: 0 % (ref 0–2)
KETONES UR STRIP-MCNC: NEGATIVE MG/DL
LEUKOCYTE ESTERASE UR QL STRIP: NEGATIVE
LYMPHOCYTES # BLD AUTO: 2.08 THOUSANDS/ÂΜL (ref 0.6–4.47)
LYMPHOCYTES NFR BLD AUTO: 18 % (ref 14–44)
MCH RBC QN AUTO: 32.9 PG (ref 26.8–34.3)
MCHC RBC AUTO-ENTMCNC: 33.6 G/DL (ref 31.4–37.4)
MCV RBC AUTO: 98 FL (ref 82–98)
MONOCYTES # BLD AUTO: 0.37 THOUSAND/ÂΜL (ref 0.17–1.22)
MONOCYTES NFR BLD AUTO: 3 % (ref 4–12)
NEUTROPHILS # BLD AUTO: 8.76 THOUSANDS/ÂΜL (ref 1.85–7.62)
NEUTS SEG NFR BLD AUTO: 78 % (ref 43–75)
NITRITE UR QL STRIP: NEGATIVE
NON-SQ EPI CELLS URNS QL MICRO: NORMAL /HPF
NRBC BLD AUTO-RTO: 0 /100 WBCS
PH UR STRIP.AUTO: 6.5 [PH]
PLATELET # BLD AUTO: 150 THOUSANDS/UL (ref 149–390)
PMV BLD AUTO: 13.6 FL (ref 8.9–12.7)
PROT UR STRIP-MCNC: NEGATIVE MG/DL
RBC # BLD AUTO: 3.62 MILLION/UL (ref 3.81–5.12)
RBC #/AREA URNS AUTO: NORMAL /HPF
RH BLD: POSITIVE
RUBV IGG SERPL IA-ACNC: <10 IU/ML
SP GR UR STRIP.AUTO: 1.01 (ref 1–1.03)
SPECIMEN EXPIRATION DATE: NORMAL
TREPONEMA PALLIDUM IGG+IGM AB [PRESENCE] IN SERUM OR PLASMA BY IMMUNOASSAY: NORMAL
UROBILINOGEN UR STRIP-ACNC: <2 MG/DL
WBC # BLD AUTO: 11.39 THOUSAND/UL (ref 4.31–10.16)
WBC #/AREA URNS AUTO: NORMAL /HPF

## 2023-05-31 PROCEDURE — 86900 BLOOD TYPING SEROLOGIC ABO: CPT

## 2023-05-31 PROCEDURE — 81001 URINALYSIS AUTO W/SCOPE: CPT

## 2023-05-31 PROCEDURE — 87340 HEPATITIS B SURFACE AG IA: CPT

## 2023-05-31 PROCEDURE — 36415 COLL VENOUS BLD VENIPUNCTURE: CPT

## 2023-05-31 PROCEDURE — 86780 TREPONEMA PALLIDUM: CPT

## 2023-05-31 PROCEDURE — 86901 BLOOD TYPING SEROLOGIC RH(D): CPT

## 2023-05-31 PROCEDURE — 87389 HIV-1 AG W/HIV-1&-2 AB AG IA: CPT

## 2023-05-31 PROCEDURE — 86762 RUBELLA ANTIBODY: CPT

## 2023-05-31 PROCEDURE — 85025 COMPLETE CBC W/AUTO DIFF WBC: CPT

## 2023-05-31 PROCEDURE — 87086 URINE CULTURE/COLONY COUNT: CPT

## 2023-05-31 PROCEDURE — 86850 RBC ANTIBODY SCREEN: CPT

## 2023-06-01 LAB — BACTERIA UR CULT: NORMAL

## 2023-06-06 ENCOUNTER — ROUTINE PRENATAL (OUTPATIENT)
Dept: OBGYN CLINIC | Facility: CLINIC | Age: 33
End: 2023-06-06
Payer: COMMERCIAL

## 2023-06-06 VITALS
SYSTOLIC BLOOD PRESSURE: 126 MMHG | WEIGHT: 180.6 LBS | BODY MASS INDEX: 30.09 KG/M2 | HEIGHT: 65 IN | DIASTOLIC BLOOD PRESSURE: 78 MMHG

## 2023-06-06 DIAGNOSIS — Z36.9 ANTENATAL SCREENING ENCOUNTER: ICD-10-CM

## 2023-06-06 DIAGNOSIS — Z34.81 PRENATAL CARE, SUBSEQUENT PREGNANCY, FIRST TRIMESTER: Primary | ICD-10-CM

## 2023-06-06 DIAGNOSIS — Z3A.12 12 WEEKS GESTATION OF PREGNANCY: ICD-10-CM

## 2023-06-06 PROCEDURE — 87491 CHLMYD TRACH DNA AMP PROBE: CPT | Performed by: OBSTETRICS & GYNECOLOGY

## 2023-06-06 PROCEDURE — 87591 N.GONORRHOEAE DNA AMP PROB: CPT | Performed by: OBSTETRICS & GYNECOLOGY

## 2023-06-06 PROCEDURE — G0476 HPV COMBO ASSAY CA SCREEN: HCPCS | Performed by: OBSTETRICS & GYNECOLOGY

## 2023-06-06 PROCEDURE — G0145 SCR C/V CYTO,THINLAYER,RESCR: HCPCS | Performed by: OBSTETRICS & GYNECOLOGY

## 2023-06-06 PROCEDURE — 99213 OFFICE O/P EST LOW 20 MIN: CPT | Performed by: OBSTETRICS & GYNECOLOGY

## 2023-06-06 NOTE — PROGRESS NOTES
Assessment     Pregnancy at 12 and 5/7 weeks      Plan     Initial labs drawn  Prenatal vitamins  Problem list reviewed and updated  AFP3 discussed: undecided  Role of ultrasound in pregnancy discussed; fetal survey: undecided  Amniocentesis discussed: declined  Follow up in 4 weeks  50% of 20 min visit spent on counseling and coordination of care  Chlamydia and Gonorrhea PCR ordered for patient  Pap smear completed in office today  [facility] Ezra Gutierrez is being seen today for her first obstetrical visit  This is not a planned pregnancy  She is at 12w5d gestation  Her obstetrical history is significant for smoker and histroy of opioid use  Relationship with FOB: significant other, not living together  Patient does intend to breast feed  Pregnancy history fully reviewed  Patient states that she feels well overall and has been asymptomatic up tot his point  She denies bleeding, cramping, discharge, abdominal pain, flank pain, nausea, vomiting, chest pain, and shortness of breath  Menstrual History:  OB History        2    Para   1    Term   1       0    AB   0    Living   1       SAB   0    IAB   0    Ectopic   0    Multiple   0    Live Births   1                Patient's last menstrual period was 2023         The following portions of the patient's history were reviewed and updated as appropriate: allergies, current medications, past family history, past medical history, past social history, past surgical history and problem list     Review of Systems  Constitutional: negative  Eyes: negative  Ears, nose, mouth, throat, and face: negative  Respiratory: negative  Cardiovascular: negative  Gastrointestinal: negative  Genitourinary:negative  Integument/breast: negative  Hematologic/lymphatic: negative  Musculoskeletal:negative  Neurological: negative  Behavioral/Psych: negative  Endocrine: negative  Allergic/Immunologic: negative      Objective     /78   Ht 5' "5\" (1 651 m)   Wt 81 9 kg (180 lb 9 6 oz)   LMP 03/13/2023   BMI 30 05 kg/m²     General Appearance:    Alert, cooperative, no distress, appears stated age   Head:    Normocephalic, without obvious abnormality, atraumatic   Eyes:    PERRL, conjunctiva/corneas clear, EOM's intact, fundi     benign, both eyes   Ears:    Normal TM's and external ear canals, both ears   Nose:   Nares normal, septum midline, mucosa normal, no drainage    or sinus tenderness   Throat:   Lips, mucosa, and tongue normal; teeth and gums normal   Neck:   Supple, symmetrical, trachea midline, no adenopathy;     thyroid:  no enlargement/tenderness/nodules; no carotid    bruit or JVD   Back:     Symmetric, no curvature, ROM normal, no CVA tenderness   Lungs:     Clear to auscultation bilaterally, respirations unlabored   Chest Wall:    No tenderness or deformity    Heart:    Regular rate and rhythm, S1 and S2 normal, no murmur, rub   or gallop   Breast Exam:    No tenderness, masses, or nipple abnormality   Abdomen:     Soft, non-tender, bowel sounds active all four quadrants,     no masses, no organomegaly   Genitalia:    Normal female without lesion, discharge or tenderness   Rectal:    Normal tone, no masses or tenderness; guaiac negative stool   Extremities:   Extremities normal, atraumatic, no cyanosis or edema   Pulses:   2+ and symmetric all extremities   Skin:   Skin color, texture, turgor normal, no rashes or lesions   Lymph nodes:   Cervical, supraclavicular, and axillary nodes normal   Neurologic:   CNII-XII intact, normal strength, sensation and reflexes     throughout         "

## 2023-06-06 NOTE — PATIENT INSTRUCTIONS
Pregnancy at 11 to 14 Weeks   AMBULATORY CARE:   Changes happening to your body: You are now at the end of your first trimester and entering your second trimester  Morning sickness usually goes away by this time  You may have other symptoms such as fatigue, frequent urination, and headaches  You may have gained 2 to 4 pounds by now  Seek care immediately if:   You have pain or cramping in your abdomen or low back  You have heavy vaginal bleeding or clotting  You pass material that looks like tissue or large clots  Collect the material and bring it with you  Call your doctor or obstetrician if:   You cannot keep food or drinks down, and you are losing weight  You have light vaginal bleeding  You have chills or a fever  You have vaginal itching, burning, or pain  You have yellow, green, white, or foul-smelling vaginal discharge  You have pain or burning when you urinate, less urine than usual, or pink or bloody urine  You have questions or concerns about your condition or care  How to care for yourself at this stage of your pregnancy:       Get plenty of rest   You may feel more tired than normal  You may need to take naps or go to bed earlier  Manage nausea and vomiting  Avoid fatty and spicy foods  Eat small meals throughout the day instead of large meals  Sherley may help to decrease nausea  Ask your healthcare provider about other ways of decreasing nausea and vomiting  Eat a variety of healthy foods  Healthy foods include fruits, vegetables, whole-grain breads, low-fat dairy foods, beans, lean meats, and fish  Drink liquids as directed  Ask how much liquid to drink each day and which liquids are best for you  Limit caffeine to less than 200 milligrams each day  Limit your intake of fish to 2 servings each week  Choose fish low in mercury such as canned light tuna, shrimp, salmon, cod, or tilapia   Do not  eat fish high in mercury such as swordfish, tilefish, jaylan mackerel, and shark  Take prenatal vitamins as directed  Your need for certain vitamins and minerals, such as folic acid, increases during pregnancy  Prenatal vitamins provide some of the extra vitamins and minerals you need  Prenatal vitamins may also help to decrease the risk of certain birth defects  Do not smoke  Smoking increases your risk of a miscarriage and other health problems during your pregnancy  Smoking can cause your baby to be born too early or weigh less at birth  Ask your healthcare provider for information if you need help quitting  Do not drink alcohol  Alcohol passes from your body to your baby through the placenta  It can affect your baby's brain development and cause fetal alcohol syndrome (FAS)  FAS is a group of conditions that causes mental, behavior, and growth problems  Talk to your healthcare provider before you take any medicines  Many medicines may harm your baby if you take them when you are pregnant  Do not take any medicines, vitamins, herbs, or supplements without first talking to your healthcare provider  Never use illegal or street drugs (such as marijuana or cocaine) while you are pregnant  Safety tips during pregnancy:   Avoid hot tubs and saunas  Do not use a hot tub or sauna while you are pregnant, especially during your first trimester  Hot tubs and saunas may raise your baby's temperature and increase the risk of birth defects  Avoid toxoplasmosis  This is an infection caused by eating raw meat or being around infected cat feces  It can cause birth defects, miscarriages, and other problems  Wash your hands after you touch raw meat  Make sure any meat is well-cooked before you eat it  Avoid raw eggs and unpasteurized milk  Use gloves or ask someone else to clean your cat's litter box while you are pregnant  Changes happening with your baby: Your baby has fully formed fingernails and toenails  Your baby's heartbeat can now be heard   Ask your healthcare provider if you can listen to your baby's heartbeat  By week 14, your baby is over 4 inches long from the top of the head to the rump (baby's bottom)  Your baby weighs over 3 ounces  Prenatal care:  Prenatal care is a series of visits with your healthcare provider throughout your pregnancy  During the first 28 weeks of your pregnancy, you will see your healthcare provider 1 time each month  Prenatal care can help prevent problems during pregnancy and childbirth  Your healthcare provider will check your blood pressure and weight  Your baby's heart rate will also be checked  You may also need the following at some visits:  A pelvic exam  allows your healthcare provider to see your cervix (the bottom part of your uterus)  Your healthcare provider will use a speculum to open your vagina  He or she will check the size and shape of your uterus  Blood tests  may be done to check for any of the following:    Gestational diabetes or anemia (low iron level)    Blood type or Rh factor, or certain birth defects    Immunity to certain diseases, such as chickenpox or rubella    An infection, such as a sexually transmitted infection, HIV, or hepatitis B    Hepatitis B  may need to be prevented or treated  Hepatitis B is inflammation of the liver caused by the hepatitis B virus (HBV)  HBV can spread from a mother to her baby during delivery  You will be checked for HBV as early as possible in the first trimester of each pregnancy  You need the test even if you received the hepatitis B vaccine or were tested before  You may need to have an HBV infection treated before you give birth  Urine tests  may also be done to check for sugar and protein  These can be signs of gestational diabetes or preeclampsia  Urine tests may also be done to check for signs of infection  A fetal ultrasound  shows pictures of your baby inside your uterus  The pictures are used to check your baby's development, movement, and position  Genetic disorder screening tests  may be offered to you  These tests check your baby's risk for genetic disorders such as Down syndrome  A screening test includes a blood test and ultrasound  Follow up with your doctor or obstetrician as directed:  Go to all prenatal visits  Write down your questions so you remember to ask them during your visits  © [employer] 2022 Information is for End User's use only and may not be sold, redistributed or otherwise used for commercial purposes  The above information is an  only  It is not intended as medical advice for individual conditions or treatments  Talk to your doctor, nurse or pharmacist before following any medical regimen to see if it is safe and effective for you

## 2023-06-08 LAB
C TRACH DNA SPEC QL NAA+PROBE: NEGATIVE
HPV HR 12 DNA CVX QL NAA+PROBE: NEGATIVE
HPV16 DNA CVX QL NAA+PROBE: NEGATIVE
HPV18 DNA CVX QL NAA+PROBE: NEGATIVE
N GONORRHOEA DNA SPEC QL NAA+PROBE: NEGATIVE

## 2023-06-13 LAB
LAB AP GYN PRIMARY INTERPRETATION: NORMAL
Lab: NORMAL
PATH INTERP SPEC-IMP: NORMAL

## 2023-06-21 DIAGNOSIS — N76.0 BV (BACTERIAL VAGINOSIS): Primary | ICD-10-CM

## 2023-06-21 DIAGNOSIS — B96.89 BV (BACTERIAL VAGINOSIS): Primary | ICD-10-CM

## 2023-06-21 RX ORDER — METRONIDAZOLE 500 MG/1
500 TABLET ORAL EVERY 12 HOURS SCHEDULED
Qty: 14 TABLET | Refills: 0 | Status: SHIPPED | OUTPATIENT
Start: 2023-06-21 | End: 2023-06-28

## 2023-06-21 NOTE — TELEPHONE ENCOUNTER
Patient returned call, went over pap smear results and planned medication  Patient aware and understanding of results and meds, script ready to be filled to [facility] pharmacy on [address]

## 2023-06-21 NOTE — TELEPHONE ENCOUNTER
----- Message from Abby Guerrero MD sent at 6/20/2023 12:31 PM EDT -----  Pap smear was negative but showed signs of a BV infection    Please treat with Flagyl 500 mg dispense 14 tablets take 1 tablet twice daily for 7 days    Thanks

## 2023-06-29 LAB — RUBV IGG SERPL IA-ACNC: <10 IU/ML

## 2023-07-03 ENCOUNTER — ROUTINE PRENATAL (OUTPATIENT)
Dept: OBGYN CLINIC | Facility: CLINIC | Age: 33
End: 2023-07-03
Payer: COMMERCIAL

## 2023-07-03 VITALS
WEIGHT: 175.8 LBS | DIASTOLIC BLOOD PRESSURE: 68 MMHG | BODY MASS INDEX: 29.29 KG/M2 | HEIGHT: 65 IN | SYSTOLIC BLOOD PRESSURE: 124 MMHG

## 2023-07-03 DIAGNOSIS — Z3A.16 16 WEEKS GESTATION OF PREGNANCY: ICD-10-CM

## 2023-07-03 DIAGNOSIS — Z34.82 PRENATAL CARE, SUBSEQUENT PREGNANCY, SECOND TRIMESTER: Primary | ICD-10-CM

## 2023-07-03 PROCEDURE — 99213 OFFICE O/P EST LOW 20 MIN: CPT | Performed by: OBSTETRICS & GYNECOLOGY

## 2023-07-03 NOTE — PROGRESS NOTES
Assessment      Pregnancy 16 and 4/7 weeks     Plan     Problem list reviewed and updated. Labs reviewed. Genetic screening tests discussed: requested. Role of ultrasound in pregnancy discussed; fetal survey: requested. Amniocentesis discussed: not indicated. Follow up in 4 weeks. 50% of 20 minute visit spent on counseling and coordination of care. .          Franklyn Jules is a 28 y.o. female being seen today for her obstetrical visit. She is at 16w4d gestation. Patient reports backache, fatigue, no bleeding, no cramping and no leaking. Fetal movement: normal.    Menstrual History:  OB History        2    Para   1    Term   1       0    AB   0    Living   1       SAB   0    IAB   0    Ectopic   0    Multiple   0    Live Births   1                Menarche age:   Patient's last menstrual period was 2023. The following portions of the patient's history were reviewed and updated as appropriate: allergies, current medications, past family history, past medical history, past social history, past surgical history and problem list.    Review of Systems  Pertinent items are noted in HPI. Objective     /68   Ht 5' 5" (1.651 m)   Wt 79.7 kg (175 lb 12.8 oz)   LMP 2023   BMI 29.25 kg/m²   Uterine Size: U-3   Pelvic Exam:           Perineum:                  Vulva:                Vagina:                        pH:                Cervix:            Wet Prep:                Uterus:               Adnexa:        Bony Pelvis:

## 2023-07-03 NOTE — PATIENT INSTRUCTIONS
Pregnancy at 15 to 18 Weeks   AMBULATORY CARE:   What changes are happening to your body:  Now that you are in your second trimester, you have more energy. You may also feel hungrier than usual. You may start to experience other symptoms, such as heartburn or dizziness. You may be gaining about ½ to 1 pound a week, and your pregnancy is beginning to show. You may need to start wearing maternity clothes. Seek care immediately if:   You have pain or cramping in your abdomen or low back. You have heavy vaginal bleeding or clotting. You pass material that looks like tissue or large clots. Collect the material and bring it with you. Call your doctor or obstetrician if:   You cannot keep food or drinks down, and you are losing weight. You have light bleeding. You have chills or a fever. You have vaginal itching, burning, or pain. You have yellow, green, white, or foul-smelling vaginal discharge. You have pain or burning when you urinate, less urine than usual, or pink or bloody urine. You have questions or concerns about your condition or care. How to care for yourself at this stage of your pregnancy:       Manage heartburn  by eating 4 or 5 small meals each day instead of large meals. Avoid spicy foods. Avoid eating right before bedtime. Manage nausea and vomiting. Avoid fatty and spicy foods. Eat small meals throughout the day instead of large meals. Sherley may help to decrease nausea. Ask your healthcare provider about other ways of decreasing nausea and vomiting. Eat a variety of healthy foods. Healthy foods include fruits, vegetables, whole-grain breads, low-fat dairy foods, beans, lean meats, and fish. Drink liquids as directed. Ask how much liquid to drink each day and which liquids are best for you. Limit caffeine to less than 200 milligrams each day. Limit your intake of fish to 2 servings each week.  Choose fish low in mercury such as canned light tuna, shrimp, salmon, cod, or tilapia. Do not  eat fish high in mercury such as swordfish, tilefish, jaylan mackerel, and shark. Take prenatal vitamins as directed. Your need for certain vitamins and minerals, such as folic acid, increases during pregnancy. Prenatal vitamins provide some of the extra vitamins and minerals you need. Prenatal vitamins may also help to decrease the risk of certain birth defects. Do not smoke. Smoking increases your risk of a miscarriage and other health problems during your pregnancy. Smoking can cause your baby to be born too early or weigh less at birth. Ask your healthcare provider for information if you need help quitting. Do not drink alcohol. Alcohol passes from your body to your baby through the placenta. It can affect your baby's brain development and cause fetal alcohol syndrome (FAS). FAS is a group of conditions that causes mental, behavior, and growth problems. Talk to your healthcare provider before you take any medicines. Many medicines may harm your baby if you take them when you are pregnant. Do not take any medicines, vitamins, herbs, or supplements without first talking to your healthcare provider. Never use illegal or street drugs (such as marijuana or cocaine) while you are pregnant. Safety tips during pregnancy:   Avoid hot tubs and saunas. Do not use a hot tub or sauna while you are pregnant, especially during your first trimester. Hot tubs and saunas may raise your baby's temperature and increase the risk of birth defects. Avoid toxoplasmosis. This is an infection caused by eating raw meat or being around infected cat feces. It can cause birth defects, miscarriages, and other problems. Wash your hands after you touch raw meat. Make sure any meat is well-cooked before you eat it. Avoid raw eggs and unpasteurized milk. Use gloves or ask someone else to clean your cat's litter box while you are pregnant.     Changes that are happening with your baby:  By 24 weeks, your baby may be about 6 inches long from the top of the head to the rump (baby's bottom). Your baby may weigh about 11 ounces. You may be able to feel your baby's movement at about 18 weeks or later. The first movements may not be that noticeable. They may feel like a fluttering sensation. Your baby also makes sucking movements and can hear certain sounds. What you need to know about prenatal care:  During the first 28 weeks of your pregnancy, you will see your healthcare provider once a month. Your healthcare provider will check your blood pressure and weight. You may also need any of the following:  A urine test  may also be done to check for sugar and protein. These can be signs of gestational diabetes or infection. A blood test  may be done to check for anemia (low iron level). Fundal height check  is a measurement of your uterus to check your baby's growth. This number is usually the same as the number of weeks that you have been pregnant. An ultrasound  may be done to check your baby's development. Your healthcare provider may be able to tell you what your baby's gender is during the ultrasound. Your baby's heart rate  will be checked. © Copyright Gus Peralta 2022 Information is for End User's use only and may not be sold, redistributed or otherwise used for commercial purposes. The above information is an  only. It is not intended as medical advice for individual conditions or treatments. Talk to your doctor, nurse or pharmacist before following any medical regimen to see if it is safe and effective for you.

## 2023-08-07 ENCOUNTER — ROUTINE PRENATAL (OUTPATIENT)
Dept: PERINATAL CARE | Facility: CLINIC | Age: 33
End: 2023-08-07
Payer: COMMERCIAL

## 2023-08-07 VITALS
DIASTOLIC BLOOD PRESSURE: 65 MMHG | BODY MASS INDEX: 32.96 KG/M2 | WEIGHT: 197.8 LBS | HEART RATE: 73 BPM | HEIGHT: 65 IN | SYSTOLIC BLOOD PRESSURE: 110 MMHG

## 2023-08-07 DIAGNOSIS — Z36.3 ENCOUNTER FOR ANTENATAL SCREENING FOR MALFORMATIONS: Primary | ICD-10-CM

## 2023-08-07 DIAGNOSIS — Z36.86 ENCOUNTER FOR ANTENATAL SCREENING FOR CERVICAL LENGTH: ICD-10-CM

## 2023-08-07 PROCEDURE — 76805 OB US >/= 14 WKS SNGL FETUS: CPT | Performed by: OBSTETRICS & GYNECOLOGY

## 2023-08-07 PROCEDURE — 76817 TRANSVAGINAL US OBSTETRIC: CPT | Performed by: OBSTETRICS & GYNECOLOGY

## 2023-08-07 NOTE — PROGRESS NOTES
Ultrasound Probe Disinfection    A transvaginal ultrasound was performed. Prior to use, disinfection was performed with High Level Disinfection Process ([employer]ira[employer]). Probe serial number [id number]: [id number] was used.       Dion Goodpasture  08/07/23  1:48 PM

## 2023-08-07 NOTE — PATIENT INSTRUCTIONS
Thank you for choosing us for your  care today. If you have any questions about your ultrasound or care, please do not hesitate to contact us or your primary obstetrician. Some general instructions for your pregnancy are:    Exercise: Aim for 22 minutes per day (150 minutes per week) of regular exercise. Walking is great! Nutrition: Choose healthy sources of calcium, iron, and protein. Learn about Preeclampsia: preeclampsia is a common, serious high blood pressure complication in pregnancy. A blood pressure of 939RAMB (systolic or top number) or 42OVHR (diastolic or bottom number) is not normal and needs evaluation by your doctor. Aspirin is sometimes prescribed in early pregnancy to prevent preeclampsia in women with risk factors - ask your obstetrician if you should be on this medication. For more resources, visit:  [employer]  If you smoke, try to reduce how many cigarettes you smoke or try to quit completely. Do not vape. Other warning signs to watch out for in pregnancy or postpartum: chest pain, obstructed breathing or shortness of breath, seizures, thoughts of hurting yourself or your baby, bleeding, a painful or swollen leg, fever, or headache (see [facility] POST-BIRTH Warning Signs campaign). If these happen call 911. Itching is also not normal in pregnancy and if you experience this, especially over your hands and feet, potentially worse at night, notify your doctors.

## 2023-08-08 ENCOUNTER — OFFICE VISIT (OUTPATIENT)
Dept: OBGYN CLINIC | Facility: CLINIC | Age: 33
End: 2023-08-08
Payer: COMMERCIAL

## 2023-08-08 DIAGNOSIS — Z3A.21 21 WEEKS GESTATION OF PREGNANCY: ICD-10-CM

## 2023-08-08 DIAGNOSIS — Z34.82 PRENATAL CARE, SUBSEQUENT PREGNANCY, SECOND TRIMESTER: Primary | ICD-10-CM

## 2023-08-08 DIAGNOSIS — O26.899 RIGHT UPPER QUADRANT ABDOMINAL PAIN AFFECTING PREGNANCY: ICD-10-CM

## 2023-08-08 DIAGNOSIS — R10.11 RIGHT UPPER QUADRANT ABDOMINAL PAIN AFFECTING PREGNANCY: ICD-10-CM

## 2023-08-08 PROCEDURE — 99213 OFFICE O/P EST LOW 20 MIN: CPT | Performed by: OBSTETRICS & GYNECOLOGY

## 2023-08-08 NOTE — PATIENT INSTRUCTIONS
Topic: 21-5/7 weeks intrauterine pregnancy    Patient complaining of some vague musculoskeletal right upper quadrant discomfort. We will check baseline CBC and CMP at today's visit.     Otherwise no complaints    Good fetal activity    We will see patient in 2 to 3 weeks for routine prenatal follow-up    Patient to call for any problems, questions, issues or concerns which may arise for her

## 2023-08-08 NOTE — PROGRESS NOTES
Assessment/Plan:    No problem-specific Assessment & Plan notes found for this encounter. Diagnoses and all orders for this visit:    Prenatal care, subsequent pregnancy, second trimester    21 weeks gestation of pregnancy    Right upper quadrant abdominal pain affecting pregnancy  -     Comprehensive metabolic panel  -     CBC and differential        Subjective:      Patient ID: Lacey Rooney is a 28 y.o. female. Patient is a 78-year-old female who presents today complaining of mild right upper quadrant intermittent pain and discomfort relieved with lying down. He denies any associated dyspepsia or change in appetite or digestion. It appears that the discomfort is most likely secondary to musculoskeletal issues    Check CBC as well as CMP to evaluate liver functions at this time as well    Patient reports normal fetal activity    She denies any leakage or bleeding    We will see patient back in approximately 2 to 3 weeks for follow-up    Patient knows to call for any problems, questions, issues or concerns which arise for her during the interim      Fetal heart tones at today's visit were 155         Total time of today's visit was 20 minutes of which greater than 50% was spent face-to-face counseling the patient as well as coordination of care, review of chart and lab values, physical examination as well as computer entry into the [facility] medical record system. The following portions of the patient's history were reviewed and updated as appropriate: allergies, current medications, past family history, past medical history, past social history, past surgical history and problem list.    Review of Systems   Constitutional: Negative. Negative for appetite change, diaphoresis, fatigue, fever and unexpected weight change. HENT: Negative. Eyes: Negative. Respiratory: Negative. Cardiovascular: Negative. Gastrointestinal: Negative.   Negative for abdominal pain, blood in stool, constipation, diarrhea, nausea and vomiting. Endocrine: Negative. Negative for cold intolerance and heat intolerance. Genitourinary: Negative. Negative for dysuria, frequency, hematuria, urgency, vaginal bleeding, vaginal discharge and vaginal pain. Musculoskeletal: Negative. Skin: Negative. Allergic/Immunologic: Negative. Neurological: Negative. Hematological: Negative. Negative for adenopathy. Psychiatric/Behavioral: Negative. Objective:      LMP 03/13/2023          Physical Exam  Constitutional:       Appearance: She is well-developed. HENT:      Head: Normocephalic. Eyes:      Pupils: Pupils are equal, round, and reactive to light. Cardiovascular:      Rate and Rhythm: Normal rate. Pulmonary:      Effort: Pulmonary effort is normal.   Abdominal:      Palpations: Abdomen is soft. Comments: Uterine height is normal for gestational age at U +1   Musculoskeletal:         General: Normal range of motion. Cervical back: Normal range of motion and neck supple. Skin:     General: Skin is warm and dry. Neurological:      General: No focal deficit present. Mental Status: She is alert and oriented to person, place, and time. Psychiatric:         Mood and Affect: Mood normal.         Behavior: Behavior normal.         Thought Content:  Thought content normal.         Judgment: Judgment normal.

## 2023-08-09 NOTE — PROGRESS NOTES
[facility]: Ms. Sherri Briseno was seen today for anatomic survey and cervical length screening ultrasound. See ultrasound report under "OB Procedures" tab. The time spent on this established patient on the encounter date included 5 minutes previsit service time reviewing records and precharting, 10 minutes face-to-face service time counseling regarding results and coordinating care, and  5 minutes charting, totalling 20 minutes. Please don't hesitate to contact our office with any concerns or questions.   Drinda Gottron, MD

## 2023-08-28 ENCOUNTER — APPOINTMENT (OUTPATIENT)
Dept: LAB | Facility: CLINIC | Age: 33
End: 2023-08-28
Payer: COMMERCIAL

## 2023-08-28 LAB
ALBUMIN SERPL BCP-MCNC: 3.8 G/DL (ref 3.5–5)
ALP SERPL-CCNC: 91 U/L (ref 34–104)
ALT SERPL W P-5'-P-CCNC: 9 U/L (ref 7–52)
ANION GAP SERPL CALCULATED.3IONS-SCNC: 9 MMOL/L
AST SERPL W P-5'-P-CCNC: 10 U/L (ref 13–39)
BASOPHILS # BLD AUTO: 0.05 THOUSANDS/ÂΜL (ref 0–0.1)
BASOPHILS NFR BLD AUTO: 0 % (ref 0–1)
BILIRUB SERPL-MCNC: 0.27 MG/DL (ref 0.2–1)
BUN SERPL-MCNC: 7 MG/DL (ref 5–25)
CALCIUM SERPL-MCNC: 8.6 MG/DL (ref 8.4–10.2)
CHLORIDE SERPL-SCNC: 105 MMOL/L (ref 96–108)
CO2 SERPL-SCNC: 23 MMOL/L (ref 21–32)
CREAT SERPL-MCNC: 0.61 MG/DL (ref 0.6–1.3)
EOSINOPHIL # BLD AUTO: 0.11 THOUSAND/ÂΜL (ref 0–0.61)
EOSINOPHIL NFR BLD AUTO: 1 % (ref 0–6)
ERYTHROCYTE [DISTWIDTH] IN BLOOD BY AUTOMATED COUNT: 12.2 % (ref 11.6–15.1)
GFR SERPL CREATININE-BSD FRML MDRD: 118 ML/MIN/1.73SQ M
GLUCOSE P FAST SERPL-MCNC: 75 MG/DL (ref 65–99)
HCT VFR BLD AUTO: 36.7 % (ref 34.8–46.1)
HGB BLD-MCNC: 12.2 G/DL (ref 11.5–15.4)
IMM GRANULOCYTES # BLD AUTO: 0.12 THOUSAND/UL (ref 0–0.2)
IMM GRANULOCYTES NFR BLD AUTO: 1 % (ref 0–2)
LYMPHOCYTES # BLD AUTO: 1.82 THOUSANDS/ÂΜL (ref 0.6–4.47)
LYMPHOCYTES NFR BLD AUTO: 14 % (ref 14–44)
MCH RBC QN AUTO: 31.8 PG (ref 26.8–34.3)
MCHC RBC AUTO-ENTMCNC: 33.2 G/DL (ref 31.4–37.4)
MCV RBC AUTO: 96 FL (ref 82–98)
MONOCYTES # BLD AUTO: 0.49 THOUSAND/ÂΜL (ref 0.17–1.22)
MONOCYTES NFR BLD AUTO: 4 % (ref 4–12)
NEUTROPHILS # BLD AUTO: 10.63 THOUSANDS/ÂΜL (ref 1.85–7.62)
NEUTS SEG NFR BLD AUTO: 80 % (ref 43–75)
NRBC BLD AUTO-RTO: 0 /100 WBCS
PLATELET # BLD AUTO: 171 THOUSANDS/UL (ref 149–390)
PMV BLD AUTO: 13.1 FL (ref 8.9–12.7)
POTASSIUM SERPL-SCNC: 3.8 MMOL/L (ref 3.5–5.3)
PROT SERPL-MCNC: 6.7 G/DL (ref 6.4–8.4)
RBC # BLD AUTO: 3.84 MILLION/UL (ref 3.81–5.12)
SODIUM SERPL-SCNC: 137 MMOL/L (ref 135–147)
WBC # BLD AUTO: 13.22 THOUSAND/UL (ref 4.31–10.16)

## 2023-08-29 ENCOUNTER — ROUTINE PRENATAL (OUTPATIENT)
Dept: OBGYN CLINIC | Facility: CLINIC | Age: 33
End: 2023-08-29
Payer: COMMERCIAL

## 2023-08-29 VITALS
WEIGHT: 204.6 LBS | HEIGHT: 65 IN | DIASTOLIC BLOOD PRESSURE: 78 MMHG | SYSTOLIC BLOOD PRESSURE: 124 MMHG | BODY MASS INDEX: 34.09 KG/M2

## 2023-08-29 DIAGNOSIS — Z3A.24 24 WEEKS GESTATION OF PREGNANCY: ICD-10-CM

## 2023-08-29 DIAGNOSIS — Z36.9 ANTENATAL SCREENING ENCOUNTER: ICD-10-CM

## 2023-08-29 DIAGNOSIS — Z34.82 PRENATAL CARE, SUBSEQUENT PREGNANCY, SECOND TRIMESTER: Primary | ICD-10-CM

## 2023-08-29 PROCEDURE — 99213 OFFICE O/P EST LOW 20 MIN: CPT | Performed by: OBSTETRICS & GYNECOLOGY

## 2023-08-29 NOTE — PROGRESS NOTES
Assessment     Pregnancy 24 and 5/7 weeks     Plan     OBGCT: ordered for next visit. Follow up in 4 weeks. Subjective     Judie Cabrera is a 28 y.o. female being seen today for her obstetrical visit. She is at 24w5d gestation. Patient reports backache, fatigue, no bleeding and no leaking. Fetal movement: normal.    Menstrual History:  OB History        2    Para   1    Term   1       0    AB   0    Living   1       SAB   0    IAB   0    Ectopic   0    Multiple   0    Live Births   1                Menarche age:   Patient's last menstrual period was 2023. The following portions of the patient's history were reviewed and updated as appropriate: allergies, current medications, past family history, past medical history, past social history, past surgical history and problem list.    Review of Systems  Pertinent items are noted in HPI.      Objective      /78   Ht 5' 5" (1.651 m)   Wt 92.8 kg (204 lb 9.6 oz)   LMP 2023   BMI 34.05 kg/m²   FHT: 150 BPM   Uterine Size: size equals dates

## 2023-09-25 ENCOUNTER — ULTRASOUND (OUTPATIENT)
Dept: PERINATAL CARE | Facility: CLINIC | Age: 33
End: 2023-09-25
Payer: COMMERCIAL

## 2023-09-25 VITALS
SYSTOLIC BLOOD PRESSURE: 118 MMHG | HEART RATE: 92 BPM | BODY MASS INDEX: 34.99 KG/M2 | HEIGHT: 65 IN | DIASTOLIC BLOOD PRESSURE: 70 MMHG | WEIGHT: 210 LBS

## 2023-09-25 DIAGNOSIS — Z3A.28 28 WEEKS GESTATION OF PREGNANCY: ICD-10-CM

## 2023-09-25 DIAGNOSIS — O26.02 EXCESSIVE WEIGHT GAIN DURING PREGNANCY IN SECOND TRIMESTER: Primary | ICD-10-CM

## 2023-09-25 PROBLEM — A59.01 TRICHOMONAL VAGINITIS: Status: RESOLVED | Noted: 2019-01-03 | Resolved: 2023-09-25

## 2023-09-25 PROCEDURE — 76816 OB US FOLLOW-UP PER FETUS: CPT | Performed by: OBSTETRICS & GYNECOLOGY

## 2023-09-25 PROCEDURE — 99213 OFFICE O/P EST LOW 20 MIN: CPT | Performed by: OBSTETRICS & GYNECOLOGY

## 2023-09-25 NOTE — LETTER
2023     Xochiltbrina Benji, [address] [place] [address] [address]    Patient: Jaqueline Dodd   YOB: 1990   Date of Visit: 2023       Dear Dr. Raphael Carney: Thank you for referring Alfred Ryan to me for evaluation. Below are my notes for this consultation. If you have questions, please do not hesitate to call me. I look forward to following your patient along with you. Sincerely,        Rona Dalal MD        CC: No Recipients    Rona Dalal MD  2023  4:01 PM  Sign when Signing Visit  Jaqueline Dodd has no complaints today. She reports regular fetal movements and does not report any problems. She is here today at 28w4d for an ultrasound for growth and missed anatomy. Problem list:  1. Excessive weight gain of 60 pounds. She needs to complete her 28-week labs. Ultrasound findings: The ultrasound today shows normal interval fetal growth and fluid. The prior missed anatomy was reviewed and appears normal except that views of the palate were limited by fetal position. Pregnancy ultrasound has limitations and is unable to detect all forms of fetal congenital abnormalities. The inaccuracy in the EFW can be off by 1 lb either way in the third trimester. Specific counseling was provided on the following problems:  1. Ness confirmed that she has gained about 60 pounds in this pregnancy. She reports she gained about 80 pounds in her previous pregnancy. Her last baby was average in size at birth weighing about 7 pounds. We discussed the increased risk for developing diabetes and preeclampsia and a large baby (macrosomic) and need for  section with excessive weight gain. I offered her nutrition counseling which she declined. Other options include exercise to slow down any further weight gain. Follow up recommended:   1.  Recommend a follow-up ultrasound in 6 weeks for an estimated fetal weight secondary to excessive weight gain.    Pre visit time reviewing her records   10 minutes  Face to face time 5 minutes  Post visit time on documentation of note, updating her problem list, adding orders and prescriptions 10 minutes. Procedures that were completed today were charged separately. The level of decision making was low level complexity.     Christa Gramajo MD

## 2023-09-25 NOTE — PROGRESS NOTES
Ashley Nayak has no complaints today. She reports regular fetal movements and does not report any problems. She is here today at 28w4d for an ultrasound for growth and missed anatomy. Problem list:  1. Excessive weight gain of 60 pounds. She needs to complete her 28-week labs. Ultrasound findings: The ultrasound today shows normal interval fetal growth and fluid. The prior missed anatomy was reviewed and appears normal except that views of the palate were limited by fetal position. Pregnancy ultrasound has limitations and is unable to detect all forms of fetal congenital abnormalities. The inaccuracy in the EFW can be off by 1 lb either way in the third trimester. Specific counseling was provided on the following problems:  1. Ness confirmed that she has gained about 60 pounds in this pregnancy. She reports she gained about 80 pounds in her previous pregnancy. Her last baby was average in size at birth weighing about 7 pounds. We discussed the increased risk for developing diabetes and preeclampsia and a large baby (macrosomic) and need for  section with excessive weight gain. I offered her nutrition counseling which she declined. Other options include exercise to slow down any further weight gain. Follow up recommended:   1. Recommend a follow-up ultrasound in 6 weeks for an estimated fetal weight secondary to excessive weight gain. Pre visit time reviewing her records   10 minutes  Face to face time 5 minutes  Post visit time on documentation of note, updating her problem list, adding orders and prescriptions 10 minutes. Procedures that were completed today were charged separately. The level of decision making was low level complexity.     Mikel Rider MD

## 2023-09-28 ENCOUNTER — ROUTINE PRENATAL (OUTPATIENT)
Dept: OBGYN CLINIC | Facility: CLINIC | Age: 33
End: 2023-09-28
Payer: COMMERCIAL

## 2023-09-28 VITALS — BODY MASS INDEX: 35.61 KG/M2 | SYSTOLIC BLOOD PRESSURE: 116 MMHG | WEIGHT: 214 LBS | DIASTOLIC BLOOD PRESSURE: 72 MMHG

## 2023-09-28 DIAGNOSIS — Z34.93 PRENATAL CARE IN THIRD TRIMESTER: Primary | ICD-10-CM

## 2023-09-28 PROCEDURE — 99215 OFFICE O/P EST HI 40 MIN: CPT | Performed by: OBSTETRICS & GYNECOLOGY

## 2023-09-28 NOTE — PROGRESS NOTES
Patient reports good fm, no n/v, headache, cramping, bleeding, loss of fluid, edema, dom violence, or smoking. krystina pnv urine tr protein/neg glucose, encouraged to go for labs,  Will order breast pump today, return in 2 weeks or sooner as needed.

## 2023-09-29 LAB
DME PARACHUTE DELIVERY DATE REQUESTED: NORMAL
DME PARACHUTE ITEM DESCRIPTION: NORMAL
DME PARACHUTE ORDER STATUS: NORMAL
DME PARACHUTE SUPPLIER NAME: NORMAL
DME PARACHUTE SUPPLIER PHONE: NORMAL

## 2023-10-04 ENCOUNTER — APPOINTMENT (OUTPATIENT)
Dept: LAB | Facility: CLINIC | Age: 33
End: 2023-10-04
Payer: COMMERCIAL

## 2023-10-04 DIAGNOSIS — Z36.9 ANTENATAL SCREENING ENCOUNTER: ICD-10-CM

## 2023-10-04 LAB
BASOPHILS # BLD AUTO: 0.02 THOUSANDS/ÂΜL (ref 0–0.1)
BASOPHILS NFR BLD AUTO: 0 % (ref 0–1)
DME PARACHUTE DELIVERY DATE REQUESTED: NORMAL
DME PARACHUTE ITEM DESCRIPTION: NORMAL
DME PARACHUTE ORDER STATUS: NORMAL
DME PARACHUTE SUPPLIER NAME: NORMAL
DME PARACHUTE SUPPLIER PHONE: NORMAL
EOSINOPHIL # BLD AUTO: 0.13 THOUSAND/ÂΜL (ref 0–0.61)
EOSINOPHIL NFR BLD AUTO: 1 % (ref 0–6)
ERYTHROCYTE [DISTWIDTH] IN BLOOD BY AUTOMATED COUNT: 12.7 % (ref 11.6–15.1)
GLUCOSE 1H P 50 G GLC PO SERPL-MCNC: 137 MG/DL (ref 40–134)
HCT VFR BLD AUTO: 33.5 % (ref 34.8–46.1)
HGB BLD-MCNC: 11.5 G/DL (ref 11.5–15.4)
IMM GRANULOCYTES # BLD AUTO: 0.11 THOUSAND/UL (ref 0–0.2)
IMM GRANULOCYTES NFR BLD AUTO: 1 % (ref 0–2)
LYMPHOCYTES # BLD AUTO: 1.45 THOUSANDS/ÂΜL (ref 0.6–4.47)
LYMPHOCYTES NFR BLD AUTO: 11 % (ref 14–44)
MCH RBC QN AUTO: 32.7 PG (ref 26.8–34.3)
MCHC RBC AUTO-ENTMCNC: 34.3 G/DL (ref 31.4–37.4)
MCV RBC AUTO: 95 FL (ref 82–98)
MONOCYTES # BLD AUTO: 0.51 THOUSAND/ÂΜL (ref 0.17–1.22)
MONOCYTES NFR BLD AUTO: 4 % (ref 4–12)
NEUTROPHILS # BLD AUTO: 10.86 THOUSANDS/ÂΜL (ref 1.85–7.62)
NEUTS SEG NFR BLD AUTO: 83 % (ref 43–75)
NRBC BLD AUTO-RTO: 0 /100 WBCS
PLATELET # BLD AUTO: 152 THOUSANDS/UL (ref 149–390)
PMV BLD AUTO: 13.3 FL (ref 8.9–12.7)
RBC # BLD AUTO: 3.52 MILLION/UL (ref 3.81–5.12)
WBC # BLD AUTO: 13.08 THOUSAND/UL (ref 4.31–10.16)

## 2023-10-04 PROCEDURE — 86780 TREPONEMA PALLIDUM: CPT

## 2023-10-04 PROCEDURE — 85025 COMPLETE CBC W/AUTO DIFF WBC: CPT

## 2023-10-04 PROCEDURE — 36415 COLL VENOUS BLD VENIPUNCTURE: CPT

## 2023-10-04 PROCEDURE — 82950 GLUCOSE TEST: CPT

## 2023-10-05 LAB — TREPONEMA PALLIDUM IGG+IGM AB [PRESENCE] IN SERUM OR PLASMA BY IMMUNOASSAY: NORMAL

## 2023-10-16 ENCOUNTER — TELEPHONE (OUTPATIENT)
Dept: OBGYN CLINIC | Facility: CLINIC | Age: 33
End: 2023-10-16

## 2023-10-16 DIAGNOSIS — R73.09 ABNORMAL GLUCOSE MEASUREMENT: Primary | ICD-10-CM

## 2023-10-16 NOTE — TELEPHONE ENCOUNTER
----- Message from David Guerra MD sent at 10/13/2023  3:25 PM EDT -----  1 hour Glucola is 137    Patient needs 3-hour GTT    Thanks

## 2023-10-19 ENCOUNTER — ROUTINE PRENATAL (OUTPATIENT)
Dept: OBGYN CLINIC | Facility: CLINIC | Age: 33
End: 2023-10-19

## 2023-10-19 VITALS — SYSTOLIC BLOOD PRESSURE: 110 MMHG | WEIGHT: 222 LBS | BODY MASS INDEX: 36.94 KG/M2 | DIASTOLIC BLOOD PRESSURE: 70 MMHG

## 2023-10-19 DIAGNOSIS — Z3A.32 32 WEEKS GESTATION OF PREGNANCY: ICD-10-CM

## 2023-10-19 DIAGNOSIS — Z34.93 PRENATAL CARE IN THIRD TRIMESTER: Primary | ICD-10-CM

## 2023-10-19 NOTE — PROGRESS NOTES
Kristy Sacks is a 28 y.o.  32w0d. Reports ++FM, no LOF, VB, or regular contractions.      Vitals:    10/19/23 0800   BP: 110/70     S=D  +FHTs    A/P:  Third tri labs notable for elevated 1hr, needs 3hr GTT  Rh status POS  TDAP vaccine--considering, will offer   Contraception: Depo  Breastfeeding: would like to breastfeed, plans to pump already ordered (reviewed maternal and fetal benefits)  Birth plan: yes to epidural  [Hx of open appy and supraumbilical hernia repair with mesh]    Discussed pre-term labor precautions  Return to office in 2 weeks

## 2023-10-23 ENCOUNTER — APPOINTMENT (OUTPATIENT)
Dept: LAB | Facility: CLINIC | Age: 33
End: 2023-10-23
Payer: COMMERCIAL

## 2023-10-23 DIAGNOSIS — R73.09 ABNORMAL GLUCOSE MEASUREMENT: ICD-10-CM

## 2023-10-23 LAB
GLUCOSE 1H P 100 G GLC PO SERPL-MCNC: 199 MG/DL (ref 65–179)
GLUCOSE 2H P 100 G GLC PO SERPL-MCNC: 147 MG/DL (ref 65–154)
GLUCOSE 3H P 100 G GLC PO SERPL-MCNC: 113 MG/DL (ref 65–139)
GLUCOSE P FAST SERPL-MCNC: 84 MG/DL (ref 65–94)

## 2023-10-23 PROCEDURE — 82952 GTT-ADDED SAMPLES: CPT

## 2023-10-23 PROCEDURE — 36415 COLL VENOUS BLD VENIPUNCTURE: CPT

## 2023-10-23 PROCEDURE — 82951 GLUCOSE TOLERANCE TEST (GTT): CPT

## 2023-11-01 NOTE — PROGRESS NOTES
[place] is a 28 y.o.  34w0d. Reports ++FM, no LOF, VB, or regular contractions.      Vitals:    23 1400   BP: 116/72   S=D  +FHTs    A/P:  Third trim notable for elevated 1hr GTT, 3hr GTT 1/4 elevated  TDAP vaccine--declined  Contraception: Depo  Breastfeeding: would like to breastfeed, plans to pump already ordered (reviewed maternal and fetal benefits)  Birth plan: yes to epidural  [Hx of open appy and supraumbilical hernia repair with mesh]    Discussed pre-term labor precautions  Return to office in 2 weeks

## 2023-11-02 ENCOUNTER — ROUTINE PRENATAL (OUTPATIENT)
Dept: OBGYN CLINIC | Facility: CLINIC | Age: 33
End: 2023-11-02
Payer: COMMERCIAL

## 2023-11-02 VITALS — WEIGHT: 221 LBS | DIASTOLIC BLOOD PRESSURE: 72 MMHG | BODY MASS INDEX: 36.78 KG/M2 | SYSTOLIC BLOOD PRESSURE: 116 MMHG

## 2023-11-02 DIAGNOSIS — Z34.93 PRENATAL CARE, THIRD TRIMESTER: ICD-10-CM

## 2023-11-02 DIAGNOSIS — Z3A.34 34 WEEKS GESTATION OF PREGNANCY: Primary | ICD-10-CM

## 2023-11-02 PROCEDURE — 99213 OFFICE O/P EST LOW 20 MIN: CPT | Performed by: STUDENT IN AN ORGANIZED HEALTH CARE EDUCATION/TRAINING PROGRAM

## 2023-11-06 ENCOUNTER — ULTRASOUND (OUTPATIENT)
Dept: PERINATAL CARE | Facility: CLINIC | Age: 33
End: 2023-11-06
Payer: COMMERCIAL

## 2023-11-06 VITALS
WEIGHT: 220 LBS | DIASTOLIC BLOOD PRESSURE: 65 MMHG | SYSTOLIC BLOOD PRESSURE: 118 MMHG | HEIGHT: 65 IN | HEART RATE: 92 BPM | BODY MASS INDEX: 36.65 KG/M2

## 2023-11-06 DIAGNOSIS — Z3A.34 34 WEEKS GESTATION OF PREGNANCY: ICD-10-CM

## 2023-11-06 DIAGNOSIS — O26.02 EXCESSIVE WEIGHT GAIN DURING PREGNANCY IN SECOND TRIMESTER: ICD-10-CM

## 2023-11-06 DIAGNOSIS — O99.331 TOBACCO SMOKING AFFECTING PREGNANCY IN FIRST TRIMESTER: Primary | ICD-10-CM

## 2023-11-06 PROCEDURE — 76816 OB US FOLLOW-UP PER FETUS: CPT | Performed by: OBSTETRICS & GYNECOLOGY

## 2023-11-06 PROCEDURE — 99212 OFFICE O/P EST SF 10 MIN: CPT | Performed by: OBSTETRICS & GYNECOLOGY

## 2023-11-06 NOTE — PROGRESS NOTES
The patient was seen today for an ultrasound. Please see ultrasound report (located under Ob Procedures) for additional details. Thank you very much for allowing us to participate in the care of this very nice patient. Should you have any questions, please do not hesitate to contact me. Daniel Peac[place] [id number] [facility]  Attending Physician, [address] [place] [address]

## 2023-11-13 ENCOUNTER — ROUTINE PRENATAL (OUTPATIENT)
Dept: OBGYN CLINIC | Facility: CLINIC | Age: 33
End: 2023-11-13
Payer: COMMERCIAL

## 2023-11-13 VITALS — DIASTOLIC BLOOD PRESSURE: 70 MMHG | BODY MASS INDEX: 37.28 KG/M2 | WEIGHT: 224 LBS | SYSTOLIC BLOOD PRESSURE: 110 MMHG

## 2023-11-13 DIAGNOSIS — Z34.93 THIRD TRIMESTER PREGNANCY: Primary | ICD-10-CM

## 2023-11-13 PROCEDURE — 99212 OFFICE O/P EST SF 10 MIN: CPT | Performed by: PHYSICIAN ASSISTANT

## 2023-11-13 NOTE — PROGRESS NOTES
OB/GYN  PN Visit  Graham Zuniga  [id number]  2023  2:47 PM  Dante Salguero PA-C    S: 28 y.o. Debbie Babb 35w4d here for PN visit. OB complaints:  Denies c/o n/v/ha, no edema, no smoking, no DV. Advised FKCs. No vb/lof  No cramping/ctxns or signs of PTL. She reports overall that she is feeling well. O:    Pre-Jerry Vitals      Flowsheet Row Most Recent Value   Prenatal Assessment    Prenatal Vitals    Blood Pressure 110/70   Weight - Scale 102 kg (224 lb)   Urine Albumin/Glucose    Dilation/Effacement/Station    Vaginal Drainage    Edema               Gen: no acute distress, nonlabored breathing. OB exam completed: fundal height, +FHT. Urine: -/-    A/P:  #1. 35w4d GESTATION  Labor precautions reviewed  Fetal kick counts reviewed  Breast pump: ordered  Contraception: desires DPMA for BC.  Aware can be given at hospital prior to d/c.   GBS: to be done at next visit    RTC in 1 weeks    Dante Salguero PA-C  2023  2:47 PM

## 2023-11-20 ENCOUNTER — ROUTINE PRENATAL (OUTPATIENT)
Dept: OBGYN CLINIC | Facility: CLINIC | Age: 33
End: 2023-11-20
Payer: COMMERCIAL

## 2023-11-20 VITALS — DIASTOLIC BLOOD PRESSURE: 74 MMHG | SYSTOLIC BLOOD PRESSURE: 108 MMHG | WEIGHT: 222 LBS | BODY MASS INDEX: 36.94 KG/M2

## 2023-11-20 DIAGNOSIS — Z34.93 PRENATAL CARE IN THIRD TRIMESTER: Primary | ICD-10-CM

## 2023-11-20 PROCEDURE — 99213 OFFICE O/P EST LOW 20 MIN: CPT | Performed by: STUDENT IN AN ORGANIZED HEALTH CARE EDUCATION/TRAINING PROGRAM

## 2023-11-20 PROCEDURE — 87150 DNA/RNA AMPLIFIED PROBE: CPT | Performed by: STUDENT IN AN ORGANIZED HEALTH CARE EDUCATION/TRAINING PROGRAM

## 2023-11-20 NOTE — PROGRESS NOTES
Mona is a 29 yo  at 36w4d presenting for routine PNC- she denies any CTX, LOF or VB. She endorses regular FM. Urine neg/neg. GBS collected today and reviewed- not PCN allergic. She does continue to smoke tobacco occasionally and denies any cravings for h/o OUD.   EFW 2023 was 63%ile completed for excessive weight gain this pregnancy. [address] and labor precautions reviewed- RTO in 1 week for labor talk.

## 2023-11-20 NOTE — PATIENT INSTRUCTIONS
Pregnancy at 28 to 38 Weeks   AMBULATORY CARE:   Changes happening with your body: You are considered full term at the beginning of 37 weeks. Your breathing may be easier if your baby has moved down into a head-down position. You may need to urinate more often because the baby may be pressing on your bladder. You may also feel more discomfort and get tired easily. Seek care immediately if:   You develop a severe headache that does not go away. You have new or increased vision changes, such as blurred or spotted vision. You have new or increased swelling in your face or hands. You have vaginal spotting or bleeding. Your water broke or you feel warm water gushing or trickling from your vagina. Call your obstetrician if:   You have more than 5 contractions in 1 hour. You notice any changes in your baby's movements. You have abdominal cramps, pressure, or tightening. You have a change in vaginal discharge. You have chills or a fever. You have vaginal itching, burning, or pain. You have yellow, green, white, or foul-smelling vaginal discharge. You have pain or burning when you urinate, less urine than usual, or pink or bloody urine. You have questions or concerns about your condition or care. How to care for yourself at this stage of your pregnancy:       Eat a variety of healthy foods. Healthy foods include fruits, vegetables, whole-grain breads, low-fat dairy foods, beans, lean meats, and fish. Drink liquids as directed. Ask how much liquid to drink each day and which liquids are best for you. Limit caffeine to less than 200 milligrams each day. Limit your intake of fish to 2 servings each week. Choose fish low in mercury such as canned light tuna, shrimp, salmon, cod, or tilapia. Do not  eat fish high in mercury such as swordfish, tilefish, jaylan mackerel, and shark. Take prenatal vitamins as directed.   Your need for certain vitamins and minerals, such as folic acid, increases during pregnancy. Prenatal vitamins provide some of the extra vitamins and minerals you need. Prenatal vitamins may also help to decrease the risk of certain birth defects. Rest as needed. Put your feet up if you have swelling in your ankles and feet. Talk to your healthcare provider about exercise. Moderate exercise can help you stay fit. Your healthcare provider will help you plan an exercise program that is safe for you during pregnancy. Do not smoke. Smoking increases your risk of a miscarriage and other health problems during your pregnancy. Smoking can cause your baby to be born early or weigh less at birth. Ask your healthcare provider for information if you need help quitting. Do not drink alcohol. Alcohol passes from your body to your baby through the placenta. It can affect your baby's brain development and cause fetal alcohol syndrome (FAS). FAS is a group of conditions that causes mental, behavior, and growth problems. Talk to your healthcare provider before you take any medicines. Many medicines may harm your baby if you take them when you are pregnant. Do not take any medicines, vitamins, herbs, or supplements without first talking to your healthcare provider. Never use illegal or street drugs (such as marijuana or cocaine) while you are pregnant. Safety tips during pregnancy:   Avoid hot tubs and saunas. Do not use a hot tub or sauna while you are pregnant, especially during your first trimester. Hot tubs and saunas may raise your baby's temperature and increase the risk of birth defects. Avoid toxoplasmosis. This is an infection caused by eating raw meat or being around infected cat feces. It can cause birth defects, miscarriages, and other problems. Wash your hands after you touch raw meat. Make sure any meat is well-cooked before you eat it. Avoid raw eggs and unpasteurized milk.  Use gloves or ask someone else to clean your cat's litter box while you are pregnant. Ask your healthcare provider about travel. The most comfortable time to travel is during the second trimester. Ask your provider if you can travel after 36 weeks. You may not be able to travel in an airplane after 36 weeks. He or she may also recommend you avoid long road trips. Changes happening with your baby:  By 38 weeks, your baby may weigh between 6 and 9 pounds. Your baby may be about 14 inches long from the top of the head to the rump (baby's bottom). Your baby hears well enough to know your voice. As your baby gets larger, you may feel fewer kicks and more stretching and rolling. Your baby may move into a head-down position. Your baby will also rest lower in your abdomen. What you need to know about prenatal care: Your healthcare provider will check your blood pressure and weight. You may also need the following:  A urine test  may also be done to check for sugar and protein. These can be signs of gestational diabetes or infection. Protein in your urine may also be a sign of preeclampsia. Preeclampsia is a condition that can develop during week 20 or later of your pregnancy. It causes high blood pressure, and it can cause problems with your kidneys and other organs. A gestational diabetes screen  may be done. Your healthcare provider may order either a 1-step or 2-step oral glucose tolerance test (OGTT). 1-step OGTT:  Your blood sugar level will be tested after you have not eaten for 8 hours (fasting). You will then be given a glucose drink. Your level will be tested again 1 hour and 2 hours after you finish the drink. 2-step OGTT:  You do not have to fast for the first part of the test. You will have the glucose drink at any time of day. Your blood sugar level will be checked 1 hour later. If your blood sugar is higher than a certain level, another test will be ordered. You will fast and your blood sugar level will be tested. You will have the glucose drink. Your blood will be tested again 1 hour, 2 hours, and 3 hours after you finish the glucose drink. A blood test  may be done to check for anemia (low iron level). A Tdap vaccine  may be recommended by your healthcare provider. A group B strep test  is a test that is done to check for group B strep infection. Group B strep is a type of bacteria that may be found in the vagina or rectum. It can be passed to your baby during delivery if you have it. Your healthcare provider will take swab your vagina or rectum and send the sample to the lab for tests. Fundal height  is a measurement of your uterus to check your baby's growth. This number is usually the same as the number of weeks that you have been pregnant. Your healthcare provider may also check your baby's position. Your baby's heart rate  will be checked. Follow up with your obstetrician as directed:  Write down your questions so you remember to ask them during your visits. © Copyright Dorinda Fox 2023 Information is for End User's use only and may not be sold, redistributed or otherwise used for commercial purposes. The above information is an  only. It is not intended as medical advice for individual conditions or treatments. Talk to your doctor, nurse or pharmacist before following any medical regimen to see if it is safe and effective for you. Kick Counts in Pregnancy   WHAT YOU NEED TO KNOW:   Kick counts measure how much your baby is moving in your womb. A kick from your baby can be felt as a twist, turn, swish, roll, or jab. It is common to feel your baby kicking at 26 to 28 weeks of pregnancy. You may feel your baby kick as early as 20 weeks of pregnancy. You may want to start counting at 28 weeks. DISCHARGE INSTRUCTIONS:   Contact your doctor immediately if:   You feel a change in the number of kicks or movements of your baby. You feel fewer than 10 kicks within 2 hours.      You have questions or concerns about your baby's movements. Why measure kick counts:  Your baby's movement may provide information about your baby's health. He or she may move less, or not at all, if there are problems. Your baby may move less if he or she is not getting enough oxygen or nutrition from the placenta. Do not smoke while you are pregnant. Smoking decreases the amount of oxygen that gets to your baby. Talk to your healthcare provider if you need help to quit smoking. Tell your healthcare provider as soon as you feel a change in your baby's movements. When to measure kick counts:   Measure kick counts at the same time every day. Measure kick counts when your baby is awake and most active. Your baby may be most active in the evening. How to measure kick counts:  Check that your baby is awake before you measure kick counts. You can wake up your baby by lightly pushing on your belly, walking, or drinking something cold. Your healthcare provider may tell you different ways to measure kick counts. You may be told to do the following:  Use a chart or clock to keep track of the time you start and finish counting. Sit in a chair or lie on your left side. Place your hands on the largest part of your belly. Count until you reach 10 kicks. Write down how much time it takes to count 10 kicks. It may take 30 minutes to 2 hours to count 10 kicks. It should not take more than 2 hours to count 10 kicks. Follow up with your doctor as directed:  Write down your questions so you remember to ask them during your visits. © Copyright Hanna Rodas 2023 Information is for End User's use only and may not be sold, redistributed or otherwise used for commercial purposes. The above information is an  only. It is not intended as medical advice for individual conditions or treatments. Talk to your doctor, nurse or pharmacist before following any medical regimen to see if it is safe and effective for you.

## 2023-11-22 LAB — GP B STREP DNA SPEC QL NAA+PROBE: NEGATIVE

## 2023-11-28 ENCOUNTER — ROUTINE PRENATAL (OUTPATIENT)
Dept: OBGYN CLINIC | Facility: CLINIC | Age: 33
End: 2023-11-28
Payer: COMMERCIAL

## 2023-11-28 VITALS
WEIGHT: 224.2 LBS | DIASTOLIC BLOOD PRESSURE: 70 MMHG | BODY MASS INDEX: 37.36 KG/M2 | HEIGHT: 65 IN | SYSTOLIC BLOOD PRESSURE: 104 MMHG

## 2023-11-28 DIAGNOSIS — Z34.93 PRENATAL CARE IN THIRD TRIMESTER: ICD-10-CM

## 2023-11-28 DIAGNOSIS — Z3A.37 37 WEEKS GESTATION OF PREGNANCY: Primary | ICD-10-CM

## 2023-11-28 PROCEDURE — 99213 OFFICE O/P EST LOW 20 MIN: CPT | Performed by: STUDENT IN AN ORGANIZED HEALTH CARE EDUCATION/TRAINING PROGRAM

## 2023-11-28 NOTE — PROGRESS NOTES
[place] is a 28 y.o.  37w5d. Reports ++FM, no LOF, VB, or regular contractions.      Vitals:    23 1000   BP: 104/70     S=D  +FHTs  SVE closed/50/soft    A/P:  Gbs neg  Vertex at 34 weeks  Rh status POS  Declines tdap/flu  Discussed term labor precautions  Return to office in 1 weeks

## 2023-11-28 NOTE — PROGRESS NOTES
[place] is a 28 y.o.  37w5d. Reports ++FM, no LOF, VB, or regular contractions.      Vitals:    23 1000   BP: 104/70     S=D  +FHTs  SVE 0/50/-3/soft/mid    A/P:  Rh status POS  Discussed term labor precautions  Return to office in 1 weeks

## 2023-12-14 ENCOUNTER — ROUTINE PRENATAL (OUTPATIENT)
Dept: OBGYN CLINIC | Facility: CLINIC | Age: 33
End: 2023-12-14
Payer: COMMERCIAL

## 2023-12-14 ENCOUNTER — TELEPHONE (OUTPATIENT)
Dept: OBGYN CLINIC | Facility: CLINIC | Age: 33
End: 2023-12-14

## 2023-12-14 VITALS — WEIGHT: 229 LBS | BODY MASS INDEX: 38.11 KG/M2 | SYSTOLIC BLOOD PRESSURE: 110 MMHG | DIASTOLIC BLOOD PRESSURE: 76 MMHG

## 2023-12-14 DIAGNOSIS — Z3A.40 40 WEEKS GESTATION OF PREGNANCY: Primary | ICD-10-CM

## 2023-12-14 DIAGNOSIS — Z34.93 PRENATAL CARE, THIRD TRIMESTER: ICD-10-CM

## 2023-12-14 PROCEDURE — 99213 OFFICE O/P EST LOW 20 MIN: CPT | Performed by: STUDENT IN AN ORGANIZED HEALTH CARE EDUCATION/TRAINING PROGRAM

## 2023-12-14 NOTE — Clinical Note
Marylene Gemma is a 28 y.o.  at 40w0d  Does not want IOL, needs NST added on to next week appt Thanks!

## 2023-12-14 NOTE — TELEPHONE ENCOUNTER
----- Message from Theodore Shields MD sent at 2023  1:34 PM EST -----  Gatito Brooks is a 28 y.o.  at 40w0d   Does not want IOL, needs NST added on to next week appt  Thanks!

## 2023-12-14 NOTE — TELEPHONE ENCOUNTER
NST scheduled for 12/21/23 at [address] [place] [address]. Placed call to patient, left message on answering machine, advising of NST time. Requested a return call to confirm.

## 2023-12-14 NOTE — PROGRESS NOTES
Soo Kuo is a 28 y.o.  40w0d. Reports ++FM, no LOF, VB, or regular contractions.      Vitals:    23 1300   BP: 110/76     +FHTs  Declines exam today    A/P:  Gbs neg  Vertex at 34 weeks  Rh status POS  Declines tdap/flu  1xSVD    Declines IOL  Discussed recommend for IOL after 41, if going into 41st week needs NST  At 42 weeks risk of stillbirth increased    Discussed term labor precautions  Return to office in 1 weeks

## 2023-12-16 LAB
DME PARACHUTE DELIVERY DATE ACTUAL: NORMAL
DME PARACHUTE DELIVERY DATE REQUESTED: NORMAL
DME PARACHUTE ITEM DESCRIPTION: NORMAL
DME PARACHUTE ORDER STATUS: NORMAL
DME PARACHUTE SUPPLIER NAME: NORMAL
DME PARACHUTE SUPPLIER PHONE: NORMAL

## 2023-12-17 ENCOUNTER — NURSE TRIAGE (OUTPATIENT)
Dept: OTHER | Facility: OTHER | Age: 33
End: 2023-12-17

## 2023-12-17 ENCOUNTER — HOSPITAL ENCOUNTER (INPATIENT)
Facility: HOSPITAL | Age: 33
LOS: 6 days | Discharge: HOME | DRG: 540 | End: 2023-12-23
Attending: STUDENT IN AN ORGANIZED HEALTH CARE EDUCATION/TRAINING PROGRAM | Admitting: STUDENT IN AN ORGANIZED HEALTH CARE EDUCATION/TRAINING PROGRAM
Payer: COMMERCIAL

## 2023-12-17 ENCOUNTER — ANESTHESIA EVENT (INPATIENT)
Dept: LABOR AND DELIVERY | Facility: HOSPITAL | Age: 33
DRG: 540 | End: 2023-12-17
Payer: COMMERCIAL

## 2023-12-17 DIAGNOSIS — R07.9 CHEST PAIN: ICD-10-CM

## 2023-12-17 DIAGNOSIS — Z3A.40 40 WEEKS GESTATION OF PREGNANCY: ICD-10-CM

## 2023-12-17 DIAGNOSIS — N13.30 HYDRONEPHROSIS OF LEFT KIDNEY: ICD-10-CM

## 2023-12-17 DIAGNOSIS — I49.9 CARDIAC ARRHYTHMIA: ICD-10-CM

## 2023-12-17 DIAGNOSIS — N17.9 AKI (ACUTE KIDNEY INJURY) (HCC): ICD-10-CM

## 2023-12-17 DIAGNOSIS — Z98.891 STATUS POST PRIMARY LOW TRANSVERSE CESAREAN SECTION: ICD-10-CM

## 2023-12-17 PROBLEM — Z98.890: Status: ACTIVE | Noted: 2023-12-17

## 2023-12-17 PROBLEM — O47.9 UTERINE CONTRACTIONS: Status: ACTIVE | Noted: 2023-12-17

## 2023-12-17 LAB
ABO GROUP BLD: NORMAL
AMPHETAMINES SERPL QL SCN: NEGATIVE
BARBITURATES UR QL: NEGATIVE
BENZODIAZ UR QL: NEGATIVE
BLD GP AB SCN SERPL QL: NEGATIVE
COCAINE UR QL: NEGATIVE
ERYTHROCYTE [DISTWIDTH] IN BLOOD BY AUTOMATED COUNT: 13.9 % (ref 11.6–15.1)
HCT VFR BLD AUTO: 39.5 % (ref 34.8–46.1)
HGB BLD-MCNC: 13.2 G/DL (ref 11.5–15.4)
HOLD SPECIMEN: NORMAL
MCH RBC QN AUTO: 30.5 PG (ref 26.8–34.3)
MCHC RBC AUTO-ENTMCNC: 33.4 G/DL (ref 31.4–37.4)
MCV RBC AUTO: 91 FL (ref 82–98)
METHADONE UR QL: NEGATIVE
OPIATES UR QL SCN: NEGATIVE
OXYCODONE+OXYMORPHONE UR QL SCN: NEGATIVE
PCP UR QL: NEGATIVE
PLATELET # BLD AUTO: 134 THOUSANDS/UL (ref 149–390)
RBC # BLD AUTO: 4.33 MILLION/UL (ref 3.81–5.12)
RH BLD: POSITIVE
SPECIMEN EXPIRATION DATE: NORMAL
THC UR QL: POSITIVE
WBC # BLD AUTO: 14.51 THOUSAND/UL (ref 4.31–10.16)

## 2023-12-17 PROCEDURE — 80307 DRUG TEST PRSMV CHEM ANLYZR: CPT

## 2023-12-17 PROCEDURE — 99214 OFFICE O/P EST MOD 30 MIN: CPT

## 2023-12-17 PROCEDURE — 85027 COMPLETE CBC AUTOMATED: CPT

## 2023-12-17 PROCEDURE — 86780 TREPONEMA PALLIDUM: CPT

## 2023-12-17 PROCEDURE — NC001 PR NO CHARGE: Performed by: STUDENT IN AN ORGANIZED HEALTH CARE EDUCATION/TRAINING PROGRAM

## 2023-12-17 PROCEDURE — 86900 BLOOD TYPING SEROLOGIC ABO: CPT

## 2023-12-17 PROCEDURE — 10907ZC DRAINAGE OF AMNIOTIC FLUID, THERAPEUTIC FROM PRODUCTS OF CONCEPTION, VIA NATURAL OR ARTIFICIAL OPENING: ICD-10-PCS | Performed by: STUDENT IN AN ORGANIZED HEALTH CARE EDUCATION/TRAINING PROGRAM

## 2023-12-17 PROCEDURE — 86901 BLOOD TYPING SEROLOGIC RH(D): CPT

## 2023-12-17 PROCEDURE — 86850 RBC ANTIBODY SCREEN: CPT

## 2023-12-17 PROCEDURE — 4A1HX4Z MONITORING OF PRODUCTS OF CONCEPTION, CARDIAC ELECTRICAL ACTIVITY, EXTERNAL APPROACH: ICD-10-PCS | Performed by: STUDENT IN AN ORGANIZED HEALTH CARE EDUCATION/TRAINING PROGRAM

## 2023-12-17 RX ORDER — SODIUM CHLORIDE, SODIUM LACTATE, POTASSIUM CHLORIDE, CALCIUM CHLORIDE 600; 310; 30; 20 MG/100ML; MG/100ML; MG/100ML; MG/100ML
125 INJECTION, SOLUTION INTRAVENOUS CONTINUOUS
Status: DISCONTINUED | OUTPATIENT
Start: 2023-12-17 | End: 2023-12-18

## 2023-12-17 RX ORDER — OXYTOCIN/RINGER'S LACTATE 30/500 ML
PLASTIC BAG, INJECTION (ML) INTRAVENOUS
Status: COMPLETED
Start: 2023-12-17 | End: 2023-12-18

## 2023-12-17 RX ORDER — ONDANSETRON 2 MG/ML
4 INJECTION INTRAMUSCULAR; INTRAVENOUS EVERY 4 HOURS PRN
Status: DISCONTINUED | OUTPATIENT
Start: 2023-12-17 | End: 2023-12-18

## 2023-12-17 RX ORDER — TERBUTALINE SULFATE 1 MG/ML
INJECTION, SOLUTION SUBCUTANEOUS
Status: DISPENSED
Start: 2023-12-17 | End: 2023-12-18

## 2023-12-17 RX ORDER — BUPIVACAINE HYDROCHLORIDE 2.5 MG/ML
30 INJECTION, SOLUTION EPIDURAL; INFILTRATION; INTRACAUDAL ONCE AS NEEDED
Status: DISCONTINUED | OUTPATIENT
Start: 2023-12-17 | End: 2023-12-18

## 2023-12-17 RX ORDER — LIDOCAINE HYDROCHLORIDE AND EPINEPHRINE 15; 5 MG/ML; UG/ML
INJECTION, SOLUTION EPIDURAL
Status: COMPLETED | OUTPATIENT
Start: 2023-12-17 | End: 2023-12-17

## 2023-12-17 RX ORDER — METOCLOPRAMIDE HYDROCHLORIDE 5 MG/ML
10 INJECTION INTRAMUSCULAR; INTRAVENOUS ONCE
Status: COMPLETED | OUTPATIENT
Start: 2023-12-17 | End: 2023-12-17

## 2023-12-17 RX ADMIN — ROPIVACAINE HYDROCHLORIDE: 2 INJECTION, SOLUTION EPIDURAL; INFILTRATION at 18:38

## 2023-12-17 RX ADMIN — SODIUM CHLORIDE, SODIUM LACTATE, POTASSIUM CHLORIDE, AND CALCIUM CHLORIDE 125 ML/HR: .6; .31; .03; .02 INJECTION, SOLUTION INTRAVENOUS at 17:50

## 2023-12-17 RX ADMIN — LIDOCAINE HYDROCHLORIDE AND EPINEPHRINE 2 ML: 15; 5 INJECTION, SOLUTION EPIDURAL at 18:30

## 2023-12-17 RX ADMIN — LIDOCAINE HYDROCHLORIDE AND EPINEPHRINE 3 ML: 15; 5 INJECTION, SOLUTION EPIDURAL at 18:27

## 2023-12-17 RX ADMIN — ONDANSETRON 4 MG: 2 INJECTION INTRAMUSCULAR; INTRAVENOUS at 17:22

## 2023-12-17 RX ADMIN — SODIUM CHLORIDE, SODIUM LACTATE, POTASSIUM CHLORIDE, AND CALCIUM CHLORIDE 999 ML/HR: .6; .31; .03; .02 INJECTION, SOLUTION INTRAVENOUS at 21:37

## 2023-12-17 RX ADMIN — SODIUM CHLORIDE, SODIUM LACTATE, POTASSIUM CHLORIDE, AND CALCIUM CHLORIDE 125 ML/HR: .6; .31; .03; .02 INJECTION, SOLUTION INTRAVENOUS at 22:00

## 2023-12-17 RX ADMIN — SODIUM CHLORIDE, SODIUM LACTATE, POTASSIUM CHLORIDE, AND CALCIUM CHLORIDE 999 ML/HR: .6; .31; .03; .02 INJECTION, SOLUTION INTRAVENOUS at 17:05

## 2023-12-17 RX ADMIN — METOCLOPRAMIDE 10 MG: 5 INJECTION, SOLUTION INTRAMUSCULAR; INTRAVENOUS at 18:16

## 2023-12-17 RX ADMIN — ONDANSETRON 4 MG: 2 INJECTION INTRAMUSCULAR; INTRAVENOUS at 23:04

## 2023-12-17 NOTE — OB LABOR/OXYTOCIN SAFETY PROGRESS
Labor Progress Note - Kacey Kern 33 y.o. female MRN: [id number]    Unit/Bed#: -01 Encounter: [id number]       Contraction Frequency (minutes): 3  Contraction Intensity: Moderate/Strong  Uterine Activity Characteristics: Regular  Cervical Dilation: 4-5        Cervical Effacement: 80  Fetal Station: -1  Baseline Rate (FHR): 145 bpm  Fetal Heart Rate (FHT): 145 BPM  FHR Category: 1               Vital Signs:   There were no vitals filed for this visit.    Notes/comments:   -AROM clear fluid   -consider pitocin once contractions space out   -recheck leslee Lopez DO 12/17/2023 6:51 PM

## 2023-12-17 NOTE — ASSESSMENT & PLAN NOTE
She currently smokes about 3 cigarettes/day but denied vaping, marijuana, or other illicit drug use at her initial mfm visit, given persistent nausea and vomiting consider UDS to rule out Hyperemesis secondary to cannabis

## 2023-12-17 NOTE — ANESTHESIA PROCEDURE NOTES
Epidural Block    Patient location during procedure: OB/L&D  Start time: 12/17/2023 6:26 PM  Reason for block: procedure for pain and primary anesthetic  Staffing  Performed by: Silke Oscar CRNA  Authorized by: Roma Dumont MD    Preanesthetic Checklist  Completed: patient identified, IV checked, site marked, risks and benefits discussed, surgical consent, monitors and equipment checked, pre-op evaluation and timeout performed  Epidural  Patient position: sitting  Prep: ChloraPrep  Sedation Level: no sedation  Patient monitoring: heart rate, frequent blood pressure checks, continuous pulse oximetry and cardiac monitor  Approach: midline  Location: lumbar, L3-4  Needle  Needle type: Tuohy   Needle gauge: 17 G  Needle insertion depth: 7 cm  Catheter type: side hole  Catheter size: 19 G  Catheter at skin depth: 13 cm  Catheter securement method: clear occlusive dressing, liquid medical adhesive, stabilization device and tape  Test dose: negativelidocaine-epinephrine (XYLOCAINE-MPF/EPINEPHRINE) 1.5 %-1:200,000 injection 3 mL - Epidural   3 mL - 12/17/2023 6:27:00 PM  Assessment  Sensory level: T10  Number of attempts: 2negative aspiration for CSF, negative aspiration for heme and no paresthesia on injection  patient tolerated the procedure well with no immediate complications

## 2023-12-17 NOTE — ASSESSMENT & PLAN NOTE
She has a past medical history significant for opioid abuse, having most recently used 3 months prior to pregnancy.  She has declined opioid replacement therapy with buprenorphine

## 2023-12-17 NOTE — ASSESSMENT & PLAN NOTE
"First noted in 2019    EKG 12/20/2023: Sinus bradycardia at 59 with sinus arrhythmia, incomplete right bundle branch block. Normal axis. No QT prolongation. T wave inversions in lead III.  Unchanged from prior EKG in April 2020.    Per Cardiology consult : \"Chest pain: Noncardiac, most likely secondary to nausea and vomiting, negative ECG for acute ischemic changes, negative enzymes . Prior history of junctional rhythm [id number] in the setting of nausea and vomiting-likely in the setting of increased vagal tone, no symptoms at baseline, no further evaluation in this regard, this was never associated with hemodynamic compromise\"  Echo 12/22- LVEF 55%, mild mitral valve regurg, mild tricuspid regurg    "

## 2023-12-17 NOTE — H&P
H & P- Obstetrics   Kacey Kern 33 y.o. female MRN: [id number]  Unit/Bed#:  201-01 Encounter: [id number]    Assessment: 33 y.o.  at 40w3d admitted for labor .  SVE: Cervical Dilation: 4  Cervical Effacement: 70  Fetal Station: -2  Method: Manual  OB Examiner: Moi  FHT:  Clinical EFW: 7 ; Cephalic confirmed by ultrasound  GBS status: neg   Postpartum contraception plan: depo    Plan:   History of surgical biopsy  Assessment & Plan  History of open appendectomy and supraumbilical hernia repair w/ mesh    40 weeks gestation of pregnancy  Assessment & Plan  -Prenatal labs completed  -28 week labs completed  -EFW 63%    Tobacco smoking affecting pregnancy in first trimester  Assessment & Plan  She currently smokes about 3 cigarettes/day but denied vaping, marijuana, or other illicit drug use at her initial [facility] visit    History of opioid abuse (HCC)  Assessment & Plan  She has a past medical history significant for opioid abuse, having most recently used 3 months prior to pregnancy.  She has declined opioid replacement therapy with buprenorphine    Incomplete right bundle branch block  Assessment & Plan  First noted in 2019  Asymptomatic in pregnancy    Bipolar depression (HCC)  Assessment & Plan  Diagnosed after previous pregnancy          Discussed case and plan w/ Dr. Lopez      Chief Complaint: contractions    HPI: Kacey Kern is a 33 y.o.  with an BERE of 2023, by Ultrasound at 40w3d who is being admitted for labor . She complains of uterine contractions, occurring every 3 minutes, has no LOF, and reports no VB. She states she has felt good FM.    Patient Active Problem List   Diagnosis    Bipolar depression (HCC)    Nicotine dependence    GERD (gastroesophageal reflux disease)    Low TSH level    Cardiac arrhythmia    Bigeminy    Accelerated atrioventricular junctional rhythm    Incomplete right bundle branch block    Felon of finger    History of opioid abuse (HCC)    Tobacco smoking  affecting pregnancy in first trimester    Excessive weight gain during pregnancy in second trimester    40 weeks gestation of pregnancy    History of surgical biopsy       Baby complications/comments: none    Review of Systems   Constitutional:  Negative for chills and fever.   Respiratory:  Negative for cough and shortness of breath.    Cardiovascular:  Negative for chest pain and leg swelling.   Gastrointestinal:  Negative for abdominal pain, nausea and vomiting.   Genitourinary:  Negative for dysuria, pelvic pain, urgency, vaginal bleeding and vaginal discharge.   Neurological:  Negative for dizziness, light-headedness and headaches.   All other systems reviewed and are negative.      OB Hx:  OB History    Para Term  AB Living   2 1 1 0 0 1   SAB IAB Ectopic Multiple Live Births   0 0 0 0 1      # Outcome Date GA Lbr Uday/2nd Weight Sex Delivery Anes PTL Lv   2 Current            1 Term 13 40w0d  3175 g (7 lb) M Vag-Spont  N HUEY       Past Medical Hx:  Past Medical History:   Diagnosis Date    Bacterial vaginosis     Bipolar depression (HCC)     Condyloma acuminata     Gastritis     GERD (gastroesophageal reflux disease)     H/O ETOH abuse     Heroin abuse (HCC)     History of cocaine abuse (HCC)     Narcotic abuse ([facility])     Nephrolithiasis     Opioid withdrawal ([facility])     Suicidal ideations     Trichomonal vaginitis 2019    Vulvitis        Past Surgical hx:  Past Surgical History:   Procedure Laterality Date    APPENDECTOMY      HERNIA REPAIR      KNEE SURGERY      URETERAL STENT PLACEMENT         Social Hx:  Alcohol use: no  Tobacco use: 3 cigs/ day  Other substance use: hx opioid use    No Known Allergies    Medications Prior to Admission   Medication    Prenatal Vit-Fe Fumarate-FA (PRENATAL VITAMIN PO)       Objective:     There is no height or weight on file to calculate BMI.     Physical Exam:  Physical Exam  Constitutional:       Appearance: Normal appearance.   Cardiovascular:       Rate and Rhythm: Normal rate and regular rhythm.      Heart sounds: No murmur heard.     No friction rub. No gallop.   Pulmonary:      Effort: Pulmonary effort is normal. No respiratory distress.      Breath sounds: No wheezing.   Abdominal:      Palpations: Abdomen is soft.      Tenderness: There is no abdominal tenderness.   Musculoskeletal:         General: No swelling or tenderness.   Neurological:      Mental Status: She is alert and oriented to person, place, and time.   Skin:     General: Skin is warm and dry.   Psychiatric:         Mood and Affect: Mood normal.   Vitals reviewed.            FHT:       TOCO:        Lab Results   Component Value Date    WBC 13.08 (H) 10/04/2023    HGB 11.5 10/04/2023    HCT 33.5 (L) 10/04/2023     10/04/2023     Lab Results   Component Value Date     12/28/2015    K 3.8 08/28/2023     08/28/2023    CO2 23 08/28/2023    BUN 7 08/28/2023    CREATININE 0.61 08/28/2023    GLUCOSE 100 12/28/2015    AST 10 (L) 08/28/2023    ALT 9 08/28/2023     Prenatal Labs: Reviewed      Bloodtype:AB  Rh Factor (no units)   Date Value   05/31/2023 Positive     Strep Grp B PCR (no units)   Date Value   11/20/2023 Negative     HIV NR    Rubella IgG Quant (IU/mL)   Date Value   05/31/2023 <10.0   RPR NR  RPR (no units)   Date Value   07/14/2015 Nonreactive     Hepatitis B Surface Ag (no units)   Date Value   05/31/2023 Non-reactive     Chlamydia, DNA Probe (no units)   Date Value   02/22/2016 C. trachomatis Amplified DNA Negative     Chlamydia trachomatis, DNA Probe (no units)   Date Value   06/06/2023 Negative     N gonorrhoeae, DNA Probe (no units)   Date Value   06/06/2023 Negative   02/22/2016 N. gonorrhoeae Amplified DNA Negative     Glucose (mg/dL)   Date Value   10/04/2023 137 (H)     GLUCOSE 3 HOUR: nml  Platelets   Date Value   10/04/2023 152 Thousands/uL   08/28/2023 171 Thousands/uL   05/31/2023 150 Thousands/uL   05/18/2021 231 Thousands/uL   12/28/2015 210 Thousand/uL    12/11/2015 161 Thousand/uL   09/14/2015 171 Thousand/uL   09/13/2015 167 Thousand/uL     Hemoglobin (g/dL)   Date Value   10/04/2023 11.5   08/28/2023 12.2   05/31/2023 11.9   05/18/2021 13.9   12/28/2015 14.2   12/11/2015 11.6   09/14/2015 13.0   09/13/2015 13.1     >2 Midnights  INPATIENT       Signature/Title: Kasey Prater MD  Date: 12/17/2023  Time: 5:30 PM

## 2023-12-17 NOTE — TELEPHONE ENCOUNTER
"Reason for Disposition  • [1] History of prior delivery (multipara) AND [2] contractions > 10 minutes, or for < 1 hour    Answer Assessment - Initial Assessment Questions  1. ONSET: \"When did the symptoms begin?\"         This morning at 6 am  2. CONTRACTIONS: \"Describe the contractions that you are having.\" (e.g., duration, frequency, regularity, severity)      Consistent, every 10 minutes for 30-60 seconds  3. BERE: \"What date are you expecting to deliver?\"      12/14/2023  4. PARITY: \"Have you had a baby before?\" If Yes, ask: \"How long did the labor last?\"      Second pregnancy  5. FETAL MOVEMENT: \"Has the baby's movement decreased or changed significantly from normal?\"      Same movement as usual  6. OTHER SYMPTOMS: \"Do you have any other symptoms?\" (e.g., leaking fluid from vagina, vaginal bleeding, fever, hand/facial swelling)      denies    Protocols used: Pregnancy - Labor-ADULT-AH    "

## 2023-12-17 NOTE — TELEPHONE ENCOUNTER
"Regarding: Possible labor  ----- Message from Dianne Galeana sent at 12/17/2023  1:56 PM EST -----  Pt called \"I may be going into labor. I am having contractions.\"    "

## 2023-12-17 NOTE — ANESTHESIA PREPROCEDURE EVALUATION
Procedure:  LABOR ANALGESIA    Relevant Problems   CARDIO   (+) Bigeminy   (+) Cardiac arrhythmia   (+) Incomplete right bundle branch block      GI/HEPATIC   (+) GERD (gastroesophageal reflux disease)      GYN   (+) 40 weeks gestation of pregnancy        Physical Exam    Airway       Dental       Cardiovascular      Pulmonary      Other Findings  post-pubertal.      Anesthesia Plan  ASA Score- 2     Anesthesia Type- epidural with ASA Monitors.         Additional Monitors:     Airway Plan:            Plan Factors-    Chart reviewed.   Existing labs reviewed. Patient summary reviewed.                  Induction-     Postoperative Plan-     Informed Consent- Anesthetic plan and risks discussed with patient.  I personally reviewed this patient with the CRNA. Discussed and agreed on the Anesthesia Plan with the CRNA..

## 2023-12-18 PROBLEM — O47.9 UTERINE CONTRACTIONS: Status: RESOLVED | Noted: 2023-12-17 | Resolved: 2023-12-18

## 2023-12-18 PROBLEM — Z98.891 STATUS POST PRIMARY LOW TRANSVERSE CESAREAN SECTION: Status: ACTIVE | Noted: 2023-12-18

## 2023-12-18 PROBLEM — O26.02 EXCESSIVE WEIGHT GAIN DURING PREGNANCY IN SECOND TRIMESTER: Status: RESOLVED | Noted: 2023-09-25 | Resolved: 2023-12-18

## 2023-12-18 PROBLEM — Z3A.40 40 WEEKS GESTATION OF PREGNANCY: Status: RESOLVED | Noted: 2023-12-17 | Resolved: 2023-12-18

## 2023-12-18 LAB
BASE EXCESS BLDCOA CALC-SCNC: -1.2 MMOL/L (ref 3–11)
BASE EXCESS BLDCOV CALC-SCNC: -2.6 MMOL/L (ref 1–9)
ERYTHROCYTE [DISTWIDTH] IN BLOOD BY AUTOMATED COUNT: 14 % (ref 11.6–15.1)
HCO3 BLDCOA-SCNC: 26.1 MMOL/L (ref 17.3–27.3)
HCO3 BLDCOV-SCNC: 23.4 MMOL/L (ref 12.2–28.6)
HCT VFR BLD AUTO: 26.9 % (ref 34.8–46.1)
HGB BLD-MCNC: 9.1 G/DL (ref 11.5–15.4)
MCH RBC QN AUTO: 30.2 PG (ref 26.8–34.3)
MCHC RBC AUTO-ENTMCNC: 33.2 G/DL (ref 31.4–37.4)
MCV RBC AUTO: 91 FL (ref 82–98)
OXYHGB MFR BLDCOA: 9.7 %
OXYHGB MFR BLDCOV: 34.1 %
PCO2 BLDCOA: 54.3 MM[HG] (ref 30–60)
PCO2 BLDCOV: 45.1 MM HG (ref 27–43)
PH BLDCOA: 7.3 [PH] (ref 7.23–7.43)
PH BLDCOV: 7.33 [PH] (ref 7.19–7.49)
PLATELET # BLD AUTO: 99 THOUSANDS/UL (ref 149–390)
PMV BLD AUTO: 14.9 FL (ref 8.9–12.7)
PO2 BLDCOA: <10 MM HG (ref 5–25)
PO2 BLDCOV: 18.3 MM HG (ref 15–45)
RBC # BLD AUTO: 2.95 MILLION/UL (ref 3.81–5.12)
SAO2 % BLDCOV: 7.2 ML/DL
TREPONEMA PALLIDUM IGG+IGM AB [PRESENCE] IN SERUM OR PLASMA BY IMMUNOASSAY: NORMAL
WBC # BLD AUTO: 17.76 THOUSAND/UL (ref 4.31–10.16)

## 2023-12-18 PROCEDURE — NC001 PR NO CHARGE: Performed by: STUDENT IN AN ORGANIZED HEALTH CARE EDUCATION/TRAINING PROGRAM

## 2023-12-18 PROCEDURE — 04LF0ZU OCCLUSION OF LEFT UTERINE ARTERY, OPEN APPROACH: ICD-10-PCS | Performed by: STUDENT IN AN ORGANIZED HEALTH CARE EDUCATION/TRAINING PROGRAM

## 2023-12-18 PROCEDURE — 0UQ90ZZ REPAIR UTERUS, OPEN APPROACH: ICD-10-PCS | Performed by: STUDENT IN AN ORGANIZED HEALTH CARE EDUCATION/TRAINING PROGRAM

## 2023-12-18 PROCEDURE — 85027 COMPLETE CBC AUTOMATED: CPT

## 2023-12-18 PROCEDURE — 59514 CESAREAN DELIVERY ONLY: CPT | Performed by: STUDENT IN AN ORGANIZED HEALTH CARE EDUCATION/TRAINING PROGRAM

## 2023-12-18 PROCEDURE — 94762 N-INVAS EAR/PLS OXIMTRY CONT: CPT

## 2023-12-18 PROCEDURE — 88307 TISSUE EXAM BY PATHOLOGIST: CPT | Performed by: PATHOLOGY

## 2023-12-18 PROCEDURE — 94760 N-INVAS EAR/PLS OXIMETRY 1: CPT

## 2023-12-18 PROCEDURE — 82805 BLOOD GASES W/O2 SATURATION: CPT | Performed by: STUDENT IN AN ORGANIZED HEALTH CARE EDUCATION/TRAINING PROGRAM

## 2023-12-18 RX ORDER — SIMETHICONE 80 MG
80 TABLET,CHEWABLE ORAL EVERY 6 HOURS PRN
Status: DISCONTINUED | OUTPATIENT
Start: 2023-12-18 | End: 2023-12-23 | Stop reason: HOSPADM

## 2023-12-18 RX ORDER — PHENYLEPHRINE HCL IN 0.9% NACL 1 MG/10 ML
SYRINGE (ML) INTRAVENOUS AS NEEDED
Status: DISCONTINUED | OUTPATIENT
Start: 2023-12-18 | End: 2023-12-18

## 2023-12-18 RX ORDER — FENTANYL CITRATE/PF 50 MCG/ML
25 SYRINGE (ML) INJECTION
Status: DISCONTINUED | OUTPATIENT
Start: 2023-12-18 | End: 2023-12-20

## 2023-12-18 RX ORDER — BENZOCAINE/MENTHOL 6 MG-10 MG
1 LOZENGE MUCOUS MEMBRANE DAILY PRN
Status: DISCONTINUED | OUTPATIENT
Start: 2023-12-18 | End: 2023-12-23 | Stop reason: HOSPADM

## 2023-12-18 RX ORDER — ONDANSETRON 2 MG/ML
4 INJECTION INTRAMUSCULAR; INTRAVENOUS EVERY 6 HOURS PRN
Status: ACTIVE | OUTPATIENT
Start: 2023-12-18 | End: 2023-12-19

## 2023-12-18 RX ORDER — ENOXAPARIN SODIUM 100 MG/ML
40 INJECTION SUBCUTANEOUS DAILY
Status: DISCONTINUED | OUTPATIENT
Start: 2023-12-18 | End: 2023-12-22

## 2023-12-18 RX ORDER — FAMOTIDINE 20 MG/1
20 TABLET, FILM COATED ORAL 2 TIMES DAILY
Status: DISCONTINUED | OUTPATIENT
Start: 2023-12-18 | End: 2023-12-23 | Stop reason: HOSPADM

## 2023-12-18 RX ORDER — ONDANSETRON 2 MG/ML
INJECTION INTRAMUSCULAR; INTRAVENOUS AS NEEDED
Status: DISCONTINUED | OUTPATIENT
Start: 2023-12-18 | End: 2023-12-18

## 2023-12-18 RX ORDER — ACETAMINOPHEN 325 MG/1
650 TABLET ORAL EVERY 6 HOURS
Status: DISCONTINUED | OUTPATIENT
Start: 2023-12-18 | End: 2023-12-20

## 2023-12-18 RX ORDER — DOCUSATE SODIUM 100 MG/1
100 CAPSULE, LIQUID FILLED ORAL 2 TIMES DAILY
Status: DISCONTINUED | OUTPATIENT
Start: 2023-12-18 | End: 2023-12-23 | Stop reason: HOSPADM

## 2023-12-18 RX ORDER — NALOXONE HYDROCHLORIDE 0.4 MG/ML
0.1 INJECTION, SOLUTION INTRAMUSCULAR; INTRAVENOUS; SUBCUTANEOUS
Status: ACTIVE | OUTPATIENT
Start: 2023-12-18 | End: 2023-12-19

## 2023-12-18 RX ORDER — DEXAMETHASONE SODIUM PHOSPHATE 10 MG/ML
INJECTION, SOLUTION INTRAMUSCULAR; INTRAVENOUS AS NEEDED
Status: DISCONTINUED | OUTPATIENT
Start: 2023-12-18 | End: 2023-12-18

## 2023-12-18 RX ORDER — NALBUPHINE HYDROCHLORIDE 10 MG/ML
5 INJECTION, SOLUTION INTRAMUSCULAR; INTRAVENOUS; SUBCUTANEOUS
Status: DISPENSED | OUTPATIENT
Start: 2023-12-18 | End: 2023-12-19

## 2023-12-18 RX ORDER — HYDROMORPHONE HCL/PF 1 MG/ML
0.5 SYRINGE (ML) INJECTION
Status: DISCONTINUED | OUTPATIENT
Start: 2023-12-18 | End: 2023-12-20

## 2023-12-18 RX ORDER — KETOROLAC TROMETHAMINE 30 MG/ML
30 INJECTION, SOLUTION INTRAMUSCULAR; INTRAVENOUS EVERY 6 HOURS
Status: COMPLETED | OUTPATIENT
Start: 2023-12-18 | End: 2023-12-19

## 2023-12-18 RX ORDER — DIPHENHYDRAMINE HCL 25 MG
25 TABLET ORAL EVERY 6 HOURS PRN
Status: DISCONTINUED | OUTPATIENT
Start: 2023-12-18 | End: 2023-12-23 | Stop reason: HOSPADM

## 2023-12-18 RX ORDER — LIDOCAINE HCL/EPINEPHRINE/PF 2%-1:200K
VIAL (ML) INJECTION AS NEEDED
Status: DISCONTINUED | OUTPATIENT
Start: 2023-12-18 | End: 2023-12-18

## 2023-12-18 RX ORDER — OXYTOCIN/RINGER'S LACTATE 30/500 ML
62.5 PLASTIC BAG, INJECTION (ML) INTRAVENOUS ONCE
Status: COMPLETED | OUTPATIENT
Start: 2023-12-18 | End: 2023-12-18

## 2023-12-18 RX ORDER — MORPHINE SULFATE 0.5 MG/ML
INJECTION, SOLUTION EPIDURAL; INTRATHECAL; INTRAVENOUS AS NEEDED
Status: DISCONTINUED | OUTPATIENT
Start: 2023-12-18 | End: 2023-12-18

## 2023-12-18 RX ORDER — KETAMINE HCL IN NACL, ISO-OSM 100MG/10ML
SYRINGE (ML) INJECTION AS NEEDED
Status: DISCONTINUED | OUTPATIENT
Start: 2023-12-18 | End: 2023-12-18

## 2023-12-18 RX ORDER — TRANEXAMIC ACID 10 MG/ML
INJECTION, SOLUTION INTRAVENOUS AS NEEDED
Status: DISCONTINUED | OUTPATIENT
Start: 2023-12-18 | End: 2023-12-18

## 2023-12-18 RX ORDER — HYDROMORPHONE HCL/PF 1 MG/ML
0.5 SYRINGE (ML) INJECTION EVERY 2 HOUR PRN
Status: DISCONTINUED | OUTPATIENT
Start: 2023-12-18 | End: 2023-12-20

## 2023-12-18 RX ORDER — MEDROXYPROGESTERONE ACETATE 150 MG/ML
150 INJECTION, SUSPENSION INTRAMUSCULAR ONCE
Status: COMPLETED | OUTPATIENT
Start: 2023-12-20 | End: 2023-12-23

## 2023-12-18 RX ORDER — OXYTOCIN/RINGER'S LACTATE 30/500 ML
PLASTIC BAG, INJECTION (ML) INTRAVENOUS CONTINUOUS PRN
Status: DISCONTINUED | OUTPATIENT
Start: 2023-12-18 | End: 2023-12-18

## 2023-12-18 RX ORDER — METOCLOPRAMIDE HYDROCHLORIDE 5 MG/ML
INJECTION INTRAMUSCULAR; INTRAVENOUS AS NEEDED
Status: DISCONTINUED | OUTPATIENT
Start: 2023-12-18 | End: 2023-12-18

## 2023-12-18 RX ORDER — IBUPROFEN 600 MG/1
600 TABLET ORAL EVERY 6 HOURS
Status: DISCONTINUED | OUTPATIENT
Start: 2023-12-19 | End: 2023-12-20

## 2023-12-18 RX ORDER — PROPOFOL 10 MG/ML
INJECTION, EMULSION INTRAVENOUS AS NEEDED
Status: DISCONTINUED | OUTPATIENT
Start: 2023-12-18 | End: 2023-12-18

## 2023-12-18 RX ORDER — CEFAZOLIN SODIUM 2 G/50ML
2000 SOLUTION INTRAVENOUS ONCE
Status: COMPLETED | OUTPATIENT
Start: 2023-12-18 | End: 2023-12-18

## 2023-12-18 RX ORDER — ACETAMINOPHEN 325 MG/1
650 TABLET ORAL EVERY 6 HOURS SCHEDULED
Status: DISCONTINUED | OUTPATIENT
Start: 2023-12-18 | End: 2023-12-18

## 2023-12-18 RX ORDER — SODIUM CHLORIDE, SODIUM LACTATE, POTASSIUM CHLORIDE, CALCIUM CHLORIDE 600; 310; 30; 20 MG/100ML; MG/100ML; MG/100ML; MG/100ML
125 INJECTION, SOLUTION INTRAVENOUS CONTINUOUS
Status: DISCONTINUED | OUTPATIENT
Start: 2023-12-18 | End: 2023-12-23 | Stop reason: HOSPADM

## 2023-12-18 RX ORDER — SODIUM CHLORIDE, SODIUM LACTATE, POTASSIUM CHLORIDE, CALCIUM CHLORIDE 600; 310; 30; 20 MG/100ML; MG/100ML; MG/100ML; MG/100ML
INJECTION, SOLUTION INTRAVENOUS CONTINUOUS PRN
Status: DISCONTINUED | OUTPATIENT
Start: 2023-12-18 | End: 2023-12-18

## 2023-12-18 RX ORDER — CEFAZOLIN SODIUM 2 G/50ML
2000 SOLUTION INTRAVENOUS ONCE
Status: DISCONTINUED | OUTPATIENT
Start: 2023-12-18 | End: 2023-12-18 | Stop reason: SDUPTHER

## 2023-12-18 RX ORDER — KETOROLAC TROMETHAMINE 30 MG/ML
INJECTION, SOLUTION INTRAMUSCULAR; INTRAVENOUS AS NEEDED
Status: DISCONTINUED | OUTPATIENT
Start: 2023-12-18 | End: 2023-12-18

## 2023-12-18 RX ORDER — ONDANSETRON 2 MG/ML
4 INJECTION INTRAMUSCULAR; INTRAVENOUS ONCE AS NEEDED
Status: COMPLETED | OUTPATIENT
Start: 2023-12-18 | End: 2023-12-20

## 2023-12-18 RX ORDER — KETOROLAC TROMETHAMINE 30 MG/ML
30 INJECTION, SOLUTION INTRAMUSCULAR; INTRAVENOUS EVERY 6 HOURS PRN
Status: DISCONTINUED | OUTPATIENT
Start: 2023-12-18 | End: 2023-12-18

## 2023-12-18 RX ADMIN — SODIUM CHLORIDE, SODIUM LACTATE, POTASSIUM CHLORIDE, AND CALCIUM CHLORIDE: .6; .31; .03; .02 INJECTION, SOLUTION INTRAVENOUS at 00:59

## 2023-12-18 RX ADMIN — ENOXAPARIN SODIUM 40 MG: 40 INJECTION SUBCUTANEOUS at 14:27

## 2023-12-18 RX ADMIN — SIMETHICONE 80 MG: 80 TABLET, CHEWABLE ORAL at 14:27

## 2023-12-18 RX ADMIN — PROPOFOL 50 MCG/KG/MIN: 10 INJECTION, EMULSION INTRAVENOUS at 02:07

## 2023-12-18 RX ADMIN — SODIUM CHLORIDE, POTASSIUM CHLORIDE, SODIUM LACTATE AND CALCIUM CHLORIDE 125 ML/HR: 600; 310; 30; 20 INJECTION, SOLUTION INTRAVENOUS at 09:06

## 2023-12-18 RX ADMIN — Medication 100 MCG: at 01:40

## 2023-12-18 RX ADMIN — ACETAMINOPHEN 650 MG: 325 TABLET, FILM COATED ORAL at 11:35

## 2023-12-18 RX ADMIN — Medication 10 MG: at 02:18

## 2023-12-18 RX ADMIN — LIDOCAINE HYDROCHLORIDE AND EPINEPHRINE 5 ML: 20; 5 INJECTION, SOLUTION EPIDURAL; INFILTRATION; INTRACAUDAL; PERINEURAL at 00:59

## 2023-12-18 RX ADMIN — Medication 62.5 MILLI-UNITS/MIN: at 03:36

## 2023-12-18 RX ADMIN — Medication 500 MG: at 01:07

## 2023-12-18 RX ADMIN — Medication 100 MCG: at 01:45

## 2023-12-18 RX ADMIN — SODIUM CHLORIDE, POTASSIUM CHLORIDE, SODIUM LACTATE AND CALCIUM CHLORIDE 125 ML/HR: 600; 310; 30; 20 INJECTION, SOLUTION INTRAVENOUS at 03:36

## 2023-12-18 RX ADMIN — KETOROLAC TROMETHAMINE 30 MG: 30 INJECTION, SOLUTION INTRAMUSCULAR; INTRAVENOUS at 02:24

## 2023-12-18 RX ADMIN — CEFAZOLIN SODIUM 2000 MG: 2 SOLUTION INTRAVENOUS at 01:03

## 2023-12-18 RX ADMIN — NALBUPHINE HYDROCHLORIDE 5 MG: 10 INJECTION, SOLUTION INTRAMUSCULAR; INTRAVENOUS; SUBCUTANEOUS at 08:15

## 2023-12-18 RX ADMIN — Medication 100 MCG: at 01:57

## 2023-12-18 RX ADMIN — KETOROLAC TROMETHAMINE 30 MG: 30 INJECTION, SOLUTION INTRAMUSCULAR; INTRAVENOUS at 20:21

## 2023-12-18 RX ADMIN — DOCUSATE SODIUM 100 MG: 100 CAPSULE, LIQUID FILLED ORAL at 08:12

## 2023-12-18 RX ADMIN — METOCLOPRAMIDE 10 MG: 5 INJECTION, SOLUTION INTRAMUSCULAR; INTRAVENOUS at 01:55

## 2023-12-18 RX ADMIN — PROPOFOL 40 MG: 10 INJECTION, EMULSION INTRAVENOUS at 02:06

## 2023-12-18 RX ADMIN — PHENYLEPHRINE HYDROCHLORIDE 50 MCG/MIN: 10 INJECTION INTRAVENOUS at 01:01

## 2023-12-18 RX ADMIN — KETOROLAC TROMETHAMINE 30 MG: 30 INJECTION, SOLUTION INTRAMUSCULAR; INTRAVENOUS at 08:12

## 2023-12-18 RX ADMIN — Medication 250 MILLI-UNITS/MIN: at 01:22

## 2023-12-18 RX ADMIN — IRON SUCROSE 200 MG: 20 INJECTION, SOLUTION INTRAVENOUS at 18:06

## 2023-12-18 RX ADMIN — ACETAMINOPHEN 650 MG: 325 TABLET, FILM COATED ORAL at 04:30

## 2023-12-18 RX ADMIN — ONDANSETRON 4 MG: 2 INJECTION INTRAMUSCULAR; INTRAVENOUS at 01:05

## 2023-12-18 RX ADMIN — ACETAMINOPHEN 650 MG: 325 TABLET, FILM COATED ORAL at 17:19

## 2023-12-18 RX ADMIN — PROPOFOL 40 MG: 10 INJECTION, EMULSION INTRAVENOUS at 02:08

## 2023-12-18 RX ADMIN — ROPIVACAINE HYDROCHLORIDE: 2 INJECTION, SOLUTION EPIDURAL; INFILTRATION at 00:27

## 2023-12-18 RX ADMIN — TRANEXAMIC ACID 1000 MG: 10 INJECTION, SOLUTION INTRAVENOUS at 02:09

## 2023-12-18 RX ADMIN — DEXAMETHASONE SODIUM PHOSPHATE 10 MG: 10 INJECTION, SOLUTION INTRAMUSCULAR; INTRAVENOUS at 01:05

## 2023-12-18 RX ADMIN — LIDOCAINE HYDROCHLORIDE AND EPINEPHRINE 5 ML: 20; 5 INJECTION, SOLUTION EPIDURAL; INFILTRATION; INTRACAUDAL; PERINEURAL at 01:07

## 2023-12-18 RX ADMIN — AMPICILLIN AND SULBACTAM 3 G: 100; 50 INJECTION, POWDER, FOR SOLUTION INTRAVENOUS at 05:55

## 2023-12-18 RX ADMIN — LIDOCAINE HYDROCHLORIDE AND EPINEPHRINE 5 ML: 20; 5 INJECTION, SOLUTION EPIDURAL; INFILTRATION; INTRACAUDAL; PERINEURAL at 01:03

## 2023-12-18 RX ADMIN — KETOROLAC TROMETHAMINE 30 MG: 30 INJECTION, SOLUTION INTRAMUSCULAR; INTRAVENOUS at 14:27

## 2023-12-18 RX ADMIN — DOCUSATE SODIUM 100 MG: 100 CAPSULE, LIQUID FILLED ORAL at 17:19

## 2023-12-18 RX ADMIN — MORPHINE SULFATE 3 MG: 0.5 INJECTION, SOLUTION EPIDURAL; INTRATHECAL; INTRAVENOUS at 02:14

## 2023-12-18 NOTE — OB LABOR/OXYTOCIN SAFETY PROGRESS
Labor Progress Note - Kacey Kern 33 y.o. female MRN: [id number]    Unit/Bed#: -01 Encounter: [id number]       Contraction Frequency (minutes): 2-3  Contraction Intensity: Moderate/Strong  Uterine Activity Characteristics: Regular  Cervical Dilation: 10  Dilation Complete Date: 12/17/23  Dilation Complete Time: 2203  Cervical Effacement: 100  Fetal Station: 1  Baseline Rate (FHR): 145 bpm  Fetal Heart Rate (FHT): 149 BPM  FHR Category: II               Vital Signs:   Vitals:    12/17/23 2130   BP: 109/55   Pulse:    Resp:    Temp: 98.2 °F (36.8 °C)   SpO2:        Notes/comments:   Patient with 3-minute deceleration.  SVE as above.  Return to baseline with repositioning.  Dr. John donald.  Currently category 2 tracing with recurrent late decelerations.  Moderate variability present.    Kelli Ortez MD 12/17/2023 10:10 PM

## 2023-12-18 NOTE — OB LABOR/OXYTOCIN SAFETY PROGRESS
Labor Progress Note - Kacey Kern 33 y.o. female MRN: [id number]    Unit/Bed#: -01 Encounter: [id number]       Contraction Frequency (minutes): 4  Contraction Intensity: Moderate/Strong  Uterine Activity Characteristics: Regular  Cervical Dilation: 6        Cervical Effacement: 80  Fetal Station: -1  Baseline Rate (FHR): 125 bpm  Fetal Heart Rate (FHT): 125 BPM  FHR Category: 2             Vital Signs:   Vitals:    12/17/23 1907   BP: 125/58   Pulse: 68   SpO2: 100%       Notes/comments:   -maternal position change with return to baseline   -continue to monitor, recheck leslee Lopez DO 12/17/2023 8:44 PM

## 2023-12-18 NOTE — PLAN OF CARE
Problem: PAIN - ADULT  Goal: Verbalizes/displays adequate comfort level or baseline comfort level  Description: Interventions:  - Encourage patient to monitor pain and request assistance  - Assess pain using appropriate pain scale  - Administer analgesics based on type and severity of pain and evaluate response  - Implement non-pharmacological measures as appropriate and evaluate response  - Consider cultural and social influences on pain and pain management  - Notify physician/advanced practitioner if interventions unsuccessful or patient reports new pain  Outcome: Progressing     Problem: INFECTION - ADULT  Goal: Absence or prevention of progression during hospitalization  Description: INTERVENTIONS:  - Assess and monitor for signs and symptoms of infection  - Monitor lab/diagnostic results  - Monitor all insertion sites, i.e. indwelling lines, tubes, and drains  - Monitor endotracheal if appropriate and nasal secretions for changes in amount and color  - [place] appropriate cooling/warming therapies per order  - Administer medications as ordered  - Instruct and encourage patient and family to use good hand hygiene technique  - Identify and instruct in appropriate isolation precautions for identified infection/condition  Outcome: Progressing  Goal: Absence of fever/infection during neutropenic period  Description: INTERVENTIONS:  - Monitor WBC    Outcome: Progressing     Problem: SAFETY ADULT  Goal: Patient will remain free of falls  Description: INTERVENTIONS:  - Educate patient/family on patient safety including physical limitations  - Instruct patient to call for assistance with activity   - Consult OT/PT to assist with strengthening/mobility   - Keep Call bell within reach  - Keep bed low and locked with side rails adjusted as appropriate  - Keep care items and personal belongings within reach  - Initiate and maintain comfort rounds  - Make Fall Risk Sign visible to staff  - Offer Toileting every  Hours,  in advance of need  - Initiate/Maintain alarm  - Obtain necessary fall risk management equipment:   - Apply yellow socks and bracelet for high fall risk patients  - Consider moving patient to room near nurses station  Outcome: Progressing  Goal: Maintain or return to baseline ADL function  Description: INTERVENTIONS:  -  Assess patient's ability to carry out ADLs; assess patient's baseline for ADL function and identify physical deficits which impact ability to perform ADLs (bathing, care of mouth/teeth, toileting, grooming, dressing, etc.)  - Assess/evaluate cause of self-care deficits   - Assess range of motion  - Assess patient's mobility; develop plan if impaired  - Assess patient's need for assistive devices and provide as appropriate  - Encourage maximum independence but intervene and supervise when necessary  - Involve family in performance of ADLs  - Assess for home care needs following discharge   - Consider OT consult to assist with ADL evaluation and planning for discharge  - Provide patient education as appropriate  Outcome: Progressing  Goal: Maintains/Returns to pre admission functional level  Description: INTERVENTIONS:  - Perform AM-PAC 6 Click Basic Mobility/ Daily Activity assessment daily.  - Set and communicate daily mobility goal to care team and patient/family/caregiver.   - Collaborate with rehabilitation services on mobility goals if consulted  - Perform Range of Motion  times a day.  - Reposition patient every  hours.  - Dangle patient  times a day  - Stand patient  times a day  - Ambulate patient  times a day  - Out of bed to chair  times a day   - Out of bed for meals times a day  - Out of bed for toileting  - Record patient progress and toleration of activity level   Outcome: Progressing     Problem: Knowledge Deficit  Goal: Patient/family/caregiver demonstrates understanding of disease process, treatment plan, medications, and discharge instructions  Description: Complete learning  assessment and assess knowledge base.  Interventions:  - Provide teaching at level of understanding  - Provide teaching via preferred learning methods  Outcome: Progressing  Goal: Verbalizes understanding of labor plan  Description: Assess patient/family/caregiver's baseline knowledge level and ability to understand information.  Provide education via patient/family/caregiver's preferred learning method at appropriate level of understanding.     1. Provide teaching at level of understanding.  2. Provide teaching via preferred learning method(s).  Outcome: Progressing     Problem: DISCHARGE PLANNING  Goal: Discharge to home or other facility with appropriate resources  Description: INTERVENTIONS:  - Identify barriers to discharge w/patient and caregiver  - Arrange for needed discharge resources and transportation as appropriate  - Identify discharge learning needs (meds, wound care, etc.)  - Arrange for interpretive services to assist at discharge as needed  - Refer to Case Management Department for coordinating discharge planning if the patient needs post-hospital services based on physician/advanced practitioner order or complex needs related to functional status, cognitive ability, or social support system  Outcome: Progressing     Problem: Labor & Delivery  Goal: Manages discomfort  Description: Assess and monitor for signs and symptoms of discomfort.  Assess patient's pain level regularly and per hospital policy.  Administer medications as ordered. Support use of nonpharmacological methods to help control pain such as distraction, imagery, relaxation, and application of heat and cold.  Collaborate with interdisciplinary team and patient to determine appropriate pain management plan.    1. Include patient in decisions related to comfort.  2. Offer non-pharmacological pain management interventions.  3. Report ineffective pain management to physician.  Outcome: Progressing  Goal: Patient vital signs are  stable  Description: 1. Assess vital signs - vaginal delivery.  Outcome: Progressing     Problem: BIRTH - VAGINAL/ SECTION  Goal: Fetal and maternal status remain reassuring during the birth process  Description: INTERVENTIONS:  - Monitor vital signs  - Monitor fetal heart rate  - Monitor uterine activity  - Monitor labor progression (vaginal delivery)  - DVT prophylaxis  - Antibiotic prophylaxis  Outcome: Progressing  Goal: Emotionally satisfying birthing experience for mother/fetus  Description: Interventions:  - Assess, plan, implement and evaluate the nursing care given to the patient in labor  - Advocate the philosophy that each childbirth experience is a unique experience and support the family's chosen level of involvement and control during the labor process   - Actively participate in both the patient's and family's teaching of the birth process  - Consider cultural, Yarsanism and age-specific factors and plan care for the patient in labor  Outcome: Progressing

## 2023-12-18 NOTE — LACTATION NOTE
This note was copied from a baby's chart.  CONSULT - LACTATION  Baby Girl Kern (Brittany) 0 days female MRN: [id number]    [facility] AN NURSERY Room / Bed: (N)/(N) Encounter: [id number]    Maternal Information     MOTHER:  Kacey Kern  Maternal Age: 33 y.o.   OB History: # 1 - Date: 13, Sex: Male, Weight: 3175 g (7 lb), GA: 40w0d, Delivery: Vaginal, Spontaneous, Apgar1: None, Apgar5: None, Living: Living, Birth Comments: None    # 2 - Date: 23, Sex: Female, Weight: 3605 g (7 lb 15.2 oz), GA: 40w4d, Delivery: , Low Transverse, Apgar1: 8, Apgar5: 9, Living: Living, Birth Comments: None   Previouse breast reduction surgery? No    Lactation history:   Has patient previously breast fed: No   How long had patient previously breast fed:     Previous breast feeding complications:       Past Surgical History:   Procedure Laterality Date    APPENDECTOMY      HERNIA REPAIR      KNEE SURGERY      URETERAL STENT PLACEMENT          Birth information:  YOB: 2024   Time of birth: 1:21 AM   Sex: female   Delivery type: , Low Transverse   Birth Weight: 3605 g (7 lb 15.2 oz)   Percent of Weight Change: 0%     Gestational Age: 40w4d   [unfilled]    Assessment     Breast and nipple assessment: normal assessment     Assessment:  Did not perform digital oral assessment    Feeding assessment: no latch  LATCH:  Latch:     Audible Swallowing:     Type of Nipple:     Comfort (Breast/Nipple):     Hold (Positioning):     LATCH Score:         LATCH Documentation   Having latch problems? No   Breasts/Nipples   Date Pumping Initiated 23   Time Pumping Initiated 1310   Left Breast Soft   Right Breast Soft   Left Nipple Everted   Right Nipple Everted   Intervention Lanolin;Hand expression   Breastfeeding Status Yes   Breastfeeding Progress Pumping only;Not yet established   Other OB Lactation Tools   Feeding Devices Bottle;Lanolin;Pump    Breast Pump   Pump 2   Patient Follow-Up   Lactation Consult Status 2   Follow-Up Type Inpatient   Other OB Lactation Documentation    Additional Problem Noted Attempted baby at the breast, baby recently had 30mL of Sim and too sleepy to latch. Reviewed RSB &DC book info. Set mother up with electric and hand pump.  (Ordering Zomee)     Feeding recommendations:   Instructed mother to offer breast every 2-3 hours and if no latch pump and offer bottle via paced bottle feeding.   Mother did not have baby in the room until 0900, mom is unsure of feeding plan and discussed pumping, breastfeeding and formula feeding. Baby has only received formula until consult, baby fed 30 minutes prior to consult with 30mL of formula. Mother expresses uncertainty if she has milk. HE demonstrated, mother hesitant but performed teach-back with both breast. Baby brought s2s and attempted to latch on left breast in the laid back position, but baby was sleepy and spit up. Verbal education provided to mother about paced bottle feeding, feeding amounts, and safe formula practices.   Mother set up with breast pump and educated on frequent stimulation with electric and hand pump. Reviewed RSB & DC booklet.   Provided mother with handouts. Ordering Zomee pump to case management.     Reviewed importance of S2S with mother and father, left baby in S2S with mom.   Handouts:   How to Cycle Hospital Pump,  Alt. Feeding,  Paced bottle,        Nae Zaldivar RN 12/18/2023 1:44 PM

## 2023-12-18 NOTE — PLAN OF CARE
Problem: PAIN - ADULT  Goal: Verbalizes/displays adequate comfort level or baseline comfort level  Description: Interventions:  - Encourage patient to monitor pain and request assistance  - Assess pain using appropriate pain scale  - Administer analgesics based on type and severity of pain and evaluate response  - Implement non-pharmacological measures as appropriate and evaluate response  - Consider cultural and social influences on pain and pain management  - Notify physician/advanced practitioner if interventions unsuccessful or patient reports new pain  Outcome: Progressing     Problem: INFECTION - ADULT  Goal: Absence or prevention of progression during hospitalization  Description: INTERVENTIONS:  - Assess and monitor for signs and symptoms of infection  - Monitor lab/diagnostic results  - Monitor all insertion sites, i.e. indwelling lines, tubes, and drains  - Monitor endotracheal if appropriate and nasal secretions for changes in amount and color  - [place] appropriate cooling/warming therapies per order  - Administer medications as ordered  - Instruct and encourage patient and family to use good hand hygiene technique  - Identify and instruct in appropriate isolation precautions for identified infection/condition  Outcome: Progressing  Goal: Absence of fever/infection during neutropenic period  Description: INTERVENTIONS:  - Monitor WBC    Outcome: Progressing     Problem: SAFETY ADULT  Goal: Patient will remain free of falls  Description: INTERVENTIONS:  - Educate patient/family on patient safety including physical limitations  - Instruct patient to call for assistance with activity   - Consult OT/PT to assist with strengthening/mobility   - Keep Call bell within reach  - Keep bed low and locked with side rails adjusted as appropriate  - Keep care items and personal belongings within reach  - Initiate and maintain comfort rounds  - Make Fall Risk Sign visible to staff  - Apply yellow socks and bracelet  for high fall risk patients  - Consider moving patient to room near nurses station  Outcome: Progressing  Goal: Maintain or return to baseline ADL function  Description: INTERVENTIONS:  -  Assess patient's ability to carry out ADLs; assess patient's baseline for ADL function and identify physical deficits which impact ability to perform ADLs (bathing, care of mouth/teeth, toileting, grooming, dressing, etc.)  - Assess/evaluate cause of self-care deficits   - Assess range of motion  - Assess patient's mobility; develop plan if impaired  - Assess patient's need for assistive devices and provide as appropriate  - Encourage maximum independence but intervene and supervise when necessary  - Involve family in performance of ADLs  - Assess for home care needs following discharge   - Consider OT consult to assist with ADL evaluation and planning for discharge  - Provide patient education as appropriate  Outcome: Progressing  Goal: Maintains/Returns to pre admission functional level  Description: INTERVENTIONS:  - Perform AM-PAC 6 Click Basic Mobility/ Daily Activity assessment daily.  - Set and communicate daily mobility goal to care team and patient/family/caregiver.   - Collaborate with rehabilitation services on mobility goals if consulted  - Out of bed for toileting  - Record patient progress and toleration of activity level   Outcome: Progressing     Problem: DISCHARGE PLANNING  Goal: Discharge to home or other facility with appropriate resources  Description: INTERVENTIONS:  - Identify barriers to discharge w/patient and caregiver  - Arrange for needed discharge resources and transportation as appropriate  - Identify discharge learning needs (meds, wound care, etc.)  - Arrange for interpretive services to assist at discharge as needed  - Refer to Case Management Department for coordinating discharge planning if the patient needs post-hospital services based on physician/advanced practitioner order or complex needs  related to functional status, cognitive ability, or social support system  Outcome: Progressing     Problem: POSTPARTUM  Goal: Experiences normal postpartum course  Description: INTERVENTIONS:  - Monitor maternal vital signs  - Assess uterine involution and lochia  Outcome: Progressing  Goal: Appropriate maternal -  bonding  Description: INTERVENTIONS:  - Identify family support  - Assess for appropriate maternal/infant bonding   -Encourage maternal/infant bonding opportunities  - Referral to  or  as needed  Outcome: Progressing  Goal: Establishment of infant feeding pattern  Description: INTERVENTIONS:  - Assess breast/bottle feeding  - Refer to lactation as needed  Outcome: Progressing  Goal: Incision(s), wounds(s) or drain site(s) healing without S/S of infection  Description: INTERVENTIONS  - Assess and document dressing, incision, wound bed, drain sites and surrounding tissue  - Provide patient and family education  Outcome: Progressing

## 2023-12-18 NOTE — PLAN OF CARE
Problem: PAIN - ADULT  Goal: Verbalizes/displays adequate comfort level or baseline comfort level  Description: Interventions:  - Encourage patient to monitor pain and request assistance  - Assess pain using appropriate pain scale  - Administer analgesics based on type and severity of pain and evaluate response  - Implement non-pharmacological measures as appropriate and evaluate response  - Consider cultural and social influences on pain and pain management  - Notify physician/advanced practitioner if interventions unsuccessful or patient reports new pain  12/18/2023 0516 by Tiana Patel RN  Outcome: Progressing  12/17/2023 1956 by Tiana Patel RN  Outcome: Progressing     Problem: INFECTION - ADULT  Goal: Absence or prevention of progression during hospitalization  Description: INTERVENTIONS:  - Assess and monitor for signs and symptoms of infection  - Monitor lab/diagnostic results  - Monitor all insertion sites, i.e. indwelling lines, tubes, and drains  - Monitor endotracheal if appropriate and nasal secretions for changes in amount and color  - [place] appropriate cooling/warming therapies per order  - Administer medications as ordered  - Instruct and encourage patient and family to use good hand hygiene technique  - Identify and instruct in appropriate isolation precautions for identified infection/condition  12/18/2023 0516 by Tiana Patel RN  Outcome: Progressing  12/17/2023 1956 by Tiana Patel RN  Outcome: Progressing  Goal: Absence of fever/infection during neutropenic period  Description: INTERVENTIONS:  - Monitor WBC    12/18/2023 0516 by Tiana Patel RN  Outcome: Progressing  12/17/2023 1956 by Tiana Patel RN  Outcome: Progressing     Problem: SAFETY ADULT  Goal: Patient will remain free of falls  Description: INTERVENTIONS:  - Educate patient/family on patient safety including physical limitations  - Instruct patient to call for assistance with activity   - Consult  OT/PT to assist with strengthening/mobility   - Keep Call bell within reach  - Keep bed low and locked with side rails adjusted as appropriate  - Keep care items and personal belongings within reach  - Initiate and maintain comfort rounds  - Make Fall Risk Sign visible to staff  - Offer Toileting every  Hours, in advance of need  - Initiate/Maintain alarm  - Obtain necessary fall risk management equipment:   - Apply yellow socks and bracelet for high fall risk patients  - Consider moving patient to room near nurses station  12/18/2023 0516 by Tiana Patel RN  Outcome: Progressing  12/17/2023 1956 by Tiana Patel RN  Outcome: Progressing  Goal: Maintain or return to baseline ADL function  Description: INTERVENTIONS:  -  Assess patient's ability to carry out ADLs; assess patient's baseline for ADL function and identify physical deficits which impact ability to perform ADLs (bathing, care of mouth/teeth, toileting, grooming, dressing, etc.)  - Assess/evaluate cause of self-care deficits   - Assess range of motion  - Assess patient's mobility; develop plan if impaired  - Assess patient's need for assistive devices and provide as appropriate  - Encourage maximum independence but intervene and supervise when necessary  - Involve family in performance of ADLs  - Assess for home care needs following discharge   - Consider OT consult to assist with ADL evaluation and planning for discharge  - Provide patient education as appropriate  12/18/2023 0516 by Tiana Patel RN  Outcome: Progressing  12/17/2023 1956 by Tiana Patel RN  Outcome: Progressing  Goal: Maintains/Returns to pre admission functional level  Description: INTERVENTIONS:  - Perform AM-PAC 6 Click Basic Mobility/ Daily Activity assessment daily.  - Set and communicate daily mobility goal to care team and patient/family/caregiver.   - Collaborate with rehabilitation services on mobility goals if consulted  - Perform Range of Motion  times a  day.  - Reposition patient every  hours.  - Dangle patient  times a day  - Stand patient  times a day  - Ambulate patient  times a day  - Out of bed to chair  times a day   - Out of bed for meals  times a day  - Out of bed for toileting  - Record patient progress and toleration of activity level   12/18/2023 0516 by Tiana Patel RN  Outcome: Progressing  12/17/2023 1956 by Tiana Patel RN  Outcome: Progressing     Problem: Knowledge Deficit  Goal: Patient/family/caregiver demonstrates understanding of disease process, treatment plan, medications, and discharge instructions  Description: Complete learning assessment and assess knowledge base.  Interventions:  - Provide teaching at level of understanding  - Provide teaching via preferred learning methods  12/18/2023 0516 by Tiana Patel RN  Outcome: Progressing  12/17/2023 1956 by Tiana Patel RN  Outcome: Progressing  Goal: Verbalizes understanding of labor plan  Description: Assess patient/family/caregiver's baseline knowledge level and ability to understand information.  Provide education via patient/family/caregiver's preferred learning method at appropriate level of understanding.     1. Provide teaching at level of understanding.  2. Provide teaching via preferred learning method(s).  12/18/2023 0516 by Tiana Patel RN  Outcome: Progressing  12/17/2023 1956 by Tiana Patel RN  Outcome: Progressing     Problem: DISCHARGE PLANNING  Goal: Discharge to home or other facility with appropriate resources  Description: INTERVENTIONS:  - Identify barriers to discharge w/patient and caregiver  - Arrange for needed discharge resources and transportation as appropriate  - Identify discharge learning needs (meds, wound care, etc.)  - Arrange for interpretive services to assist at discharge as needed  - Refer to Case Management Department for coordinating discharge planning if the patient needs post-hospital services based on physician/advanced  practitioner order or complex needs related to functional status, cognitive ability, or social support system  2023 by Tiana Patel RN  Outcome: Progressing  2023 by Tiana Patel RN  Outcome: Progressing     Problem: Labor & Delivery  Goal: Manages discomfort  Description: Assess and monitor for signs and symptoms of discomfort.  Assess patient's pain level regularly and per hospital policy.  Administer medications as ordered. Support use of nonpharmacological methods to help control pain such as distraction, imagery, relaxation, and application of heat and cold.  Collaborate with interdisciplinary team and patient to determine appropriate pain management plan.    1. Include patient in decisions related to comfort.  2. Offer non-pharmacological pain management interventions.  3. Report ineffective pain management to physician.  2023 by Tiana Patel RN  Outcome: Progressing  2023 by Tiana Patel RN  Outcome: Progressing  Goal: Patient vital signs are stable  Description: 1. Assess vital signs - vaginal delivery.  2023 by Tiana Patel RN  Outcome: Progressing  2023 by Tiana Patel RN  Outcome: Progressing     Problem: BIRTH - VAGINAL/ SECTION  Goal: Fetal and maternal status remain reassuring during the birth process  Description: INTERVENTIONS:  - Monitor vital signs  - Monitor fetal heart rate  - Monitor uterine activity  - Monitor labor progression (vaginal delivery)  - DVT prophylaxis  - Antibiotic prophylaxis  2023 by Tiana Patel RN  Outcome: Completed  2023 by Tiana Patel RN  Outcome: Progressing  Goal: Emotionally satisfying birthing experience for mother/fetus  Description: Interventions:  - Assess, plan, implement and evaluate the nursing care given to the patient in labor  - Advocate the philosophy that each childbirth experience is a unique experience and support the  family's chosen level of involvement and control during the labor process   - Actively participate in both the patient's and family's teaching of the birth process  - Consider cultural, Latter day and age-specific factors and plan care for the patient in labor  2023 by Tiana Patel RN  Outcome: Completed  2023 by Tiana Patel RN  Outcome: Progressing     Problem: POSTPARTUM  Goal: Experiences normal postpartum course  Description: INTERVENTIONS:  - Monitor maternal vital signs  - Assess uterine involution and lochia  Outcome: Progressing  Goal: Appropriate maternal -  bonding  Description: INTERVENTIONS:  - Identify family support  - Assess for appropriate maternal/infant bonding   -Encourage maternal/infant bonding opportunities  - Referral to  or  as needed  Outcome: Progressing  Goal: Establishment of infant feeding pattern  Description: INTERVENTIONS:  - Assess breast/bottle feeding  - Refer to lactation as needed  Outcome: Progressing  Goal: Incision(s), wounds(s) or drain site(s) healing without S/S of infection  Description: INTERVENTIONS  - Assess and document dressing, incision, wound bed, drain sites and surrounding tissue  - Provide patient and family education  - Perform skin care/dressing changes every   Outcome: Progressing

## 2023-12-18 NOTE — DISCHARGE SUMMARY
Discharge Summary - Kacey Kern 33 y.o. female MRN: [id number]    Unit/Bed#: -01 Encounter: [id number]    ADMISSION  Admission Date: 2023   Admitting Attending: Dr. Ana cabrera. providers found  Admitting Diagnoses:   Pregnancy at 40w3d  Tobacco smoking in pregnancy  History opioid abuse  History of incomplete right bundle branch block  History of bipolar depression    DELIVERY  Delivery Method: , Low Transverse    Delivery Date and Time: 2023  1:21 AM   Delivery Attending: Sharifa Lopez     DISCHARGE  Discharge Date: 23  Discharge Attending: Dr. Nunez  Discharge Diagnosis:   S/p primary low transverse  section  Postpartum hemorrhage  Tobacco smoking in pregnancy  History opioid abuse  History of incomplete right bundle branch block  History of bipolar depression  LETICIA    Clinical course: Admission to Delivery  Kacey Kern is a 33 y.o.  who was admitted at 40w4d for labor. Initial SVE 4/70/-2. Patient received an epidural for analgesia. Amniotomy was completed with clear fluid appreciated. Prolonged 5 minute fetal heart rate deceleration appreciated s/p epidural and amniotomy with resolution to baseline with maternal repositioning and IV fluid bolus. Patient made steady cervical change to complete and began pushing. After pushing >2 hours with fetal position noted to be occiput posterior, safety huddle was completed with patient and obstetric team with decision to move forward with  section for arrest of descent.      Delivery  Route of Delivery: , Low Transverse   Reason for  delivery: Arrest of Descent;Category II FHR Tracing;Cephalopelvic Disproportion     Anesthesia: Epidural ,   QBL: 1666cc  Delivery: , Low Transverse  at 2023  1:21 AM   Baby's Weight: 3605 g (7 lb 15.2 oz) ; 127.16     Apgar scores: 8  and 9  at 1 and 5 minutes, respectively    Delivery was completed with left hysterotomy extension into the cervical  tissue. Postpartum hemorrhage was diagnosed with QBL 1666cc. Left uterine artery ligation was performed during procedure for hemostatic control followed by 1g IV TXA. SurgiFoam was placed within the left hysterotomy repair site for continued hemostasis. See operative report for details.      Clinical Course: Post-Delivery:  The post delivery course was remarkable for nausea, vomiting, and LETICIA.  On POD#2, patient reports intractable nausea and vomiting as well as anxiety attack. An EKG was collected which showed sinus rhythm with RBBB. Cardiology was consulted and performed an echo which showed LVEF 55% and mild mitral and tricuspid regurgitation. A CMP demonstrated creatinine rise from 0.61 to 1.44. Imaging showed left hydronephrosis secondary to obstruction of distal ureter. Urology then ordered CT imaging which demonstrated mild hydroureter down to the enlarged uterus. After discussion with the patient, she elected for cystoscopy with retrograde pyelogram and stent placement.     On POD#5, patient had left ureteral stent placement with urology follow-up outpatient.     On the day of discharge, the patient was ambulating, tolerating oral intake, and hemodynamically stable. She was able to reasonably perform all ADLs. She had appropriate bowel function. Pain was well-controlled. She was discharged home on postpartum/postop day #5. Patient was instructed to follow up with her OB as an outpatient and was given appropriate warnings to call her provider with problems or concerns.    Pertinent lab findings included:   Blood type AB positive.     Last three Hgb values:  Lab Results   Component Value Date    HGB 8.6 (L) 12/23/2023    HGB 9.6 (L) 12/22/2023    HGB 9.1 (L) 12/18/2023        Problem-specific follow-up plans included the following:  Problem List       Bipolar depression (HCC)    Current Assessment & Plan     Diagnosed after previous pregnancy, not on meds         Nicotine dependence    GERD (gastroesophageal  "reflux disease)    Low TSH level    Cardiac arrhythmia    Bigeminy    Accelerated atrioventricular junctional rhythm    Incomplete right bundle branch block    Current Assessment & Plan     First noted in 2019    EKG 12/20/2023: Sinus bradycardia at 59 with sinus arrhythmia, incomplete right bundle branch block. Normal axis. No QT prolongation. T wave inversions in lead III.  Unchanged from prior EKG in April 2020.    Per Cardiology consult : \"Chest pain: Noncardiac, most likely secondary to nausea and vomiting, negative ECG for acute ischemic changes, negative enzymes . Prior history of junctional rhythm [id number] in the setting of nausea and vomiting-likely in the setting of increased vagal tone, no symptoms at baseline, no further evaluation in this regard, this was never associated with hemodynamic compromise\"  Echo 12/22- LVEF 55%, mild mitral valve regurg, mild tricuspid regurg           Felon of finger    History of opioid abuse (HCC)    Overview     She has a past medical history significant for opioid abuse, having most recently used 3 months prior to pregnancy.  She has declined opioid replacement therapy with buprenorphine.  She currently smokes about 3 cigarettes/day but denied vaping, marijuana, or other  illicit drug use at her initial mfm visit.          Current Assessment & Plan     She has a past medical history significant for opioid abuse, having most recently used 3 months prior to pregnancy.  She has declined opioid replacement therapy with buprenorphine         Tobacco smoking affecting pregnancy in first trimester    Current Assessment & Plan     She currently smokes about 3 cigarettes/day but denied vaping, marijuana, or other illicit drug use at her initial mfm visit, given persistent nausea and vomiting consider UDS to rule out Hyperemesis secondary to cannabis         History of surgical biopsy    Current Assessment & Plan     History of open appendectomy and supraumbilical hernia repair " w/ mesh         * (Principal) Status post primary low transverse  section    Current Assessment & Plan          Continue routine post partum care       [id number], HgB 13.2 -> 9.1, received venofer  Pain well controlled: tylenol scheduled, coco prn  Lochia within normal limits: continue to monitor   OOB: as able, encourage ambulation  Passing flatus  Voiding spontaneously  DVT ppx: SCD, Lovenox 40mg  Baby in: nursery  Dispo: anticipate d/c home POD5           Postpartum hemorrhage    Condyloma acuminata    Rubella non-immune status, delivered, current hospitalization    Current Assessment & Plan     MMR ordered         LETICIA (acute kidney injury) (HCC)    Current Assessment & Plan     : Cr elevated at 1.41 up from 0.61  CT A/P - Moderate L hydrouteronephrosis 2ary to uterus enlargement. UA unremarkable.   CT renal: Mild left hydronephrosis and hydroureter involving the proximal 3 cm of the ureter with a transition where the left ureter crosses posterior to an enlarged left ovarian vein which is causing some level of obstruction. Distal to this point, the ureter is not well visualized, and does not opacify with contrast.   Enlarged uterus and heterogeneous endometrium with few foci of gas in the endocervical canal and in the vagina, not unexpected in the early postoperative period.    Creatine 1.41-> 1.40-> 1.37-> 1.44 > 1.49    Plan:  -IV reestablished  -1L LR bolus followed by 125mL/hr LR  -Toradol discontinued  -monitor urine output  - S/p L ureteral stent placement by Urology with plan for outpatient stent removal; f/u BMP           Chest pain    Current Assessment & Plan     Patient with intermittent nonradiating chest pain and shortness of breath since the a.m. of 2023.  Now resolved. Pt w hx of Junctional rhythm with ventricular bigeminy likely in the setting of high vagal tone    EKG 2023: Sinus bradycardia at 59 with sinus arrhythmia, incomplete right bundle branch block.  Normal Axis. No  QT prolongation. T wave inversions in lead III.  No acute ischemic changes. Unchanged from prior EKG in April 2020. EKG not concerning per cardiology  Delta Trop 2,  Unlikely ACS/ HS 3 (low risk), BELLA Risk index low.   BNP <50  CT PE study negative for PE, or other cardiopulmonary process.      Plan:  F/u Cardio consult.              Nausea and vomiting    Current Assessment & Plan     Pt with persistent N/V s/p dilaudid administration  Hx of opioid abuse, per pt clean for 1 year.   Pt reports sxs similar to prior opioid withdrawal, resistant to buprenorphine/naloxone tx.    Initially Improved after Zofran IV, Tigan PRN-> 12/22 symptoms reoccured    Cause unclear, possible dilaudid side effect vs post op N/V. Cardiac and PE w/u neg . Possible acute withdrawal. Low suspicion for acute pyelonephritis (no CVA ttp, afebrile)     Continue antiemetics, IVF                Other Visit Diagnoses       Lactating mother    -  Primary    Arrest of descent, delivered, current hospitalization        Hydronephrosis of left kidney                 Discharge med list:  Contraception: Depo=Provera     Medication List      START taking these medications     acetaminophen 325 mg tablet; Commonly known as: TYLENOL; Take 2 tablets   (650 mg total) by mouth every 6 (six) hours as needed for mild pain     CONTINUE taking these medications     PRENATAL VITAMIN PO       Condition at discharge:   good     Disposition:   Home    Planned Readmission:   No

## 2023-12-18 NOTE — ANESTHESIA POSTPROCEDURE EVALUATION
Post-Op Assessment Note    CV Status:  Stable  Pain Score: 0    Pain management: adequate      Post-op block assessment: no complications   Mental Status:  Alert and awake   Hydration Status:  Euvolemic   PONV Controlled:  Controlled   Airway Patency:  Patent     Post Op Vitals Reviewed: Yes      Staff: CRNA   Comments: epidural catheter tip intact following removal            BP   102/59   Temp   98.4   Pulse  78   Resp   18   SpO2   99%

## 2023-12-18 NOTE — OP NOTE
OPERATIVE REPORT  PATIENT NAME: Kacey Kern    :  1990  MRN: [id number]  Pt Location: [facility] L[facility] OR ROOM 02    SURGERY DATE: 2023    Surgeons and Role:     * Sharifa Lopez DO - Primary     * Best Knott MD - Assisting    Preop Diagnosis:  Arrest of descent, delivered, current hospitalization [O62.1]    Post-Op Diagnosis Codes:     * Arrest of descent, delivered, current hospitalization [O62.1]    Procedure(s) (LRB):   SECTION () (N/A)    Specimen(s):  ID Type Source Tests Collected by Time Destination   1 :  Tissue (Placenta on Hold) OB Only Placenta TISSUE EXAM OB (PLACENTA) ONLY Sharifa Lopez DO 2023 0131    B :  Cord Blood Cord BLOOD GAS, VENOUS, CORD, BLOOD GAS, ARTERIAL, CORD Sharifa Lopez DO 2023 0121        Surgical QBL:  Surgical QBL (mL): 1666 mL      Drains:  Urethral Catheter Non-latex 16 Fr. (Active)   Site Assessment Clean;Skin intact 23 0245   [place] Care Done 23 0245   Collection Container Standard drainage bag 23 024   Securement Method Securing device (Describe) 23 0245   Number of days: 1       Anesthesia Type:   * No anesthesia type entered *    Operative Indications:  Arrest of descent, delivered, current hospitalization [O62.1]  Persistent category 2 tracing      [facility] [employer] Classification System:  No Multiple pregnancy, No Transverse or oblique lie, No Breech lie, Gestational age is > or =37 weeks, Multiparous, No Previous uterine scar, Spontaneous Labor +  is [facility] GROUP 3    Operative Findings:  -live, full term, female , APGARs 8/9 at 1/5 min, vertex occiput posterior, placenta removed manually   -normal appearing uterus, bilateral fallopian tubes, bilateral ovaries   -left hysterotomy extension repaired with 0 vicryl in running fashion   -left uterine artery ligation performed to assist with hemostasis   -surgifoam placed in left hysterotomy extension repair   -hysterotomy repaired in two layers   -hemostasis  achieved at end of procedure     Complications:   None    Procedure and Technique:  32 yo G 2 P 1001 at 40.4 wks presents to labor and delivery for spontaneous labor . After second stage arrest and persistent category 2 tracing diagnosed,  delivery recommended to the patient. Informed consent was obtained from the patient including benefits and risks of procedure. The patient verbalized understanding of the risks and procedure and desired to proceed.     Patient was taken to the operating room where epidural anesthesia was found to be adequate. Ancef and Zithromax was given and SCDs were placed. She was then prepped and draped in the normal sterile fashion in the dorsal supine position with a leftward tilt. A pfannenstiel incision was made with the scalpel and carried to the underlying layer of the fascia with the scalpel. The fascia was then scored in the midline using the scalpel and this incision was extended laterally manually. The inferior aspect of this incision was then grasped with kocher clamps, elevated, and the underlying rectus muscles dissected off. Attention was then turned to the superior aspect of this incision which was grasped with kocher clamps, elevated, and the rectus muscles dissected off. The rectus muscles were  in the midline, the peritoneum identified and entered. The peritoneal incision was then extended superiorly and inferiorly with good visualization of any underlying organs including the bladder. The bladder blade was the inserted, the vesicouterine peritoneum identified. The lower uterine segment identified, and entered in a transverse fashion with the scalpel until the amniotic sac was identified. The uterine incision was extended manually and the amniotic sac entered bluntly. The bladder blade was then removed and the infant was delivered atraumatically. The nose and mouth were suctioned on the abdomen, the cord doubly clamped and cut, and the infant was handed off.  Cord blood and gases were then obtained. The placenta was extracted manually. The uterus was exteriorized and cleared of all clots and debris.     A left hysterotomy extension was visualized by running the lower edge of the hysterotomy. This was repaired with 0-0 vicryl suture in a running fashion. The uterine incision was then repaired with 0-0 vicryl suture in a running locked fashion. Persistent bleeding was noted from the repair site. Left uterine artery ligation was then performed in an interrupted fashion with 0-0 vicryl. A second imbricating layer was then done with 0-0 monocryl suture. After hemostasis was assured, the uterus was returned to the abdomen, the gutters cleared of all clots and debris, and the uterine incision was reexamined for hemostasis. Surgifoam was placed in the left hysterotomy extension repair site for added hemostasis.     The fascia was reapproximated with 0-0 vicryl suture. The subcutaneous tissue was then irrigated, any bleeding point cauterized using the bovie, and the subcutaneous tissue was then closed using plain gut suture. After hemostasis was assured, the skin was closed with subcutaneous stitch using 4-0 monocryl.  There were no complicated and the patient tolerated the procedure well. Spong, lap, and needle counts were correct at the end of the procedure. The patient was cleaned and taken to the recovery room in stable condition. The baby was taken to the regular nursery.      I was present for the entire procedure.    Patient Disposition:  PACU         SIGNATURE: Sharifa Lopez DO  DATE: December 18, 2023  TIME: 2:54 AM

## 2023-12-18 NOTE — OB LABOR/OXYTOCIN SAFETY PROGRESS
Labor Progress Note - Kacey Kern 33 y.o. female MRN: [id number]    Unit/Bed#: -01 Encounter: [id number]       Contraction Frequency (minutes): 3  Contraction Intensity: Moderate/Strong  Uterine Activity Characteristics: Regular  Cervical Dilation: 4-5        Cervical Effacement: 80  Fetal Station: -1  Baseline Rate (FHR): 145 bpm  Fetal Heart Rate (FHT): 145 BPM  FHR Category: 2               Vital Signs:   -120s/50-60s s/p epidural    Notes/comments:    Prolonged 5 minute fetal heart rate deceleration appreciated s/p epidural and amniotomy. Multiple providers present. SVE unchanged. Resolution to baseline 140bpm with maternal repositioning ultimately to hands and knees and fluid bolus. Terbutaline in room but unused due to return to baseline. After appropriate resuscitation, patient repositioned to left lateral decubitus. Continue monitoring with expectant management. Dr. Lopez present in room.    Best Knott MD 12/17/2023 7:05 PM

## 2023-12-18 NOTE — DISCHARGE INSTRUCTIONS
WHAT YOU NEED TO KNOW:   A , or  section, is abdominal surgery to deliver your baby.  DISCHARGE INSTRUCTIONS:   Call 911 for any of the following:   You feel lightheaded, short of breath, and have chest pain.     You cough up blood.  Seek care immediately if:   Blood soaks through your bandage.     Your stitches come apart.     Your arm or leg feels warm, tender, and painful. It may look swollen and red.  Contact your OB if:   You have heavy vaginal bleeding that fills 1 or more sanitary pads in 1 hour.    You have a fever.     Your incision is swollen, red, or draining pus.     You have questions or concerns about yourself or your baby.  Medicines:  You may  need any of the following:  Prescription pain medicine  may be given. Ask how to take this medicine safely.     Acetaminophen  decreases pain and fever. It is available without a doctor's order. Ask how much to take and how often to take it. Follow directions. Acetaminophen can cause liver damage if not taken correctly.    NSAIDs , such as ibuprofen, help decrease swelling, pain, and fever. NSAIDs can cause stomach bleeding or kidney problems in certain people. If you take blood thinner medicine, always ask your healthcare provider if NSAIDs are safe for you. Always read the medicine label and follow directions.    Take your medicine as directed.  Contact your healthcare provider if you think your medicine is not helping or if you have side effects. Tell him or her if you are allergic to any medicine. Keep a list of the medicines, vitamins, and herbs you take. Include the amounts, and when and why you take them. Bring the list or the pill bottles to follow-up visits. Carry your medicine list with you in case of an emergency.  Wound care:  Carefully wash your wound with soap and water every day. Keep your wound clean and dry. Wear loose, comfortable clothes that do not rub against your wound. Ask your OB about bathing and  showering.  Limit activity as directed:   Ask when it is safe for you to drive, walk up stairs, lift heavy objects, and have sex.     Ask when it is okay to exercise, and what types of exercise to do. Start slowly and do more as you get stronger.  Drink liquids as directed:  Liquids help keep you hydrated after your procedure and decrease your risk for a blood clot. Ask how much liquid to drink each day and which liquids are best for you.   Follow up with your OB as directed:  You may need to return to have your stitches or staples removed. Write down your questions so you remember to ask them during your visits.  © 2017 [employer] Information is for End User's use only and may not be sold, redistributed or otherwise used for commercial purposes. All illustrations and images included in CareNotes® are the copyrighted property of [employer]. or [employer].  The above information is an  only. It is not intended as medical advice for individual conditions or treatments. Talk to your doctor, nurse or pharmacist before following any medical regimen to see if it is safe and effective for you.      Postpartum Perineal Care   WHAT YOU NEED TO KNOW:   Postpartum perineal care is care for your perineum after you have a baby. The perineum is your vagina and anus.   DISCHARGE INSTRUCTIONS:   Care for your perineum:  Healthcare providers will give you a small squirt bottle and show you how to use it. Do the following after you use the toilet and before you put on a new pad:  Remove the soiled pad    Use the squirt bottle to rinse your perineum from front to back while you sit on the toilet     Pat the area dry from front to back with toilet paper or a cotton cloth     Put on a fresh pad    Wash your hands  Decrease pain:  Ask your healthcare provider about these and other ways to decrease perineal pain:  Sitz baths:  Healthcare providers may give you a portable sitz bath. This is a  small tub that fits in the toilet. Fill the sitz bath or bathtub with 4 to 6 inches of warm water. Sit in the warm water for 20 minutes 2 to 3 times a day.    Ice:  Ice helps decrease swelling and pain. Ice may also help prevent tissue damage. Use an ice pack, or put crushed ice in a plastic bag. Cover it with a towel and place it on your perineum for 15 to 20 minutes every hour, or as directed.    Medicine spray, wipes, or pads:  Healthcare providers may give you a medicine spray or wipes soaked with numbing medicine to decrease the pain. Pads that contain an herb called witch hazel may also help reduce pain. Use these after perineal care or a sitz bath.  Follow up with your healthcare provider as directed:  Write down your questions so you remember to ask them during your visits.   Contact your healthcare provider if:   You have heavy vaginal bleeding that fills 1 or more sanitary pads in 1 hour.    You have foul-smelling vaginal discharge.    You feel weak or lightheaded.    You have questions or concerns about your condition or care.  Seek care immediately or call 911 if:   You have large blood clots or bright red blood coming from your vagina.    You have abdominal pain, vomiting, and a fever.  © 2017 [employer] Information is for End User's use only and may not be sold, redistributed or otherwise used for commercial purposes. All illustrations and images included in CareNotes® are the copyrighted property of [employer]. or [employer].  The above information is an  only. It is not intended as medical advice for individual conditions or treatments. Talk to your doctor, nurse or pharmacist before following any medical regimen to see if it is safe and effective for you.      Postpartum Depression   WHAT YOU NEED TO KNOW:   What is postpartum depression?  Postpartum depression is a mood disorder that occurs after giving birth. A mood is an emotion or a feeling. Moods  affect your behavior and how you feel about yourself and life in general. Depression is a sad mood that you cannot control. Women often feel sad, afraid, or nervous after their baby is born. These feelings are called postpartum blues or baby blues, and they usually go away in 1 to 2 weeks. With postpartum depression, these symptoms get worse and continue for more than 2 weeks. Postpartum depression is a serious condition that affects your daily activities and relationships.   What causes postpartum depression?  Healthcare providers do not know exactly what causes postpartum depression. It may be caused by a sudden drop in hormone levels after childbirth. A previous episode of postpartum depression or a family history of depression may increase your risk. Several things may trigger postpartum depression:  Lack of support from the baby's father or other family members    Feeling more tired than usual    Stress, a poor diet, or lack of sleep    Pain after childbirth or pain during breastfeeding    Sudden change in lifestyle  How is postpartum depression diagnosed?  Postpartum depression affects your daily activities and your relationships with other people. Healthcare providers will ask you questions about your signs and symptoms and how they are affecting your life. The symptoms of postpartum depression usually begin within 1 month after childbirth. You feel depressed or lose interest in activities you enjoy nearly every day for at least 2 weeks. You also have 4 or more of the following symptoms:  You feel tired or have less energy than usual.     You feel unimportant or guilty most of the time.    You think about hurting or killing yourself.    Your appetite changes. You may lose your appetite and lose weight without trying. Your appetite may also increase and you may gain weight.    You are restless, irritable, or withdrawn.    You have trouble concentrating and remembering things. You have trouble doing daily tasks  or making decisions.    You have trouble sleeping, even after the baby is asleep.  How is postpartum depression treated?   Psychotherapy:  During therapy, you will talk with healthcare providers about how to cope with your feelings and moods. This can be done alone or in a group. It may also be done with family members or your partner.     Antidepressants:  This medicine is given to decrease or stop the symptoms of depression. You usually need to take antidepressants for several weeks before you begin to feel better. Do not stop taking antidepressants unless your healthcare provider tells you to. Healthcare providers may try a different antidepressant if one type does not work.  What can I do to feel better?   Rest:  Do not try to do everything all at the same time. Do only what is needed and let other things wait until later. Ask your family or friends for help, especially if you have other children. Ask your partner to help with night feedings or other baby care. Try to sleep when the baby naps.     Get emotional support:  Share your feelings with your partner, a friend, or another mother.     Take care of yourself:  Shower and dress each day. Do not skip meals. Try to get out of the house a little each day. Get regular exercise. Eat a healthy diet. Avoid alcohol because it can make your depression worse. Do not isolate yourself. Go for a walk or meet with a friend. It is also important that you have some time by yourself each day.  How do I find support and more information?   [facility] [place] [facility] ([facility]), Public Information & Communication Branch  [address] [place] [address], Room [address], [facility] [address]  [place] [address]   Phone: 1- 917 - [phone]  Phone: 2- 223 - [phone]  Web Address: http://www.[facility].nih.gov/  When should I contact my healthcare provider?   You cannot make it to your next visit.    Your depression does not get better with treatment or it gets worse.     You have questions or  concerns about your condition or care.  When should I seek immediate care or call 911?   You think about hurting or killing yourself, your baby, or someone else.    You feel like other people want to hurt you.     You hear voices telling you to hurt yourself or your baby.  CARE AGREEMENT:   You have the right to help plan your care. Learn about your health condition and how it may be treated. Discuss treatment options with your caregivers to decide what care you want to receive. You always have the right to refuse treatment. The above information is an  only. It is not intended as medical advice for individual conditions or treatments. Talk to your doctor, nurse or pharmacist before following any medical regimen to see if it is safe and effective for you.  ©  [employer] Information is for End User's use only and may not be sold, redistributed or otherwise used for commercial purposes. All illustrations and images included in CareNotes® are the copyrighted property of [employer]. or [employer].      Postpartum Bleeding   WHAT YOU NEED TO KNOW:   Postpartum bleeding is vaginal bleeding after childbirth. This bleeding is normal, whether your baby was born vaginally or by . It contains blood and the tissue that lined the inside of your uterus when you were pregnant.   DISCHARGE INSTRUCTIONS:   What to expect with postpartum bleeding:  Postpartum bleeding usually lasts at least 10 days, and may last longer than 6 weeks. Your bleeding may range from light (barely staining a pad) to heavy (soaking a pad in 1 hour). Usually, you have heavier bleeding right after childbirth, which slows over the next few weeks until it stops. The bleeding is red or dark brown with clots for the first 1 to 3 days. It then turns pink for several days, and then becomes a white or yellow discharge until it ends.  Follow up with your obstetrician as directed:  Do not have sex until your  obstetrician says it is okay. Write down your questions so you remember to ask them during your visits.  Contact your healthcare provider or obstetrician if:   Your bleeding increases, or you have heavy bleeding that soaks a pad in 1 hour for 2 hours in a row.    You pass large blood clots.    You are breathing faster than normal, or your heart is beating faster than normal.    You are urinating less than usual, or not at all.    You feel dizzy.    You have questions or concerns about your condition or care.  Seek immediate care or call 911 if:   You are suddenly short of breath and feel lightheaded.    You have sudden chest pain.  © 2017 [employer] Information is for End User's use only and may not be sold, redistributed or otherwise used for commercial purposes. All illustrations and images included in CareNotes® are the copyrighted property of [employer]. or [employer].  The above information is an  only. It is not intended as medical advice for individual conditions or treatments. Talk to your doctor, nurse or pharmacist before following any medical regimen to see if it is safe and effective for you.      Breast Care for the Breast Feeding Mother   WHAT YOU SHOULD KNOW:   Your breasts will go through normal changes while you are breastfeeding. Sometimes breast and nipple problems can develop while you are breastfeeding. Learn about changes that are normal and those that may be a problem. Breast care can help you prevent and manage problems so you and your baby can enjoy the benefits of breastfeeding.  AFTER YOU LEAVE:   Breast changes while you are breastfeeding:   For the first few days after your baby is born, your body makes a small amount of breast milk (colostrum). Within about 2 to 5 days, your body will begin making mature milk. It may take up to 10 days or longer for mature milk to come in. When your mature milk comes in, your breasts will become full and  firm. They may feel tender.     Breastfeeding your baby will decrease the full feeling in your breasts. You may feel a tingly sensation during feedings as milk is released from your breasts. This is called the milk let-down reflex. After 7 or more days, the fullness may feel like it has decreased. Your nipples should look the same as they did before you started breastfeeding. Breasts that feel full before and empty after breastfeeding are signs that breastfeeding is going well.  Breast problems that can occur while you are breastfeeding:   Nipple soreness  may occur when you begin to breastfeed your baby. You may also have nipple soreness if your baby is not latched on to your breast correctly. Correct positioning and latch-on may decrease or stop the pain in your nipples. Work with your caregivers to help your baby latch on correctly. It may also be helpful to place warm, wet compresses on your nipples to help decrease pain.     Plugged milk ducts  may cause painful breast lumps. Plugged ducts may be caused by not emptying your breasts completely during feedings. When your baby pauses during breastfeeding, massage and gently squeeze your breast. Gentle massage may unplug a blocked milk duct. Pump out any milk left in your breasts after your baby is done breastfeeding. Avoid wearing tight tops, tight bras, or under-wire bras, because they may put pressure on your breasts.    Engorgement  may occur as your milk comes in soon after you begin breastfeeding. Engorgement may cause your breasts to become swollen and painful. Your breasts may also become engorged if you miss a feeding or you do not breastfeed on demand. The best way to decrease engorgement symptoms is to empty your breasts by feeding your baby often. Engorgement can make it hard for your baby to latch on to your breast. If this happens, express a small amount of milk and then have your baby latch on. Cold compresses, gel packs, or ice packs on your breasts  can help decrease pain and swelling. Ask your caregiver how often and how long you should use cold, or ice packs.     A breast infection called mastitis  can develop if you have plugged milk ducts or engorgement. Mastitis causes your breasts to become red, swollen, and painful. You may also have flu-like symptoms, such as chills and a fever. Place heat on your breasts to help decrease the pain. You may want to place a moist, warm cloth on the painful breast or both of your breasts. Ask how often to do this. Your primary healthcare provider (PHP) may suggest that you take an NSAID, such as ibuprofen, to decrease pain and swelling. He may also order antibiotics to treat mastitis. Ask about feeding your baby when you have a breast infection.  How to help prevent or manage breast problems while you are breastfeeding:   Learn how to position your baby and latch him on correctly.  To latch your baby correctly to your breast, make sure that his mouth covers most of your areola (dark area around your nipple). He should not be attached only to the nipple. Your baby is latched on well if you feel comfortable and do not feel pain. A correct latch helps him get enough milk and can help to prevent sore nipples and other breast problems. There are several breastfeeding positions that you can try. Find the position that works best for you and your baby. Ask your caregiver for more information about how to hold and breastfeed your baby.     Prevent biting.  Your baby may get teeth at about 3 to 4 months of age. To help prevent biting, break his suction once he is finished or if he has fallen asleep. To break his suction, slip a finger into the side of his mouth. If your baby bites you, respond with surprise or unhappiness. Offer praise when he does not bite you.     Breastfeed your baby regularly.  Feed your baby 8 to 12 times a day. You may need to wake up your baby at night to feed him. It is okay to feed from 1 or both breasts  at each feeding. Your baby should breastfeed from both breasts equally over the course of a day. If your baby only feeds from 1 side during a feeding, offer your other breast to him first for the next feeding.     Schedule and keep follow-up visits.  Talk to your baby's pediatrician or your PHP during follow-up visits if you have breast problems. Caregivers may suggest that you, or you and your partner, attend classes on breastfeeding. You also may want to join a breastfeeding support group. Caregivers may suggest that you see a lactation consultant. This is a caregiver who can help you with breastfeeding.  Contact your PHP if:   You have a fever and chills.    You have body aches and you feel like you do not have any energy.    One or both of your breasts is red, swollen or hard, painful, and feels warm or hot.    You have breast engorgement that does not get better within 24 hours.     You see or feel a lump in your breast that hurts when you touch it.    You have nipple pain during breastfeeding or between feedings.     Your nipples are red, dry, cracked, or bleeding, or they have scabs on them.     You have questions or concerns about your condition or care.  © 2014 [employer]. Information is for End User's use only and may not be sold, redistributed or otherwise used for commercial purposes. All illustrations and images included in CareNotes® are the copyrighted property of [employer] or [employer].  The above information is an  only. It is not intended as medical advice for individual conditions or treatments. Talk to your doctor, nurse or pharmacist before following any medical regimen to see if it is safe and effective for you.

## 2023-12-19 PROBLEM — Z28.39 RUBELLA NON-IMMUNE STATUS, DELIVERED, CURRENT HOSPITALIZATION: Status: ACTIVE | Noted: 2023-12-19

## 2023-12-19 PROBLEM — A63.0 CONDYLOMA ACUMINATA: Status: ACTIVE | Noted: 2023-12-19

## 2023-12-19 PROCEDURE — 99024 POSTOP FOLLOW-UP VISIT: CPT | Performed by: STUDENT IN AN ORGANIZED HEALTH CARE EDUCATION/TRAINING PROGRAM

## 2023-12-19 RX ADMIN — ACETAMINOPHEN 650 MG: 325 TABLET, FILM COATED ORAL at 11:38

## 2023-12-19 RX ADMIN — IBUPROFEN 600 MG: 600 TABLET, FILM COATED ORAL at 20:43

## 2023-12-19 RX ADMIN — ACETAMINOPHEN 650 MG: 325 TABLET, FILM COATED ORAL at 00:13

## 2023-12-19 RX ADMIN — KETOROLAC TROMETHAMINE 30 MG: 30 INJECTION, SOLUTION INTRAMUSCULAR; INTRAVENOUS at 02:14

## 2023-12-19 RX ADMIN — ACETAMINOPHEN 650 MG: 325 TABLET, FILM COATED ORAL at 22:57

## 2023-12-19 RX ADMIN — ACETAMINOPHEN 650 MG: 325 TABLET, FILM COATED ORAL at 05:43

## 2023-12-19 RX ADMIN — KETOROLAC TROMETHAMINE 30 MG: 30 INJECTION, SOLUTION INTRAMUSCULAR; INTRAVENOUS at 14:54

## 2023-12-19 RX ADMIN — KETOROLAC TROMETHAMINE 30 MG: 30 INJECTION, SOLUTION INTRAMUSCULAR; INTRAVENOUS at 09:00

## 2023-12-19 RX ADMIN — ACETAMINOPHEN 650 MG: 325 TABLET, FILM COATED ORAL at 17:19

## 2023-12-19 RX ADMIN — HYDROMORPHONE HYDROCHLORIDE 0.5 MG: 1 INJECTION, SOLUTION INTRAMUSCULAR; INTRAVENOUS; SUBCUTANEOUS at 04:05

## 2023-12-19 RX ADMIN — ENOXAPARIN SODIUM 40 MG: 40 INJECTION SUBCUTANEOUS at 09:00

## 2023-12-19 NOTE — PROGRESS NOTES
Obstetrics Progress Note  Kacey Kern 33 y.o. female MRN: [id number]  Unit/Bed#:  320-01 Encounter: [id number]    Assessment/Plan:    Postpartum Day #1 s/p 1LTCS (arrest of descent) complicated by L extension and uterine artery ligation, postpartum hemorrhage, and suspected chorio, now s/p 1 dose of Unasyn 3g, currently stable. Baby in room. By issue:    * Status post primary low transverse  section  Assessment & Plan       Continue routine post partum care       [id number], HgB 13.2 -> 9.1, received venofer  Pain well controlled: tylenol/motrin scheduled, coco prn  Lochia within normal limits: continue to monitor   OOB: as able, encourage ambulation  Passing flatus  Voiding spontaneously  DVT ppx: SCD, Lovenox 40mg  Baby in: nursery  Dispo: anticipate d/c home POD3 vs POD4      Rubella non-immune status, delivered, current hospitalization  Assessment & Plan  MMR ordered    History of surgical biopsy  Assessment & Plan  History of open appendectomy and supraumbilical hernia repair w/ mesh    Tobacco smoking affecting pregnancy in first trimester  Assessment & Plan  She currently smokes about 3 cigarettes/day but denied vaping, marijuana, or other illicit drug use at her initial [facility] visit    History of opioid abuse (HCC)  Assessment & Plan  She has a past medical history significant for opioid abuse, having most recently used 3 months prior to pregnancy.  She has declined opioid replacement therapy with buprenorphine    Incomplete right bundle branch block  Assessment & Plan  First noted in 2019  Asymptomatic in pregnancy    Bipolar depression (HCC)  Assessment & Plan  Diagnosed after previous pregnancy, not on meds    40 weeks gestation of pregnancy-resolved as of 2023  Assessment & Plan  -Prenatal labs completed  -28 week labs completed  -EFW 63%        Subjective/Objective     Subjective:   No acute events overnight. Pain: controlled. Tolerating PO: yes. Voiding: yes. Flatus: passing. Ambulating:  yes. Chest pain: no. Shortness of breath: no. Leg pain: no. Lochia: within normal limits. Baby in room, feeding via breast.    Objective:   Vitals:   Temp:  [97.8 °F (36.6 °C)-98.4 °F (36.9 °C)] 98.3 °F (36.8 °C)  HR:  [63-72] 70  Resp:  [16-18] 16  BP: (102-119)/(55-65) 107/59       Intake/Output Summary (Last 24 hours) at 12/19/2023 0716  Last data filed at 12/19/2023 0015  Gross per 24 hour   Intake --   Output 950 ml   Net -950 ml       Lab Results   Component Value Date    WBC 17.76 (H) 12/18/2023    HGB 9.1 (L) 12/18/2023    HCT 26.9 (L) 12/18/2023    MCV 91 12/18/2023    PLT 99 (L) 12/18/2023       Physical Exam:   General: alert and oriented x3, in no apparent distress  Cardiovascular: regular rate  Pulmonary: normal effort  Abdomen: Soft, non-tender, non-distended, no rebound or guarding. Uterine fundus firm and non-tender, at the umbilicus  Incision: clean/dry/intact  Extremities: Non tender with no edema      Radha Dozier MD  PGY-I, OBGYN  12/19/2023, 7:16 AM

## 2023-12-19 NOTE — ASSESSMENT & PLAN NOTE
Continue routine post partum care       [id number], HgB 13.2 -> 9.1, received venofer  Pain well controlled: tylenol scheduled, coco prn  Lochia within normal limits: continue to monitor   OOB: as able, encourage ambulation  Passing flatus  Voiding spontaneously  DVT ppx: SCD, Lovenox 40mg  Baby in: nursery  Dispo: anticipate d/c home POD5

## 2023-12-19 NOTE — CASE MANAGEMENT
Case Management Progress Note    Patient name Kacey Kern  Location /-01 MRN [id number]  : 1990 Date 2023       LOS (days): 2  Geometric Mean LOS (GMLOS) (days):   Days to GMLOS:        OBJECTIVE:        Current admission status: Inpatient  Preferred Pharmacy:   [facility] [place]  [place] - [address]  [address]  [place] [address]  Phone: [phone] Fax: [phone]    [facility]/pharmacy #[id number] - [place] - [address]  [address]  [place] [address]  Phone: [phone] Fax: [phone]    Primary Care Provider: Raimundo Cohen MD    Primary Insurance: [employer] ???  Secondary Insurance:     PROGRESS NOTE:  CM consult received as MOB and baby both had a positive drug screen for THC.     Met with MOB. FOB was at bedside and MOB did provide permission to speak in front of him during assessment.     MICKI delivered a baby girl via  yesterday. Baby in nursery. MICKI stated she lives with her parents Leticia Penny and Tyrel Kern, and her 9yo son at [address] [place], [place] [address] [address]. MICKI stated she shares partial custody of her son with her father Tyrel as her son's FOB is not in the picture. She reported to have all the necessities for the baby, transportation means, and good family support. She plans on bottle feeding, and use [facility] for the baby's care.     Addressed CM consult regarding positive drug screen for THC in MOB and baby. MICKI reported she used edibles that contained THC around Thanksgiving that she bought over the counter. She reported that is when she last used. She denied any other usage of any other substances during her pregnancy. MICKI does have a hx of substance/alcohol abuse but stated she has been attending an outpt program through [facility] and has a counselor. She currently on a leave from the program due to her delivering her baby.     MICKI reported to have PPD after her last pregnancy.  Forwarded to MA   It is noted in the chart that pt also has Bipolar Disorder which was diagnosed prior to pregnancy which MOB did not mention she has. MOB does have hx of legal issues when she stated she was in skilled nursing last year for missing a hearing. Did not state why she had a hearing and reason for missing it. She stated she is currently not on any probation or have any legal issues.     Informed mother due to positive THC in MOB and exposure to baby since baby is positive. A referral will need to be made to CYS. MOB voiced understanding.     MECHELLE was unsuccessful in placing a report electronically so CM contacted [place] [employer] [phone] and spoke with rep John ID# [id number]. Report made and [facility] office will be following up.     Will continue to follow.

## 2023-12-19 NOTE — CASE MANAGEMENT
"   Case Management Progress Note    Patient name Kacey Kern  Location /-01 MRN [id number]  : 1990 Date 2023       LOS (days): 2  Geometric Mean LOS (GMLOS) (days):   Days to GMLOS:        OBJECTIVE:        Current admission status: Inpatient  Preferred Pharmacy:   [facility] [place]  [place] - [address]  [address]  [address]  [place] [address]  Phone: [phone] Fax: [phone]    [facility]/pharmacy #[id number] - [place] - [address]  [address]  [place] [address]  Phone: [phone] Fax: [phone]    Primary Care Provider: Raimundo Cohen MD    Primary Insurance: [employer]  Secondary Insurance:     PROGRESS NOTE:  Email received from [employer] the breast pump was delivered last night to home- \"Pump was delivered last night according to the [employer] tracking. [id number]\"          "

## 2023-12-19 NOTE — PLAN OF CARE
Problem: PAIN - ADULT  Goal: Verbalizes/displays adequate comfort level or baseline comfort level  Description: Interventions:  - Encourage patient to monitor pain and request assistance  - Assess pain using appropriate pain scale  - Administer analgesics based on type and severity of pain and evaluate response  - Implement non-pharmacological measures as appropriate and evaluate response  - Consider cultural and social influences on pain and pain management  - Notify physician/advanced practitioner if interventions unsuccessful or patient reports new pain  Outcome: Progressing     Problem: INFECTION - ADULT  Goal: Absence or prevention of progression during hospitalization  Description: INTERVENTIONS:  - Assess and monitor for signs and symptoms of infection  - Monitor lab/diagnostic results  - Monitor all insertion sites, i.e. indwelling lines, tubes, and drains  - Monitor endotracheal if appropriate and nasal secretions for changes in amount and color  - [place] appropriate cooling/warming therapies per order  - Administer medications as ordered  - Instruct and encourage patient and family to use good hand hygiene technique  - Identify and instruct in appropriate isolation precautions for identified infection/condition  Outcome: Progressing  Goal: Absence of fever/infection during neutropenic period  Description: INTERVENTIONS:  - Monitor WBC    Outcome: Progressing     Problem: SAFETY ADULT  Goal: Patient will remain free of falls  Description: INTERVENTIONS:  - Educate patient/family on patient safety including physical limitations  - Instruct patient to call for assistance with activity   - Consult OT/PT to assist with strengthening/mobility   - Keep Call bell within reach  - Keep bed low and locked with side rails adjusted as appropriate  - Keep care items and personal belongings within reach  - Initiate and maintain comfort rounds  - Make Fall Risk Sign visible to staff  - Offer Toileting every  Hours,  in advance of need  - Initiate/Maintain alarm  - Obtain necessary fall risk management equipment:   - Apply yellow socks and bracelet for high fall risk patients  - Consider moving patient to room near nurses station  Outcome: Progressing  Goal: Maintain or return to baseline ADL function  Description: INTERVENTIONS:  -  Assess patient's ability to carry out ADLs; assess patient's baseline for ADL function and identify physical deficits which impact ability to perform ADLs (bathing, care of mouth/teeth, toileting, grooming, dressing, etc.)  - Assess/evaluate cause of self-care deficits   - Assess range of motion  - Assess patient's mobility; develop plan if impaired  - Assess patient's need for assistive devices and provide as appropriate  - Encourage maximum independence but intervene and supervise when necessary  - Involve family in performance of ADLs  - Assess for home care needs following discharge   - Consider OT consult to assist with ADL evaluation and planning for discharge  - Provide patient education as appropriate  Outcome: Progressing  Goal: Maintains/Returns to pre admission functional level  Description: INTERVENTIONS:  - Perform AM-PAC 6 Click Basic Mobility/ Daily Activity assessment daily.  - Set and communicate daily mobility goal to care team and patient/family/caregiver.   - Collaborate with rehabilitation services on mobility goals if consulted  - Perform Range of Motion  times a day.  - Reposition patient every  hours.  - Dangle patient  times a day  - Stand patient  times a day  - Ambulate patient  times a day  - Out of bed to chair  times a day   - Out of bed for meals  times a day  - Out of bed for toileting  - Record patient progress and toleration of activity level   Outcome: Progressing     Problem: DISCHARGE PLANNING  Goal: Discharge to home or other facility with appropriate resources  Description: INTERVENTIONS:  - Identify barriers to discharge w/patient and caregiver  - Arrange for  needed discharge resources and transportation as appropriate  - Identify discharge learning needs (meds, wound care, etc.)  - Arrange for interpretive services to assist at discharge as needed  - Refer to Case Management Department for coordinating discharge planning if the patient needs post-hospital services based on physician/advanced practitioner order or complex needs related to functional status, cognitive ability, or social support system  Outcome: Progressing     Problem: POSTPARTUM  Goal: Experiences normal postpartum course  Description: INTERVENTIONS:  - Monitor maternal vital signs  - Assess uterine involution and lochia  Outcome: Progressing  Goal: Appropriate maternal -  bonding  Description: INTERVENTIONS:  - Identify family support  - Assess for appropriate maternal/infant bonding   -Encourage maternal/infant bonding opportunities  - Referral to  or  as needed  Outcome: Progressing  Goal: Establishment of infant feeding pattern  Description: INTERVENTIONS:  - Assess breast/bottle feeding  - Refer to lactation as needed  Outcome: Progressing  Goal: Incision(s), wounds(s) or drain site(s) healing without S/S of infection  Description: INTERVENTIONS  - Assess and document dressing, incision, wound bed, drain sites and surrounding tissue  - Provide patient and family education  - Perform skin care/dressing changes every   Outcome: Progressing

## 2023-12-19 NOTE — CASE MANAGEMENT
Case Management Progress Note    Patient name Kacey Kern  Location /-01 MRN [id number]  : 1990 Date 2023       LOS (days): 2  Geometric Mean LOS (GMLOS) (days):   Days to GMLOS:        OBJECTIVE:        Current admission status: Inpatient  Preferred Pharmacy:   [facility] [place] - [place] - [address]  [address]  [place] [address]  Phone: [phone] Fax: [phone]    [facility]/pharmacy #[id number] - [place] - [address]  [address]  [place] [address]  Phone: [phone] Fax: [phone]    Primary Care Provider: Raimundo Cohen MD    Primary Insurance: [employer]  Secondary Insurance:     PROGRESS NOTE:  Another consult received for [employer]e breast pump. Under [place] order looks like an order for breast pump was sent on  by OB office and pump sent for delivery. Email sent to [facility] to confirm if pump was sent out for delivery or not.

## 2023-12-19 NOTE — PLAN OF CARE
Problem: PAIN - ADULT  Goal: Verbalizes/displays adequate comfort level or baseline comfort level  Description: Interventions:  - Encourage patient to monitor pain and request assistance  - Assess pain using appropriate pain scale  - Administer analgesics based on type and severity of pain and evaluate response  - Implement non-pharmacological measures as appropriate and evaluate response  - Consider cultural and social influences on pain and pain management  - Notify physician/advanced practitioner if interventions unsuccessful or patient reports new pain  Outcome: Progressing     Problem: INFECTION - ADULT  Goal: Absence or prevention of progression during hospitalization  Description: INTERVENTIONS:  - Assess and monitor for signs and symptoms of infection  - Monitor lab/diagnostic results  - Monitor all insertion sites, i.e. indwelling lines, tubes, and drains  - Monitor endotracheal if appropriate and nasal secretions for changes in amount and color  - [place] appropriate cooling/warming therapies per order  - Administer medications as ordered  - Instruct and encourage patient and family to use good hand hygiene technique  - Identify and instruct in appropriate isolation precautions for identified infection/condition  Outcome: Progressing  Goal: Absence of fever/infection during neutropenic period  Description: INTERVENTIONS:  - Monitor WBC    Outcome: Progressing     Problem: SAFETY ADULT  Goal: Patient will remain free of falls  Description: INTERVENTIONS:  - Educate patient/family on patient safety including physical limitations  - Instruct patient to call for assistance with activity   - Consult OT/PT to assist with strengthening/mobility   - Keep Call bell within reach  - Keep bed low and locked with side rails adjusted as appropriate  - Keep care items and personal belongings within reach  - Initiate and maintain comfort rounds  - Make Fall Risk Sign visible to staff  - Offer Toileting every  Hours,  in advance of need  - Initiate/Maintain alarm  - Obtain necessary fall risk management equipment:   - Apply yellow socks and bracelet for high fall risk patients  - Consider moving patient to room near nurses station  Outcome: Progressing  Goal: Maintain or return to baseline ADL function  Description: INTERVENTIONS:  -  Assess patient's ability to carry out ADLs; assess patient's baseline for ADL function and identify physical deficits which impact ability to perform ADLs (bathing, care of mouth/teeth, toileting, grooming, dressing, etc.)  - Assess/evaluate cause of self-care deficits   - Assess range of motion  - Assess patient's mobility; develop plan if impaired  - Assess patient's need for assistive devices and provide as appropriate  - Encourage maximum independence but intervene and supervise when necessary  - Involve family in performance of ADLs  - Assess for home care needs following discharge   - Consider OT consult to assist with ADL evaluation and planning for discharge  - Provide patient education as appropriate  Outcome: Progressing  Goal: Maintains/Returns to pre admission functional level  Description: INTERVENTIONS:  - Perform AM-PAC 6 Click Basic Mobility/ Daily Activity assessment daily.  - Set and communicate daily mobility goal to care team and patient/family/caregiver.   - Collaborate with rehabilitation services on mobility goals if consulted  - Perform Range of Motion  times a day.  - Reposition patient every  hours.  - Dangle patient  times a day  - Stand patient  times a day  - Ambulate patient  times a day  - Out of bed to chair  times a day   - Out of bed for meals times a day  - Out of bed for toileting  - Record patient progress and toleration of activity level   Outcome: Progressing     Problem: DISCHARGE PLANNING  Goal: Discharge to home or other facility with appropriate resources  Description: INTERVENTIONS:  - Identify barriers to discharge w/patient and caregiver  - Arrange for  needed discharge resources and transportation as appropriate  - Identify discharge learning needs (meds, wound care, etc.)  - Arrange for interpretive services to assist at discharge as needed  - Refer to Case Management Department for coordinating discharge planning if the patient needs post-hospital services based on physician/advanced practitioner order or complex needs related to functional status, cognitive ability, or social support system  Outcome: Progressing     Problem: POSTPARTUM  Goal: Experiences normal postpartum course  Description: INTERVENTIONS:  - Monitor maternal vital signs  - Assess uterine involution and lochia  Outcome: Progressing  Goal: Appropriate maternal -  bonding  Description: INTERVENTIONS:  - Identify family support  - Assess for appropriate maternal/infant bonding   -Encourage maternal/infant bonding opportunities  - Referral to  or  as needed  Outcome: Progressing  Goal: Establishment of infant feeding pattern  Description: INTERVENTIONS:  - Assess breast/bottle feeding  - Refer to lactation as needed  Outcome: Progressing  Goal: Incision(s), wounds(s) or drain site(s) healing without S/S of infection  Description: INTERVENTIONS  - Assess and document dressing, incision, wound bed, drain sites and surrounding tissue  - Provide patient and family education  - Perform skin care/dressing changes every   Outcome: Progressing

## 2023-12-20 ENCOUNTER — APPOINTMENT (INPATIENT)
Dept: CT IMAGING | Facility: HOSPITAL | Age: 33
DRG: 540 | End: 2023-12-20
Payer: COMMERCIAL

## 2023-12-20 LAB
ANION GAP SERPL CALCULATED.3IONS-SCNC: 10 MMOL/L
BUN SERPL-MCNC: 18 MG/DL (ref 5–25)
CALCIUM SERPL-MCNC: 8.3 MG/DL (ref 8.4–10.2)
CARDIAC TROPONIN I PNL SERPL HS: 6 NG/L
CHLORIDE SERPL-SCNC: 106 MMOL/L (ref 96–108)
CO2 SERPL-SCNC: 25 MMOL/L (ref 21–32)
CREAT SERPL-MCNC: 1.41 MG/DL (ref 0.6–1.3)
GFR SERPL CREATININE-BSD FRML MDRD: 48 ML/MIN/1.73SQ M
GLUCOSE SERPL-MCNC: 90 MG/DL (ref 65–140)
POTASSIUM SERPL-SCNC: 3.9 MMOL/L (ref 3.5–5.3)
SODIUM SERPL-SCNC: 141 MMOL/L (ref 135–147)

## 2023-12-20 PROCEDURE — 84484 ASSAY OF TROPONIN QUANT: CPT

## 2023-12-20 PROCEDURE — 99024 POSTOP FOLLOW-UP VISIT: CPT | Performed by: OBSTETRICS & GYNECOLOGY

## 2023-12-20 PROCEDURE — 93005 ELECTROCARDIOGRAM TRACING: CPT

## 2023-12-20 PROCEDURE — 80048 BASIC METABOLIC PNL TOTAL CA: CPT

## 2023-12-20 PROCEDURE — G1004 CDSM NDSC: HCPCS

## 2023-12-20 PROCEDURE — 71275 CT ANGIOGRAPHY CHEST: CPT

## 2023-12-20 PROCEDURE — 74177 CT ABD & PELVIS W/CONTRAST: CPT

## 2023-12-20 RX ORDER — KETOROLAC TROMETHAMINE 30 MG/ML
30 INJECTION, SOLUTION INTRAMUSCULAR; INTRAVENOUS EVERY 6 HOURS SCHEDULED
Status: DISCONTINUED | OUTPATIENT
Start: 2023-12-20 | End: 2023-12-20

## 2023-12-20 RX ORDER — ONDANSETRON 2 MG/ML
4 INJECTION INTRAMUSCULAR; INTRAVENOUS EVERY 6 HOURS
Status: DISCONTINUED | OUTPATIENT
Start: 2023-12-20 | End: 2023-12-20

## 2023-12-20 RX ORDER — METOCLOPRAMIDE HCL 10 MG/2 ML
20 SYRINGE (ML) INJECTION ONCE
Status: COMPLETED | OUTPATIENT
Start: 2023-12-20 | End: 2023-12-20

## 2023-12-20 RX ORDER — ONDANSETRON 2 MG/ML
4 INJECTION INTRAMUSCULAR; INTRAVENOUS ONCE
Status: COMPLETED | OUTPATIENT
Start: 2023-12-20 | End: 2023-12-20

## 2023-12-20 RX ORDER — HYDROXYZINE HYDROCHLORIDE 25 MG/1
25 TABLET, FILM COATED ORAL EVERY 6 HOURS PRN
Status: DISCONTINUED | OUTPATIENT
Start: 2023-12-20 | End: 2023-12-23 | Stop reason: HOSPADM

## 2023-12-20 RX ORDER — IBUPROFEN 600 MG/1
600 TABLET ORAL EVERY 6 HOURS PRN
Status: DISCONTINUED | OUTPATIENT
Start: 2023-12-21 | End: 2023-12-20

## 2023-12-20 RX ORDER — IBUPROFEN 600 MG/1
600 TABLET ORAL EVERY 6 HOURS PRN
Status: DISCONTINUED | OUTPATIENT
Start: 2023-12-20 | End: 2023-12-20

## 2023-12-20 RX ORDER — PROMETHAZINE HYDROCHLORIDE 25 MG/ML
25 INJECTION, SOLUTION INTRAMUSCULAR; INTRAVENOUS EVERY 6 HOURS PRN
Status: CANCELLED | OUTPATIENT
Start: 2023-12-20

## 2023-12-20 RX ORDER — ONDANSETRON 4 MG/1
4 TABLET, ORALLY DISINTEGRATING ORAL EVERY 6 HOURS PRN
Status: DISCONTINUED | OUTPATIENT
Start: 2023-12-20 | End: 2023-12-23 | Stop reason: HOSPADM

## 2023-12-20 RX ORDER — ACETAMINOPHEN 325 MG/1
975 TABLET ORAL EVERY 6 HOURS PRN
Status: DISCONTINUED | OUTPATIENT
Start: 2023-12-20 | End: 2023-12-23 | Stop reason: HOSPADM

## 2023-12-20 RX ADMIN — KETOROLAC TROMETHAMINE 30 MG: 30 INJECTION, SOLUTION INTRAMUSCULAR; INTRAVENOUS at 09:51

## 2023-12-20 RX ADMIN — ENOXAPARIN SODIUM 40 MG: 40 INJECTION SUBCUTANEOUS at 09:00

## 2023-12-20 RX ADMIN — IOHEXOL 100 ML: 350 INJECTION, SOLUTION INTRAVENOUS at 21:51

## 2023-12-20 RX ADMIN — ONDANSETRON 4 MG: 2 INJECTION INTRAMUSCULAR; INTRAVENOUS at 07:26

## 2023-12-20 RX ADMIN — KETOROLAC TROMETHAMINE 30 MG: 30 INJECTION, SOLUTION INTRAMUSCULAR; INTRAVENOUS at 14:30

## 2023-12-20 RX ADMIN — METOCLOPRAMIDE 20 MG: 5 INJECTION, SOLUTION INTRAMUSCULAR; INTRAVENOUS at 09:53

## 2023-12-20 RX ADMIN — ONDANSETRON 4 MG: 2 INJECTION INTRAMUSCULAR; INTRAVENOUS at 09:51

## 2023-12-20 RX ADMIN — SODIUM CHLORIDE, SODIUM LACTATE, POTASSIUM CHLORIDE, AND CALCIUM CHLORIDE 1000 ML: .6; .31; .03; .02 INJECTION, SOLUTION INTRAVENOUS at 21:05

## 2023-12-20 RX ADMIN — ACETAMINOPHEN 650 MG: 325 TABLET, FILM COATED ORAL at 05:47

## 2023-12-20 RX ADMIN — HYDROMORPHONE HYDROCHLORIDE 0.5 MG: 1 INJECTION, SOLUTION INTRAMUSCULAR; INTRAVENOUS; SUBCUTANEOUS at 07:29

## 2023-12-20 RX ADMIN — ONDANSETRON 4 MG: 2 INJECTION INTRAMUSCULAR; INTRAVENOUS at 14:30

## 2023-12-20 RX ADMIN — IBUPROFEN 600 MG: 600 TABLET, FILM COATED ORAL at 03:35

## 2023-12-20 RX ADMIN — TRIMETHOBENZAMIDE HYDROCHLORIDE 200 MG: 100 INJECTION INTRAMUSCULAR at 20:00

## 2023-12-20 RX ADMIN — ACETAMINOPHEN 650 MG: 325 TABLET, FILM COATED ORAL at 11:36

## 2023-12-20 RX ADMIN — IBUPROFEN 600 MG: 600 TABLET, FILM COATED ORAL at 09:00

## 2023-12-20 NOTE — PROGRESS NOTES
Discussed diagnosis of arrest of descent secondary to suspected occiput posterior. Pt continues to be zero station despite adequate pushing effort.  section recommended to patient. Risks, benefits, alternatives discussed. All questions answered. Patient agreeable with plan for  at this time.    I spoke to daughter Linnette, when patient was here for her visit we supplied oxygen for her.  They left and took one of our oxygen tanks by mistake.  Patient's daughter will come by on Friday and switch them out.  Their oxygen tank is in the supply room and it has a [place] sticker on it.

## 2023-12-20 NOTE — PLAN OF CARE
Problem: PAIN - ADULT  Goal: Verbalizes/displays adequate comfort level or baseline comfort level  Description: Interventions:  - Encourage patient to monitor pain and request assistance  - Assess pain using appropriate pain scale  - Administer analgesics based on type and severity of pain and evaluate response  - Implement non-pharmacological measures as appropriate and evaluate response  - Consider cultural and social influences on pain and pain management  - Notify physician/advanced practitioner if interventions unsuccessful or patient reports new pain  Outcome: Progressing     Problem: INFECTION - ADULT  Goal: Absence or prevention of progression during hospitalization  Description: INTERVENTIONS:  - Assess and monitor for signs and symptoms of infection  - Monitor lab/diagnostic results  - Monitor all insertion sites, i.e. indwelling lines, tubes, and drains  - Monitor endotracheal if appropriate and nasal secretions for changes in amount and color  - [place] appropriate cooling/warming therapies per order  - Administer medications as ordered  - Instruct and encourage patient and family to use good hand hygiene technique  - Identify and instruct in appropriate isolation precautions for identified infection/condition  Outcome: Progressing  Goal: Absence of fever/infection during neutropenic period  Description: INTERVENTIONS:  - Monitor WBC    Outcome: Progressing     Problem: SAFETY ADULT  Goal: Patient will remain free of falls  Description: INTERVENTIONS:  - Educate patient/family on patient safety including physical limitations  - Instruct patient to call for assistance with activity   - Consult OT/PT to assist with strengthening/mobility   - Keep Call bell within reach  - Keep bed low and locked with side rails adjusted as appropriate  - Keep care items and personal belongings within reach  - Initiate and maintain comfort rounds  - Make Fall Risk Sign visible to staff  - Offer Toileting every  Hours,  in advance of need  - Initiate/Maintain alarm  - Obtain necessary fall risk management equipment:   - Apply yellow socks and bracelet for high fall risk patients  - Consider moving patient to room near nurses station  Outcome: Progressing  Goal: Maintain or return to baseline ADL function  Description: INTERVENTIONS:  -  Assess patient's ability to carry out ADLs; assess patient's baseline for ADL function and identify physical deficits which impact ability to perform ADLs (bathing, care of mouth/teeth, toileting, grooming, dressing, etc.)  - Assess/evaluate cause of self-care deficits   - Assess range of motion  - Assess patient's mobility; develop plan if impaired  - Assess patient's need for assistive devices and provide as appropriate  - Encourage maximum independence but intervene and supervise when necessary  - Involve family in performance of ADLs  - Assess for home care needs following discharge   - Consider OT consult to assist with ADL evaluation and planning for discharge  - Provide patient education as appropriate  Outcome: Progressing  Goal: Maintains/Returns to pre admission functional level  Description: INTERVENTIONS:  - Perform AM-PAC 6 Click Basic Mobility/ Daily Activity assessment daily.  - Set and communicate daily mobility goal to care team and patient/family/caregiver.   - Collaborate with rehabilitation services on mobility goals if consulted  - Perform Range of Motion  times a day.  - Reposition patient every  hours.  - Dangle patient  times a day  - Stand patient  times a day  - Ambulate patient  times a day  - Out of bed to chair  times a day   - Out of bed for meals  times a day  - Out of bed for toileting  - Record patient progress and toleration of activity level   Outcome: Progressing     Problem: DISCHARGE PLANNING  Goal: Discharge to home or other facility with appropriate resources  Description: INTERVENTIONS:  - Identify barriers to discharge w/patient and caregiver  - Arrange for  needed discharge resources and transportation as appropriate  - Identify discharge learning needs (meds, wound care, etc.)  - Arrange for interpretive services to assist at discharge as needed  - Refer to Case Management Department for coordinating discharge planning if the patient needs post-hospital services based on physician/advanced practitioner order or complex needs related to functional status, cognitive ability, or social support system  Outcome: Progressing     Problem: POSTPARTUM  Goal: Experiences normal postpartum course  Description: INTERVENTIONS:  - Monitor maternal vital signs  - Assess uterine involution and lochia  Outcome: Progressing  Goal: Appropriate maternal -  bonding  Description: INTERVENTIONS:  - Identify family support  - Assess for appropriate maternal/infant bonding   -Encourage maternal/infant bonding opportunities  - Referral to  or  as needed  Outcome: Progressing  Goal: Establishment of infant feeding pattern  Description: INTERVENTIONS:  - Assess breast/bottle feeding  - Refer to lactation as needed  Outcome: Progressing  Goal: Incision(s), wounds(s) or drain site(s) healing without S/S of infection  Description: INTERVENTIONS  - Assess and document dressing, incision, wound bed, drain sites and surrounding tissue  - Provide patient and family education  - Perform skin care/dressing changes every   Outcome: Progressing

## 2023-12-20 NOTE — PROGRESS NOTES
Obstetrics Progress Note  Kacey Kern 33 y.o. female MRN: [id number]  Unit/Bed#:  320-01 Encounter: [id number]    Assessment/Plan:    Postpartum Day #2 s/p 1LTCS (AoD) delivery complicated by L extension and uterine artery ligation, postpartum hemorrhage (QBL 1666 cc) and suspected chorio, now s/p 1 dose of Unasyn 3g, currently stable. Overnight, describing worsening pain in her belly, with moderate fundal tenderness. Baby in room. By issue:    * Status post primary low transverse  section  Assessment & Plan       Continue routine post partum care       [id number], HgB 13.2 -> 9.1, received venofer  Pain well controlled: tylenol/motrin scheduled, coco prn  Lochia within normal limits: continue to monitor   OOB: as able, encourage ambulation  Passing flatus  Voiding spontaneously  DVT ppx: SCD, Lovenox 40mg  Baby in: nursery  Dispo: anticipate d/c home POD3 vs POD4      Rubella non-immune status, delivered, current hospitalization  Assessment & Plan  MMR ordered    History of surgical biopsy  Assessment & Plan  History of open appendectomy and supraumbilical hernia repair w/ mesh    Tobacco smoking affecting pregnancy in first trimester  Assessment & Plan  She currently smokes about 3 cigarettes/day but denied vaping, marijuana, or other illicit drug use at her initial m visit    History of opioid abuse (HCC)  Assessment & Plan  She has a past medical history significant for opioid abuse, having most recently used 3 months prior to pregnancy.  She has declined opioid replacement therapy with buprenorphine    Incomplete right bundle branch block  Assessment & Plan  First noted in 2019  Asymptomatic in pregnancy    Bipolar depression (HCC)  Assessment & Plan  Diagnosed after previous pregnancy, not on meds    40 weeks gestation of pregnancy-resolved as of 2023  Assessment & Plan  -Prenatal labs completed  -28 week labs completed  -EFW 63%        Subjective/Objective     Subjective:   No acute events  "overnight. Now describing pain at her incision and \"inside my belly,\" that worsened overnight; patient due for Tylenol as of rounding. Tolerating PO: yes. Voiding: spontaneously. Flatus: passing. Ambulating: yes. Chest pain: no. Shortness of breath: no. Leg pain: no. Lochia: within normal limits. Baby inroom, feeding via breast.    Objective:   Vitals:   Temp:  [98.2 °F (36.8 °C)-98.5 °F (36.9 °C)] 98.4 °F (36.9 °C)  HR:  [63-80] 68  Resp:  [16-20] 18  BP: (109-122)/(58-70) 122/58     No intake or output data in the 24 hours ending 12/20/23 0507    Lab Results   Component Value Date    WBC 17.76 (H) 12/18/2023    HGB 9.1 (L) 12/18/2023    HCT 26.9 (L) 12/18/2023    MCV 91 12/18/2023    PLT 99 (L) 12/18/2023       Physical Exam:   General: alert and oriented x3, in no apparent distress  Cardiovascular: regular rate  Pulmonary: normal effort  Abdomen: Soft, moderate fundal tenderness, non-distended, no rebound or guarding. Uterine fundus firm and non-tender, at the umbilicus  Incision: clean/dry/intact  Extremities: Non tender with mild edema      Radha Dozier MD  PGY-I, OBGYN  12/20/2023, 5:07 AM    "

## 2023-12-20 NOTE — QUICK NOTE
Kacey yeung is a 34 yo  with a history of OUD POD # 2 from primary  examined at bedside due to intractable nausea and vomiting as well as anxiety attack. She states that the antiemetic medication has not been helping her.  She thinks getting the dilaudid after her delivery precipitated her symptoms. She reports last use of opioids was 1 year ago. She also endorses chest pain and shortness of breath. Chest pain does not radiate. She denies dizziness and confusion. She reports moderate abdominal pain. She  endorses chills and sweating. She is trying medication for anxiety but is open to praying with .       COW score 7, mild withdrawal symptoms.  Vitals:    23 1548   BP: 134/67   Pulse: 58   Resp: 20   Temp: 99.1 °F (37.3 °C)   SpO2: 99%         Plan:   Chest pain/SOB: EKG, troponins, if Qtc wnl then plan to give Phenergan. Tigan to be considered if other emetics exhausted with no improvement of symptoms  Anxiety: Atarax patient declined,  consulted   F/U BMP, replete electrolytes accordingly  Monitor COW score, Suboxone to be considered if patient is amenable  Consider CT to rule out PE    Discussed with Dr. Sandra Monson MD  OBGYN PGY-2

## 2023-12-20 NOTE — PLAN OF CARE
Problem: PAIN - ADULT  Goal: Verbalizes/displays adequate comfort level or baseline comfort level  Description: Interventions:  - Encourage patient to monitor pain and request assistance  - Assess pain using appropriate pain scale  - Administer analgesics based on type and severity of pain and evaluate response  - Implement non-pharmacological measures as appropriate and evaluate response  - Consider cultural and social influences on pain and pain management  - Notify physician/advanced practitioner if interventions unsuccessful or patient reports new pain  Outcome: Progressing     Problem: INFECTION - ADULT  Goal: Absence or prevention of progression during hospitalization  Description: INTERVENTIONS:  - Assess and monitor for signs and symptoms of infection  - Monitor lab/diagnostic results  - Monitor all insertion sites, i.e. indwelling lines, tubes, and drains  - Monitor endotracheal if appropriate and nasal secretions for changes in amount and color  - [place] appropriate cooling/warming therapies per order  - Administer medications as ordered  - Instruct and encourage patient and family to use good hand hygiene technique  - Identify and instruct in appropriate isolation precautions for identified infection/condition  Outcome: Progressing  Goal: Absence of fever/infection during neutropenic period  Description: INTERVENTIONS:  - Monitor WBC    Outcome: Progressing     Problem: SAFETY ADULT  Goal: Patient will remain free of falls  Description: INTERVENTIONS:  - Educate patient/family on patient safety including physical limitations  - Instruct patient to call for assistance with activity   - Consult OT/PT to assist with strengthening/mobility   - Keep Call bell within reach  - Keep bed low and locked with side rails adjusted as appropriate  - Keep care items and personal belongings within reach  - Initiate and maintain comfort rounds  - Make Fall Risk Sign visible to staff  - Offer Toileting every  Hours,  in advance of need  - Initiate/Maintain alarm  - Obtain necessary fall risk management equipment:   - Apply yellow socks and bracelet for high fall risk patients  - Consider moving patient to room near nurses station  Outcome: Progressing  Goal: Maintain or return to baseline ADL function  Description: INTERVENTIONS:  -  Assess patient's ability to carry out ADLs; assess patient's baseline for ADL function and identify physical deficits which impact ability to perform ADLs (bathing, care of mouth/teeth, toileting, grooming, dressing, etc.)  - Assess/evaluate cause of self-care deficits   - Assess range of motion  - Assess patient's mobility; develop plan if impaired  - Assess patient's need for assistive devices and provide as appropriate  - Encourage maximum independence but intervene and supervise when necessary  - Involve family in performance of ADLs  - Assess for home care needs following discharge   - Consider OT consult to assist with ADL evaluation and planning for discharge  - Provide patient education as appropriate  Outcome: Progressing  Goal: Maintains/Returns to pre admission functional level  Description: INTERVENTIONS:  - Perform AM-PAC 6 Click Basic Mobility/ Daily Activity assessment daily.  - Set and communicate daily mobility goal to care team and patient/family/caregiver.   - Collaborate with rehabilitation services on mobility goals if consulted  - Perform Range of Motion times a day.  - Reposition patient every  hours.  - Dangle patient  times a day  - Stand patient  times a day  - Ambulate patient  times a day  - Out of bed to chair  times a day   - Out of bed for meals times a day  - Out of bed for toileting  - Record patient progress and toleration of activity level   Outcome: Progressing     Problem: DISCHARGE PLANNING  Goal: Discharge to home or other facility with appropriate resources  Description: INTERVENTIONS:  - Identify barriers to discharge w/patient and caregiver  - Arrange for  needed discharge resources and transportation as appropriate  - Identify discharge learning needs (meds, wound care, etc.)  - Arrange for interpretive services to assist at discharge as needed  - Refer to Case Management Department for coordinating discharge planning if the patient needs post-hospital services based on physician/advanced practitioner order or complex needs related to functional status, cognitive ability, or social support system  Outcome: Progressing     Problem: POSTPARTUM  Goal: Experiences normal postpartum course  Description: INTERVENTIONS:  - Monitor maternal vital signs  - Assess uterine involution and lochia  Outcome: Progressing  Goal: Appropriate maternal -  bonding  Description: INTERVENTIONS:  - Identify family support  - Assess for appropriate maternal/infant bonding   -Encourage maternal/infant bonding opportunities  - Referral to  or  as needed  Outcome: Progressing  Goal: Establishment of infant feeding pattern  Description: INTERVENTIONS:  - Assess breast/bottle feeding  - Refer to lactation as needed  Outcome: Progressing  Goal: Incision(s), wounds(s) or drain site(s) healing without S/S of infection  Description: INTERVENTIONS  - Assess and document dressing, incision, wound bed, drain sites and surrounding tissue  - Provide patient and family education  - Perform skin care/dressing changes every   Outcome: Progressing

## 2023-12-20 NOTE — PROGRESS NOTES
Vitals:    12/20/23 1548   BP: 134/67   Pulse: 58   Resp: 20   Temp: 99.1 °F (37.3 °C)   SpO2: 99%     Night team received patient hand off regarding chest pain and shortness of breath  Additional symptoms including dizziness, confusion, chills, and sweating  Patient previously assessed at bedside with plan for cardiac workup  ECG with findings consistent known right bundle branch block reviewed with ED resident  Patient currently declines blood work and removed IV  Desires to shower for symptom relief and transition to PO medications  Continue to monitor for acute changes in clinical status    D/w Dr. Sandra Knott MD  OB/GYN PGY-3

## 2023-12-21 ENCOUNTER — APPOINTMENT (OUTPATIENT)
Dept: ULTRASOUND IMAGING | Facility: HOSPITAL | Age: 33
DRG: 540 | End: 2023-12-21
Payer: COMMERCIAL

## 2023-12-21 PROBLEM — R11.2 NAUSEA AND VOMITING: Status: ACTIVE | Noted: 2023-12-21

## 2023-12-21 PROBLEM — R07.9 CHEST PAIN: Status: ACTIVE | Noted: 2023-12-21

## 2023-12-21 PROBLEM — N17.9 AKI (ACUTE KIDNEY INJURY) (HCC): Status: ACTIVE | Noted: 2023-12-21

## 2023-12-21 LAB
2HR DELTA HS TROPONIN: 3 NG/L
4HR DELTA HS TROPONIN: 2 NG/L
ANION GAP SERPL CALCULATED.3IONS-SCNC: 6 MMOL/L
ANION GAP SERPL CALCULATED.3IONS-SCNC: 7 MMOL/L
BUN SERPL-MCNC: 17 MG/DL (ref 5–25)
BUN SERPL-MCNC: 18 MG/DL (ref 5–25)
CALCIUM SERPL-MCNC: 7.8 MG/DL (ref 8.4–10.2)
CALCIUM SERPL-MCNC: 7.9 MG/DL (ref 8.4–10.2)
CARDIAC TROPONIN I PNL SERPL HS: 8 NG/L
CARDIAC TROPONIN I PNL SERPL HS: 9 NG/L
CHLORIDE SERPL-SCNC: 107 MMOL/L (ref 96–108)
CHLORIDE SERPL-SCNC: 108 MMOL/L (ref 96–108)
CO2 SERPL-SCNC: 26 MMOL/L (ref 21–32)
CO2 SERPL-SCNC: 26 MMOL/L (ref 21–32)
CREAT SERPL-MCNC: 1.37 MG/DL (ref 0.6–1.3)
CREAT SERPL-MCNC: 1.4 MG/DL (ref 0.6–1.3)
GFR SERPL CREATININE-BSD FRML MDRD: 49 ML/MIN/1.73SQ M
GFR SERPL CREATININE-BSD FRML MDRD: 50 ML/MIN/1.73SQ M
GLUCOSE SERPL-MCNC: 76 MG/DL (ref 65–140)
GLUCOSE SERPL-MCNC: 90 MG/DL (ref 65–140)
POTASSIUM SERPL-SCNC: 3.7 MMOL/L (ref 3.5–5.3)
POTASSIUM SERPL-SCNC: 4 MMOL/L (ref 3.5–5.3)
SODIUM SERPL-SCNC: 140 MMOL/L (ref 135–147)
SODIUM SERPL-SCNC: 140 MMOL/L (ref 135–147)

## 2023-12-21 PROCEDURE — 88307 TISSUE EXAM BY PATHOLOGIST: CPT | Performed by: PATHOLOGY

## 2023-12-21 PROCEDURE — 80048 BASIC METABOLIC PNL TOTAL CA: CPT | Performed by: EMERGENCY MEDICINE

## 2023-12-21 PROCEDURE — 76775 US EXAM ABDO BACK WALL LIM: CPT

## 2023-12-21 PROCEDURE — 99024 POSTOP FOLLOW-UP VISIT: CPT | Performed by: STUDENT IN AN ORGANIZED HEALTH CARE EDUCATION/TRAINING PROGRAM

## 2023-12-21 PROCEDURE — 99254 IP/OBS CNSLTJ NEW/EST MOD 60: CPT | Performed by: INTERNAL MEDICINE

## 2023-12-21 PROCEDURE — 84484 ASSAY OF TROPONIN QUANT: CPT

## 2023-12-21 PROCEDURE — 80048 BASIC METABOLIC PNL TOTAL CA: CPT

## 2023-12-21 RX ADMIN — DOCUSATE SODIUM 100 MG: 100 CAPSULE, LIQUID FILLED ORAL at 17:40

## 2023-12-21 RX ADMIN — ENOXAPARIN SODIUM 40 MG: 40 INJECTION SUBCUTANEOUS at 09:01

## 2023-12-21 RX ADMIN — TRIMETHOBENZAMIDE HYDROCHLORIDE 200 MG: 100 INJECTION INTRAMUSCULAR at 20:02

## 2023-12-21 RX ADMIN — SODIUM CHLORIDE, POTASSIUM CHLORIDE, SODIUM LACTATE AND CALCIUM CHLORIDE 125 ML/HR: 600; 310; 30; 20 INJECTION, SOLUTION INTRAVENOUS at 17:40

## 2023-12-21 RX ADMIN — TRIMETHOBENZAMIDE HYDROCHLORIDE 200 MG: 100 INJECTION INTRAMUSCULAR at 02:04

## 2023-12-21 RX ADMIN — FAMOTIDINE 20 MG: 20 TABLET ORAL at 17:40

## 2023-12-21 RX ADMIN — SODIUM CHLORIDE, POTASSIUM CHLORIDE, SODIUM LACTATE AND CALCIUM CHLORIDE 125 ML/HR: 600; 310; 30; 20 INJECTION, SOLUTION INTRAVENOUS at 00:21

## 2023-12-21 RX ADMIN — TRIMETHOBENZAMIDE HYDROCHLORIDE 200 MG: 100 INJECTION INTRAMUSCULAR at 09:04

## 2023-12-21 RX ADMIN — FAMOTIDINE 20 MG: 20 TABLET ORAL at 09:02

## 2023-12-21 RX ADMIN — DOCUSATE SODIUM 100 MG: 100 CAPSULE, LIQUID FILLED ORAL at 09:02

## 2023-12-21 NOTE — QUICK NOTE
Patient complaining of persistent nausea and vomiting after receiving Dilaudid in the PACU.  History of opioid use disorder, states that she has been clean for the past year.  She also is endorsing chest pain and shortness of breath, intermittent in nature w. Inc anxiety.  Symptoms unrelieved with Zofran IV.  Patient reports relief from showering.  No syncope. VSS. Per prior chart review reports patient had similar symptoms and EKG changes in 2020, suspected to be secondary to increased vagal tone 2ary to vomiting per cardiology consult.     At signout, patient was noted to be refusing any further lab work, IV placement, testing or medications. I independently interpreted patient's EKG showing sinus bradycardia with sinus arrhythmia, with incomplete right bundle branch block, Normal Axis, No QT prolongation and inverted T waves in leads III. EKG today appears similar to prior EKG done on 4/192020.  Given chest pain, and abnormal EKG, recommended continuing further cardiac workup with patient.  Patient now agreeable to further w/u and plan.  Physical exam with intermittent dry heaving, but otherwise benign, lungs CTAB.  No respiratory distress heart regular rate and rhythm.  Abdomen soft nontender. No LE edema.   Vitals:    12/20/23 1548   BP: 134/67   Pulse: 58   Resp: 20   Temp: 99.1 °F (37.3 °C)   SpO2: 99%       Plan: Trop, BMP, CT PE study w/ A/P, cardio consult, antiemetic   ____________________________________________________________________________    Addendum 2102: BMP with significant creatinine elevation at 1.41 from 0.6.  Patient has been vomiting, suspect prerenal LETICIA at this time.  Patient without an IV for the past few hours, IV established at this time will give 1 L bolus of LR followed by 125 mill per hour.  Patient reporting improvement in her nausea after Tigan, currently chest pain-free and shortness of breath improved.  Troponin, 6.  BNP unremarkable. Unlikely ACS, however given >5 will obtain  2hr Trop    Addendum 2238: Discussed case with on call cardiologist. Per Dr. Soto, EKG not concerning. Cardiology team will see in consult. CT PE study pending.     Addendum 0145: CT PE study negative for acute PE or other cardiopulmonary process.  Normal postop changes noted. Unlikely acute bowel obstruction, as pt is having BMs.  Left hydroureteronephrosis likely secondary to enlarged uterus. Given this, LETICIA likely multifactorial. Patient afebrile, without CVA tenderness, unlikely pyelonephritis.  She is resting comfortably at this time, nausea and pain improved.     Vitals:    12/21/23 0001   BP: 111/58   Pulse: 63   Resp: 18   Temp: 99.2 °F (37.3 °C)   SpO2: 95%     Addendum 0247: Delta trop 2. Unlikely ACS. HEART score 3,BELLA risk low.

## 2023-12-21 NOTE — PLAN OF CARE
Problem: PAIN - ADULT  Goal: Verbalizes/displays adequate comfort level or baseline comfort level  Description: Interventions:  - Encourage patient to monitor pain and request assistance  - Assess pain using appropriate pain scale  - Administer analgesics based on type and severity of pain and evaluate response  - Implement non-pharmacological measures as appropriate and evaluate response  - Consider cultural and social influences on pain and pain management  - Notify physician/advanced practitioner if interventions unsuccessful or patient reports new pain  Outcome: Progressing     Problem: INFECTION - ADULT  Goal: Absence or prevention of progression during hospitalization  Description: INTERVENTIONS:  - Assess and monitor for signs and symptoms of infection  - Monitor lab/diagnostic results  - Monitor all insertion sites, i.e. indwelling lines, tubes, and drains  - Monitor endotracheal if appropriate and nasal secretions for changes in amount and color  - [place] appropriate cooling/warming therapies per order  - Administer medications as ordered  - Instruct and encourage patient and family to use good hand hygiene technique  - Identify and instruct in appropriate isolation precautions for identified infection/condition  Outcome: Progressing  Goal: Absence of fever/infection during neutropenic period  Description: INTERVENTIONS:  - Monitor WBC    Outcome: Progressing     Problem: SAFETY ADULT  Goal: Patient will remain free of falls  Description: INTERVENTIONS:  - Educate patient/family on patient safety including physical limitations  - Instruct patient to call for assistance with activity   - Consult OT/PT to assist with strengthening/mobility   - Keep Call bell within reach  - Keep bed low and locked with side rails adjusted as appropriate  - Keep care items and personal belongings within reach  - Initiate and maintain comfort rounds  - Make Fall Risk Sign visible to staff  - Apply yellow socks and bracelet  for high fall risk patients  - Consider moving patient to room near nurses station  Outcome: Progressing  Goal: Maintain or return to baseline ADL function  Description: INTERVENTIONS:  -  Assess patient's ability to carry out ADLs; assess patient's baseline for ADL function and identify physical deficits which impact ability to perform ADLs (bathing, care of mouth/teeth, toileting, grooming, dressing, etc.)  - Assess/evaluate cause of self-care deficits   - Assess range of motion  - Assess patient's mobility; develop plan if impaired  - Assess patient's need for assistive devices and provide as appropriate  - Encourage maximum independence but intervene and supervise when necessary  - Involve family in performance of ADLs  - Assess for home care needs following discharge   - Consider OT consult to assist with ADL evaluation and planning for discharge  - Provide patient education as appropriate  Outcome: Progressing  Goal: Maintains/Returns to pre admission functional level  Description: INTERVENTIONS:  - Perform AM-PAC 6 Click Basic Mobility/ Daily Activity assessment daily.  - Set and communicate daily mobility goal to care team and patient/family/caregiver.   - Collaborate with rehabilitation services on mobility goals if consulted  - Out of bed for toileting  - Record patient progress and toleration of activity level   Outcome: Progressing     Problem: DISCHARGE PLANNING  Goal: Discharge to home or other facility with appropriate resources  Description: INTERVENTIONS:  - Identify barriers to discharge w/patient and caregiver  - Arrange for needed discharge resources and transportation as appropriate  - Identify discharge learning needs (meds, wound care, etc.)  - Arrange for interpretive services to assist at discharge as needed  - Refer to Case Management Department for coordinating discharge planning if the patient needs post-hospital services based on physician/advanced practitioner order or complex needs  related to functional status, cognitive ability, or social support system  Outcome: Progressing     Problem: POSTPARTUM  Goal: Experiences normal postpartum course  Description: INTERVENTIONS:  - Monitor maternal vital signs  - Assess uterine involution and lochia  Outcome: Progressing  Goal: Appropriate maternal -  bonding  Description: INTERVENTIONS:  - Identify family support  - Assess for appropriate maternal/infant bonding   -Encourage maternal/infant bonding opportunities  - Referral to  or  as needed  Outcome: Progressing  Goal: Establishment of infant feeding pattern  Description: INTERVENTIONS:  - Assess breast/bottle feeding  - Refer to lactation as needed  Outcome: Progressing  Goal: Incision(s), wounds(s) or drain site(s) healing without S/S of infection  Description: INTERVENTIONS  - Assess and document dressing, incision, wound bed, drain sites and surrounding tissue  - Provide patient and family education    Outcome: Progressing

## 2023-12-21 NOTE — PLAN OF CARE
Problem: PAIN - ADULT  Goal: Verbalizes/displays adequate comfort level or baseline comfort level  Description: Interventions:  - Encourage patient to monitor pain and request assistance  - Assess pain using appropriate pain scale  - Administer analgesics based on type and severity of pain and evaluate response  - Implement non-pharmacological measures as appropriate and evaluate response  - Consider cultural and social influences on pain and pain management  - Notify physician/advanced practitioner if interventions unsuccessful or patient reports new pain  Outcome: Progressing     Problem: INFECTION - ADULT  Goal: Absence or prevention of progression during hospitalization  Description: INTERVENTIONS:  - Assess and monitor for signs and symptoms of infection  - Monitor lab/diagnostic results  - Monitor all insertion sites, i.e. indwelling lines, tubes, and drains  - Monitor endotracheal if appropriate and nasal secretions for changes in amount and color  - [place] appropriate cooling/warming therapies per order  - Administer medications as ordered  - Instruct and encourage patient and family to use good hand hygiene technique  - Identify and instruct in appropriate isolation precautions for identified infection/condition  Outcome: Progressing  Goal: Absence of fever/infection during neutropenic period  Description: INTERVENTIONS:  - Monitor WBC    Outcome: Progressing     Problem: SAFETY ADULT  Goal: Patient will remain free of falls  Description: INTERVENTIONS:  - Educate patient/family on patient safety including physical limitations  - Instruct patient to call for assistance with activity   - Consult OT/PT to assist with strengthening/mobility   - Keep Call bell within reach  - Keep bed low and locked with side rails adjusted as appropriate  - Keep care items and personal belongings within reach  - Initiate and maintain comfort rounds  - Make Fall Risk Sign visible to staff  - Apply yellow socks and bracelet  for high fall risk patients  - Consider moving patient to room near nurses station  Outcome: Progressing  Goal: Maintain or return to baseline ADL function  Description: INTERVENTIONS:  -  Assess patient's ability to carry out ADLs; assess patient's baseline for ADL function and identify physical deficits which impact ability to perform ADLs (bathing, care of mouth/teeth, toileting, grooming, dressing, etc.)  - Assess/evaluate cause of self-care deficits   - Assess range of motion  - Assess patient's mobility; develop plan if impaired  - Assess patient's need for assistive devices and provide as appropriate  - Encourage maximum independence but intervene and supervise when necessary  - Involve family in performance of ADLs  - Assess for home care needs following discharge   - Consider OT consult to assist with ADL evaluation and planning for discharge  - Provide patient education as appropriate  Outcome: Progressing  Goal: Maintains/Returns to pre admission functional level  Description: INTERVENTIONS:  - Perform AM-PAC 6 Click Basic Mobility/ Daily Activity assessment daily.  - Set and communicate daily mobility goal to care team and patient/family/caregiver.   - Collaborate with rehabilitation services on mobility goals if consulted  - Out of bed for toileting  - Record patient progress and toleration of activity level   Outcome: Progressing     Problem: DISCHARGE PLANNING  Goal: Discharge to home or other facility with appropriate resources  Description: INTERVENTIONS:  - Identify barriers to discharge w/patient and caregiver  - Arrange for needed discharge resources and transportation as appropriate  - Identify discharge learning needs (meds, wound care, etc.)  - Arrange for interpretive services to assist at discharge as needed  - Refer to Case Management Department for coordinating discharge planning if the patient needs post-hospital services based on physician/advanced practitioner order or complex needs  related to functional status, cognitive ability, or social support system  Outcome: Progressing     Problem: POSTPARTUM  Goal: Experiences normal postpartum course  Description: INTERVENTIONS:  - Monitor maternal vital signs  - Assess uterine involution and lochia  Outcome: Progressing  Goal: Appropriate maternal -  bonding  Description: INTERVENTIONS:  - Identify family support  - Assess for appropriate maternal/infant bonding   -Encourage maternal/infant bonding opportunities  - Referral to  or  as needed  Outcome: Progressing  Goal: Establishment of infant feeding pattern  Description: INTERVENTIONS:  - Assess breast/bottle feeding  - Refer to lactation as needed  Outcome: Progressing  Goal: Incision(s), wounds(s) or drain site(s) healing without S/S of infection  Description: INTERVENTIONS  - Assess and document dressing, incision, wound bed, drain sites and surrounding tissue  - Provide patient and family education  Outcome: Progressing

## 2023-12-21 NOTE — ASSESSMENT & PLAN NOTE
12/20: Cr elevated at 1.41 up from 0.61  CT A/P - Moderate L hydrouteronephrosis 2ary to uterus enlargement. UA unremarkable.   CT renal: Mild left hydronephrosis and hydroureter involving the proximal 3 cm of the ureter with a transition where the left ureter crosses posterior to an enlarged left ovarian vein which is causing some level of obstruction. Distal to this point, the ureter is not well visualized, and does not opacify with contrast.   Enlarged uterus and heterogeneous endometrium with few foci of gas in the endocervical canal and in the vagina, not unexpected in the early postoperative period.    Creatine 1.41-> 1.40-> 1.37-> 1.44 > 1.49    Plan:  -IV reestablished  -1L LR bolus followed by 125mL/hr LR  -Toradol discontinued  -monitor urine output  - S/p L ureteral stent placement by Urology with plan for outpatient stent removal; f/u BMP

## 2023-12-21 NOTE — PROGRESS NOTES
Obstetrics Progress Note  Kacey Kern 33 y.o. female MRN: [id number]  Unit/Bed#:  320-01 Encounter: [id number]    Assessment/Plan:    Postpartum Day #3 s/p 1LTCS (AoD) delivery complicated by L extension and uterine artery ligation, postpartum hemorrhage (QBL 1666 cc) and suspected chorio, now s/p 1 dose of Unasyn 3g, currently stable. Overnight, describing worsening pain in her belly, with moderate fundal tenderness. Baby in room. By issue:    Nausea and vomiting  Assessment & Plan  Pt with persistent N/V s/p dilaudid administration  Hx of opioid abuse, per pt clean for 1 year.   Pt reports sxs similar to prior opioid withdrawal, resistant to buprenorphine/naloxone tx.   Improved after Zofran IV, Tigan PRN    Cause unclear, possible dilaudid side effect vs post op N/V. Cardiac and PE w/u neg. Unlikely acute withdrawal. Low suspicion for acute pyelonephritis (no CVA ttp, afebrile)     Plan:  Continue antiemetics, IVF  CT PE study with A/P: normal post op changes, Moderate L hydro and uteronehphrosis 2ary to enlarged uterus. No obstruction.          Chest pain  Assessment & Plan  Patient with intermittent nonradiating chest pain and shortness of breath since the a.m. of 12/20/2023.  Now resolved. Pt w hx of Junctional rhythm with ventricular bigeminy likely in the setting of high vagal tone    EKG 12/20/2023: Sinus bradycardia at 59 with sinus arrhythmia, incomplete right bundle branch block.  Normal Axis. No QT prolongation. T wave inversions in lead III.  No acute ischemic changes. Unchanged from prior EKG in April 2020. EKG not concerning per cardiology  Delta Trop 2,  Unlikely ACS/ HS 3 (low risk), BELLA Risk index low.   BNP <50  CT PE study negative for PE, or other cardiopulmonary process.      Plan:  F/u Cardio consult.         LETICIA (acute kidney injury) (HCC)  Assessment & Plan  12/20: Cr elevated at 1.41 up from 0.61  Likely mixed: pre-renal (2ary to N/V) vs post-renal givne CT A/P - Moderate L  hydrouteronephrosis 2ary to uterus enlargement. Additionally had received Toradol for pain   UA unremarkable.     Plan:  -IV reestablished  -1L LR bolus followed by 125mL/hr LR  -Repeat BMP  AM 1.40, Toradol discontinued  -monitor UO    Rubella non-immune status, delivered, current hospitalization  Assessment & Plan  MMR ordered    History of surgical biopsy  Assessment & Plan  History of open appendectomy and supraumbilical hernia repair w/ mesh    Tobacco smoking affecting pregnancy in first trimester  Assessment & Plan  She currently smokes about 3 cigarettes/day but denied vaping, marijuana, or other illicit drug use at her initial [facility] visit    History of opioid abuse (HCC)  Assessment & Plan  She has a past medical history significant for opioid abuse, having most recently used 3 months prior to pregnancy.  She has declined opioid replacement therapy with buprenorphine    Incomplete right bundle branch block  Assessment & Plan  First noted in       EKG 2023: Sinus bradycardia at 59 with sinus arrhythmia, incomplete right bundle branch block. Normal axis. No QT prolongation. T wave inversions in lead III.  Unchanged from prior EKG in 2020.    See chest pain A&P for further details.         Bipolar depression (HCC)  Assessment & Plan  Diagnosed after previous pregnancy, not on meds    * Status post primary low transverse  section  Assessment & Plan       Continue routine post partum care       [id number], HgB 13.2 -> 9.1, received venofer  Pain well controlled: tylenol scheduled, coco prn  Lochia within normal limits: continue to monitor   OOB: as able, encourage ambulation  Passing flatus  Voiding spontaneously  DVT ppx: SCD, Lovenox 40mg  Baby in: nursery  Dispo: anticipate d/c home POD3 vs POD4      40 weeks gestation of pregnancy-resolved as of 2023  Assessment & Plan  -Prenatal labs completed  -28 week labs completed  -EFW 63%        Subjective/Objective     Subjective:    Nause and vomiting has resolved after the Tigan and she has tolerated ice chips. She would like to try eating today. . Abdominal pain is tolerable. She is hesitant to use any pain medications due to effects from dilauded and then toradol causing the LETICIA.  Voiding: spontaneously. Flatus: passing. Ambulating: yes. Chest pain: no. Shortness of breath: no. Leg pain: no. Lochia: within normal limits. Baby inroom, feeding via breast.    Objective:   Vitals:   Temp:  [98.7 °F (37.1 °C)-99.2 °F (37.3 °C)] 99.2 °F (37.3 °C)  HR:  [58-70] 63  Resp:  [18-20] 18  BP: (111-134)/(58-70) 111/58     No intake or output data in the 24 hours ending 12/21/23 0704    Lab Results   Component Value Date    WBC 17.76 (H) 12/18/2023    HGB 9.1 (L) 12/18/2023    HCT 26.9 (L) 12/18/2023    MCV 91 12/18/2023    PLT 99 (L) 12/18/2023       Physical Exam:   General: alert and oriented x3, in no apparent distress  Cardiovascular: regular rate  Pulmonary: normal effort  Abdomen: Soft, moderate fundal tenderness, non-distended, no rebound or guarding. Uterine fundus firm and non-tender, at the umbilicus  Incision: clean/dry/intact  Extremities: Non tender with mild edema      Radha Dozier MD  PGY-I, OBGYN  12/21/2023, 7:04 AM

## 2023-12-21 NOTE — CASE MANAGEMENT
Case Management Progress Note    Patient name Kacey Kern  Location /-01 MRN [id number]  : 1990 Date 2023       LOS (days): 4  Geometric Mean LOS (GMLOS) (days):   Days to GMLOS:        OBJECTIVE:        Current admission status: Inpatient  Preferred Pharmacy:   \[facility]"" [facility] - [place], [place] - [address]  [address]  [place] [address]  Phone: [phone] Fax: [phone]    [facility] #[id number] - [place] - [address]  [address]  [facility] [place] [address]  Phone: [phone] Fax: [phone]    Primary Care Provider: Raimundo Cohen MD    Primary Insurance: [employer]  Secondary Insurance:     PROGRESS NOTE:    CM spoke with Orlin Johnson CYS worker from [facility] [phone] he reported CYS case was closed. No follow up needed.

## 2023-12-21 NOTE — ASSESSMENT & PLAN NOTE
Patient with intermittent nonradiating chest pain and shortness of breath since the a.m. of 12/20/2023.  Now resolved. Pt w hx of Junctional rhythm with ventricular bigeminy likely in the setting of high vagal tone    EKG 12/20/2023: Sinus bradycardia at 59 with sinus arrhythmia, incomplete right bundle branch block.  Normal Axis. No QT prolongation. T wave inversions in lead III.  No acute ischemic changes. Unchanged from prior EKG in April 2020. EKG not concerning per cardiology  Delta Trop 2,  Unlikely ACS/ HS 3 (low risk), BELLA Risk index low.   BNP <50  CT PE study negative for PE, or other cardiopulmonary process.      Plan:  F/u Cardio consult.

## 2023-12-21 NOTE — CONSULTS
Cardiology   Kacey Kern 33 y.o. female MRN: [id number]  Unit/Bed#: -01 Encounter: [id number]      Reason for Consult / Principal Problem: Chest pain, cardiac arrhythmia     Physician Requesting Consult:  Sharifa Lopez DO    Outpatient Cardiologist: None    Assessment  1. Sinus bradycardia w/incomplete RBBB.   -Asymptomatic.   -Possibly more pronounced bradycardia during N/V episodes w/increased vagal tone. In review of prior ECG's from 2019 had accelerated junctional rhythm during a hospitalization for opioid withdrawal --- was experiencing severe nausea/vomiting during her withdrawal state which likely caused increased vagal tone.   -ECG 2023 - sinus bradycardia, rate 56 bpm,  msec; incomplete RBBB (chronic)  -Not on telemetry currently but heart rates appear stable on vital sign trend review.  -Not currently on ANVB agents.     2. Atypical CP  -Suspect secondary to persistent nausea/vomiting. This has since resolved as N/V is under better control.   -ECG on 3/20 - w/o acute ischemic changes.   -HS troponin level x 3 - normal.     3. LETICIA -- likely prerenal etiology 2/2 to N/V  -Management per the OB team.   -Creat level 1.4 this a.m  -Receiving IVFs for hydration    Plan  -Pt feels well this a.m w/the exception of some mild incisional pain. Denies and specific cardiac complaints.   -Now that N/V has resolved and heart rates appear normal on vital sign trends -Obtain repeat ECG now to re-evaluate rhythm/rate.   -No further inpatient cardiac workup or treatment is necessitated at this time.       HPI: Kacey Kern 33 y.o. year old female with no significant past cardiac hx.   Current cigarette smoker, hx of opioid abuse  She did not previously follow with a routine Cardiology provider prior to admission.    She presented to the [facility] on 2023 for an elective .     On  pt reported severe / persistent nausea/vomiting in the evening Per documentation was having  incisional pain and had received IV dilaudid. Pt reports symptoms of N/V started shortly thereafter receiving IV dilaudid. During this event she had also reported chest pain and shortness of breath. ECG obtained during this event demonstrated sinus bradycardia / w/sinus arrhythmia, rate 56 bpm, incomplete RBBB w/no acute ischemic changes. HS troponin level assessed and was normal.   Repeat BMP later in the evening demonstrated LETICIA w/a creat level up to 1.41 from 0.6. She was initiated on IVF's for hydration.   Cardiology was consulted for further treatment recommendations / management.     Family History:   Family History   Problem Relation Age of Onset    No Known Problems Mother     No Known Problems Father     Diabetes Maternal Grandmother     Cancer Paternal Grandmother      Historical Information   Past Medical History:   Diagnosis Date    Bacterial vaginosis     Bipolar depression (HCC)     Condyloma acuminata     Gastritis     GERD (gastroesophageal reflux disease)     H/O ETOH abuse     Heroin abuse (HCC)     History of cocaine abuse (HCC)     Narcotic abuse (HCC)     Nephrolithiasis     Opioid withdrawal (HCC)     Suicidal ideations     Trichomonal vaginitis 2019    Vulvitis      Past Surgical History:   Procedure Laterality Date    APPENDECTOMY      HERNIA REPAIR      KNEE SURGERY      [place]  DELIVERY ONLY N/A 2023    Procedure:  SECTION ();  Surgeon: Sharifa Lopez DO;  Location: [facility] ;  Service: Obstetrics    URETERAL STENT PLACEMENT       Social History   Social History     Substance and Sexual Activity   Alcohol Use Not Currently    Comment: rarely;  Most recently a couple of shots and few beers     Social History     Substance and Sexual Activity   Drug Use Not Currently    Types: Heroin, Marijuana, Fentanyl, Methamphetamines    Comment: opuate use - last 5 months ago.. One year ago meth and Fentanyl     Social History     Tobacco Use   Smoking Status Some Days     Types: Cigarettes   Smokeless Tobacco Never     Family History:   Family History   Problem Relation Age of Onset    No Known Problems Mother     No Known Problems Father     Diabetes Maternal Grandmother     Cancer Paternal Grandmother        Review of Systems:  Review of Systems   Constitutional:  Negative for activity change, appetite change, chills, fatigue and fever.   Eyes:  Negative for visual disturbance.   Respiratory:  Negative for cough, chest tightness and shortness of breath.    Cardiovascular:  Negative for chest pain, palpitations and leg swelling.   Gastrointestinal:  Negative for abdominal pain.   Neurological:  Negative for dizziness, light-headedness and headaches.   All other systems reviewed and are negative.          Scheduled Meds:  Current Facility-Administered Medications   Medication Dose Route Frequency Provider Last Rate    acetaminophen  975 mg Oral Q6H PRN Best Knott MD      benzocaine-menthol-lanolin-aloe  1 Application Topical Q6H PRN Sharifa John, DO      diphenhydrAMINE  25 mg Oral Q6H PRN Sharifa John, DO      docusate sodium  100 mg Oral BID Sharifa John, DO      enoxaparin  40 mg Subcutaneous Daily Sharifa John, DO      famotidine  20 mg Oral BID Sharifa John, DO      hydrocortisone  1 Application Topical Daily PRN Sharifa John, DO      hydrOXYzine HCL  25 mg Oral Q6H PRN Kimberly Villatoro MD      lactated ringers  125 mL/hr Intravenous Continuous Sharifa John,  mL/hr (12/21/23 0021)    measles-mumps-rubella  0.5 mL Subcutaneous Prior to discharge Sharifa John, DO      medroxyPROGESTERone  150 mg Intramuscular Once Leticia Knott MD      ondansetron  4 mg Oral Q6H PRN Best Knott MD      simethicone  80 mg Oral Q6H PRN Bianca Vela MD      trimethobenzamide  200 mg Intramuscular Q6H PRN Best Pisan, MD      witch hazel-glycerin  1 Pad Topical Q4H PRN Sharifa John, DO       Continuous Infusions:lactated ringers, 125 mL/hr, Last Rate: 125 mL/hr (12/21/23 0021)      PRN Meds:.   acetaminophen    benzocaine-menthol-lanolin-aloe    diphenhydrAMINE    hydrocortisone    hydrOXYzine HCL    measles-mumps-rubella    ondansetron    simethicone    trimethobenzamide    witch hazel-glycerin  all current active meds have been reviewed and current meds:   Current Facility-Administered Medications   Medication Dose Route Frequency    acetaminophen (TYLENOL) tablet 975 mg  975 mg Oral Q6H PRN    benzocaine-menthol-lanolin-aloe (DERMOPLAST) 20-0.5 % topical spray 1 Application  1 Application Topical Q6H PRN    diphenhydrAMINE (BENADRYL) tablet 25 mg  25 mg Oral Q6H PRN    docusate sodium (COLACE) capsule 100 mg  100 mg Oral BID    enoxaparin (LOVENOX) subcutaneous injection 40 mg  40 mg Subcutaneous Daily    famotidine (PEPCID) tablet 20 mg  20 mg Oral BID    hydrocortisone 1 % cream 1 Application  1 Application Topical Daily PRN    hydrOXYzine HCL (ATARAX) tablet 25 mg  25 mg Oral Q6H PRN    lactated ringers infusion  125 mL/hr Intravenous Continuous    measles-mumps-rubella (M-M-R II) subcutaneous injection 0.5 mL  0.5 mL Subcutaneous Prior to discharge    medroxyPROGESTERone (DEPO-PROVERA) IM injection 150 mg  150 mg Intramuscular Once    ondansetron (ZOFRAN-ODT) dispersible tablet 4 mg  4 mg Oral Q6H PRN    simethicone (MYLICON) chewable tablet 80 mg  80 mg Oral Q6H PRN    trimethobenzamide (TIGAN) IM injection 200 mg  200 mg Intramuscular Q6H PRN    witch hazel-glycerin (TUCKS) topical pad 1 Pad  1 Pad Topical Q4H PRN       No Known Allergies    Objective   Vitals: Blood pressure 109/53, pulse 64, temperature 98 °F (36.7 °C), temperature source Oral, resp. rate 18, last menstrual period 03/13/2023, SpO2 95%, not currently breastfeeding., There is no height or weight on file to calculate BMI.,   Orthostatic Blood Pressures      Flowsheet Row Most Recent Value   Blood Pressure 109/53 filed at 12/21/2023 0900   Patient Position - Orthostatic VS Sitting filed at 12/21/2023 0900            No intake or  output data in the 24 hours ending 12/21/23 0907    Invasive Devices       Peripheral Intravenous Line  Duration             Peripheral IV 12/20/23 Distal;Dorsal (posterior);Left Forearm <1 day    Peripheral IV 12/20/23 Distal;Left;Upper;Ventral (anterior) Arm <1 day                  Physical Exam:  Physical Exam  Vitals and nursing note reviewed.   Constitutional:       General: She is not in acute distress.     Appearance: She is not diaphoretic.   HENT:      Head: Normocephalic and atraumatic.   Cardiovascular:      Rate and Rhythm: Normal rate and regular rhythm.   Pulmonary:      Effort: Pulmonary effort is normal.      Breath sounds: Normal breath sounds.   Abdominal:      Palpations: Abdomen is soft.   Musculoskeletal:      Right lower leg: No edema.      Left lower leg: No edema.   Skin:     General: Skin is warm and dry.      Capillary Refill: Capillary refill takes less than 2 seconds.   Neurological:      General: No focal deficit present.      Mental Status: She is alert and oriented to person, place, and time.         Lab Results:   Recent Results (from the past 24 hour(s))   ECG 12 lead    Collection Time: 12/20/23  6:16 PM   Result Value Ref Range    Ventricular Rate 56 BPM    Atrial Rate 56 BPM    DE Interval 108 ms    QRSD Interval 96 ms    QT Interval 424 ms    QTC Interval 409 ms    P [place] 28 degrees    QRS Axis -1 degrees    T Wave Axis 8 degrees   ECG 12 lead    Collection Time: 12/20/23  6:18 PM   Result Value Ref Range    Ventricular Rate 59 BPM    Atrial Rate 59 BPM    DE Interval 114 ms    QRSD Interval 96 ms    QT Interval 422 ms    QTC Interval 417 ms    P [place] 32 degrees    QRS Axis -3 degrees    T Wave Axis 6 degrees   Basic metabolic panel    Collection Time: 12/20/23  8:13 PM   Result Value Ref Range    Sodium 141 135 - 147 mmol/L    Potassium 3.9 3.5 - 5.3 mmol/L    Chloride 106 96 - 108 mmol/L    CO2 25 21 - 32 mmol/L    ANION GAP 10 mmol/L    BUN 18 5 - 25 mg/dL    Creatinine 1.41  "(H) 0.60 - 1.30 mg/dL    Glucose 90 65 - 140 mg/dL    Calcium 8.3 (L) 8.4 - 10.2 mg/dL    eGFR 48 ml/min/1.73sq m   HS Troponin 0hr (reflex protocol)    Collection Time: 12/20/23  8:13 PM   Result Value Ref Range    hs TnI 0hr 6 \"Refer to ACS Flowchart\"- see link ng/L   HS Troponin I 4hr    Collection Time: 12/21/23  2:47 AM   Result Value Ref Range    hs TnI 4hr 8 \"Refer to ACS Flowchart\"- see link ng/L    Delta 4hr hsTnI 2 <20 ng/L   HS Troponin I 2hr    Collection Time: 12/21/23  5:42 AM   Result Value Ref Range    hs TnI 2hr 9 \"Refer to ACS Flowchart\"- see link ng/L    Delta 2hr hsTnI 3 <20 ng/L   Basic metabolic panel    Collection Time: 12/21/23  5:42 AM   Result Value Ref Range    Sodium 140 135 - 147 mmol/L    Potassium 3.7 3.5 - 5.3 mmol/L    Chloride 107 96 - 108 mmol/L    CO2 26 21 - 32 mmol/L    ANION GAP 7 mmol/L    BUN 18 5 - 25 mg/dL    Creatinine 1.40 (H) 0.60 - 1.30 mg/dL    Glucose 90 65 - 140 mg/dL    Calcium 7.8 (L) 8.4 - 10.2 mg/dL    eGFR 49 ml/min/1.73sq m     Imaging: I have personally reviewed pertinent reports.   and I have personally reviewed pertinent films in PACS  Code Status: LVL 1 Full Code  Epic/ [facility] Everywhere records reviewed: Yes    * Please Note: Fluency DirectDictation voice to text software may have been used in the creation of this document. **  "

## 2023-12-21 NOTE — ASSESSMENT & PLAN NOTE
Pt with persistent N/V s/p dilaudid administration  Hx of opioid abuse, per pt clean for 1 year.   Pt reports sxs similar to prior opioid withdrawal, resistant to buprenorphine/naloxone tx.    Initially Improved after Zofran IV, Tigan PRN-> 12/22 symptoms reoccured    Cause unclear, possible dilaudid side effect vs post op N/V. Cardiac and PE w/u neg . Possible acute withdrawal. Low suspicion for acute pyelonephritis (no CVA ttp, afebrile)     Continue antiemetics, IVF

## 2023-12-22 ENCOUNTER — APPOINTMENT (INPATIENT)
Dept: NON INVASIVE DIAGNOSTICS | Facility: HOSPITAL | Age: 33
DRG: 540 | End: 2023-12-22
Payer: COMMERCIAL

## 2023-12-22 ENCOUNTER — APPOINTMENT (INPATIENT)
Dept: CT IMAGING | Facility: HOSPITAL | Age: 33
DRG: 540 | End: 2023-12-22
Payer: COMMERCIAL

## 2023-12-22 LAB
ALBUMIN SERPL BCP-MCNC: 3 G/DL (ref 3.5–5)
ALP SERPL-CCNC: 129 U/L (ref 34–104)
ALT SERPL W P-5'-P-CCNC: 44 U/L (ref 7–52)
ANION GAP SERPL CALCULATED.3IONS-SCNC: 6 MMOL/L
AORTIC ROOT: 3 CM
APICAL FOUR CHAMBER EJECTION FRACTION: 61 %
AST SERPL W P-5'-P-CCNC: 34 U/L (ref 13–39)
BILIRUB SERPL-MCNC: 0.37 MG/DL (ref 0.2–1)
BUN SERPL-MCNC: 15 MG/DL (ref 5–25)
CALCIUM ALBUM COR SERPL-MCNC: 9 MG/DL (ref 8.3–10.1)
CALCIUM SERPL-MCNC: 8.2 MG/DL (ref 8.4–10.2)
CHLORIDE SERPL-SCNC: 107 MMOL/L (ref 96–108)
CO2 SERPL-SCNC: 27 MMOL/L (ref 21–32)
CREAT SERPL-MCNC: 1.44 MG/DL (ref 0.6–1.3)
E WAVE DECELERATION TIME: 183 MS
E/A RATIO: 1.89
ERYTHROCYTE [DISTWIDTH] IN BLOOD BY AUTOMATED COUNT: 14.2 % (ref 11.6–15.1)
FRACTIONAL SHORTENING: 36 (ref 28–44)
GFR SERPL CREATININE-BSD FRML MDRD: 47 ML/MIN/1.73SQ M
GLUCOSE SERPL-MCNC: 99 MG/DL (ref 65–140)
HCT VFR BLD AUTO: 29.8 % (ref 34.8–46.1)
HGB BLD-MCNC: 9.6 G/DL (ref 11.5–15.4)
INTERVENTRICULAR SEPTUM IN DIASTOLE (PARASTERNAL SHORT AXIS VIEW): 1.1 CM
INTERVENTRICULAR SEPTUM: 1.1 CM (ref 0.6–1.1)
LAAS-AP2: 26.8 CM2
LAAS-AP4: 28.1 CM2
LEFT ATRIUM SIZE: 4.3 CM
LEFT ATRIUM VOLUME (MOD BIPLANE): 97 ML
LEFT ATRIUM VOLUME INDEX (MOD BIPLANE): 46.2 ML/M2
LEFT INTERNAL DIMENSION IN SYSTOLE: 3.5 CM (ref 2.1–4)
LEFT VENTRICULAR INTERNAL DIMENSION IN DIASTOLE: 5.5 CM (ref 3.5–6)
LEFT VENTRICULAR POSTERIOR WALL IN END DIASTOLE: 1 CM
LEFT VENTRICULAR STROKE VOLUME: 100 ML
LVSV (TEICH): 100 ML
MCH RBC QN AUTO: 30.3 PG (ref 26.8–34.3)
MCHC RBC AUTO-ENTMCNC: 32.2 G/DL (ref 31.4–37.4)
MCV RBC AUTO: 94 FL (ref 82–98)
MV E'TISSUE VEL-SEP: 10 CM/S
MV PEAK A VEL: 0.55 M/S
MV PEAK E VEL: 104 CM/S
MV STENOSIS PRESSURE HALF TIME: 53 MS
MV VALVE AREA P 1/2 METHOD: 4.15
PLATELET # BLD AUTO: 210 THOUSANDS/UL (ref 149–390)
PMV BLD AUTO: 12 FL (ref 8.9–12.7)
POTASSIUM SERPL-SCNC: 3.8 MMOL/L (ref 3.5–5.3)
PROT SERPL-MCNC: 5.7 G/DL (ref 6.4–8.4)
RBC # BLD AUTO: 3.17 MILLION/UL (ref 3.81–5.12)
RIGHT ATRIAL 2D VOLUME: 47 ML
RIGHT ATRIUM AREA SYSTOLE A4C: 18.1 CM2
RIGHT VENTRICLE ID DIMENSION: 4.1 CM
SL CV LEFT ATRIUM LENGTH A2C: 6 CM
SL CV LV EF: 55
SL CV PED ECHO LEFT VENTRICLE DIASTOLIC VOLUME (MOD BIPLANE) 2D: 150 ML
SL CV PED ECHO LEFT VENTRICLE SYSTOLIC VOLUME (MOD BIPLANE) 2D: 49 ML
SODIUM SERPL-SCNC: 140 MMOL/L (ref 135–147)
TR MAX PG: 30 MMHG
TR PEAK VELOCITY: 2.7 M/S
TRICUSPID ANNULAR PLANE SYSTOLIC EXCURSION: 2.6 CM
TRICUSPID VALVE PEAK REGURGITATION VELOCITY: 2.72 M/S
WBC # BLD AUTO: 11.69 THOUSAND/UL (ref 4.31–10.16)

## 2023-12-22 PROCEDURE — 93306 TTE W/DOPPLER COMPLETE: CPT

## 2023-12-22 PROCEDURE — 99024 POSTOP FOLLOW-UP VISIT: CPT | Performed by: STUDENT IN AN ORGANIZED HEALTH CARE EDUCATION/TRAINING PROGRAM

## 2023-12-22 PROCEDURE — 74178 CT ABD&PLV WO CNTR FLWD CNTR: CPT

## 2023-12-22 PROCEDURE — G1004 CDSM NDSC: HCPCS

## 2023-12-22 PROCEDURE — 93306 TTE W/DOPPLER COMPLETE: CPT | Performed by: INTERNAL MEDICINE

## 2023-12-22 PROCEDURE — 85027 COMPLETE CBC AUTOMATED: CPT | Performed by: STUDENT IN AN ORGANIZED HEALTH CARE EDUCATION/TRAINING PROGRAM

## 2023-12-22 PROCEDURE — 80053 COMPREHEN METABOLIC PANEL: CPT | Performed by: STUDENT IN AN ORGANIZED HEALTH CARE EDUCATION/TRAINING PROGRAM

## 2023-12-22 PROCEDURE — 99254 IP/OBS CNSLTJ NEW/EST MOD 60: CPT | Performed by: UROLOGY

## 2023-12-22 RX ORDER — HEPARIN SODIUM 5000 [USP'U]/ML
5000 INJECTION, SOLUTION INTRAVENOUS; SUBCUTANEOUS EVERY 8 HOURS SCHEDULED
Status: DISCONTINUED | OUTPATIENT
Start: 2023-12-22 | End: 2023-12-23 | Stop reason: HOSPADM

## 2023-12-22 RX ADMIN — TRIMETHOBENZAMIDE HYDROCHLORIDE 200 MG: 100 INJECTION INTRAMUSCULAR at 02:57

## 2023-12-22 RX ADMIN — SODIUM CHLORIDE, SODIUM LACTATE, POTASSIUM CHLORIDE, AND CALCIUM CHLORIDE 1000 ML: .6; .31; .03; .02 INJECTION, SOLUTION INTRAVENOUS at 13:47

## 2023-12-22 RX ADMIN — ONDANSETRON 4 MG: 4 TABLET, ORALLY DISINTEGRATING ORAL at 06:06

## 2023-12-22 RX ADMIN — IOHEXOL 120 ML: 350 INJECTION, SOLUTION INTRAVENOUS at 18:02

## 2023-12-22 RX ADMIN — FAMOTIDINE 20 MG: 20 TABLET ORAL at 09:50

## 2023-12-22 RX ADMIN — HEPARIN SODIUM 5000 UNITS: 5000 INJECTION INTRAVENOUS; SUBCUTANEOUS at 17:32

## 2023-12-22 RX ADMIN — SODIUM CHLORIDE, POTASSIUM CHLORIDE, SODIUM LACTATE AND CALCIUM CHLORIDE 125 ML/HR: 600; 310; 30; 20 INJECTION, SOLUTION INTRAVENOUS at 17:10

## 2023-12-22 RX ADMIN — DOCUSATE SODIUM 100 MG: 100 CAPSULE, LIQUID FILLED ORAL at 09:50

## 2023-12-22 RX ADMIN — FAMOTIDINE 20 MG: 20 TABLET ORAL at 20:55

## 2023-12-22 RX ADMIN — HEPARIN SODIUM 5000 UNITS: 5000 INJECTION INTRAVENOUS; SUBCUTANEOUS at 09:50

## 2023-12-22 RX ADMIN — SODIUM CHLORIDE, POTASSIUM CHLORIDE, SODIUM LACTATE AND CALCIUM CHLORIDE 125 ML/HR: 600; 310; 30; 20 INJECTION, SOLUTION INTRAVENOUS at 01:51

## 2023-12-22 RX ADMIN — DOCUSATE SODIUM 100 MG: 100 CAPSULE, LIQUID FILLED ORAL at 20:55

## 2023-12-22 RX ADMIN — ONDANSETRON 4 MG: 4 TABLET, ORALLY DISINTEGRATING ORAL at 16:59

## 2023-12-22 NOTE — PROGRESS NOTES
Obstetrics Progress Note  Kacey Kern 33 y.o. female MRN: [id number]  Unit/Bed#:  320-01 Encounter: [id number]    Assessment/Plan:    Postpartum Day #4 s/p 1LTCS (AoD) delivery complicated by L extension and uterine artery ligation, postpartum hemorrhage (QBL 1666 cc) and suspected chorio, now s/p 1 dose of Unasyn 3g, currently stable. Overnight, describing worsening pain in her belly, with moderate fundal tenderness. Baby in room. By issue:    * Status post primary low transverse  section  Assessment & Plan       Continue routine post partum care       [id number], HgB 13.2 -> 9.1, received venofer  Pain well controlled: tylenol scheduled, coco prn  Lochia within normal limits: continue to monitor   OOB: as able, encourage ambulation  Passing flatus  Voiding spontaneously  DVT ppx: SCD, Lovenox 40mg  Baby in: nursery  Dispo: anticipate d/c home POD3 vs POD4      Nausea and vomiting  Assessment & Plan  Pt with persistent N/V s/p dilaudid administration  Hx of opioid abuse, per pt clean for 1 year.   Pt reports sxs similar to prior opioid withdrawal, resistant to buprenorphine/naloxone tx.    Initially Improved after Zofran IV, Tigan PRN->  symptoms reoccured    Cause unclear, possible dilaudid side effect vs post op N/V. Cardiac and PE w/u neg . Possible acute withdrawal. Low suspicion for acute pyelonephritis (no CVA ttp, afebrile)     Continue antiemetics, IVF          Chest pain  Assessment & Plan  Patient with intermittent nonradiating chest pain and shortness of breath since the a.m. of 2023.  Now resolved. Pt w hx of Junctional rhythm with ventricular bigeminy likely in the setting of high vagal tone    EKG 2023: Sinus bradycardia at 59 with sinus arrhythmia, incomplete right bundle branch block.  Normal Axis. No QT prolongation. T wave inversions in lead III.  No acute ischemic changes. Unchanged from prior EKG in 2020. EKG not concerning per cardiology  Delta Trop 2,   Unlikely ACS/ HS 3 (low risk), BELLA Risk index low.   BNP <50  CT PE study negative for PE, or other cardiopulmonary process.      Plan:  F/u Cardio consult.         LETICIA (acute kidney injury) (HCC)  Assessment & Plan  12/20: Cr elevated at 1.41 up from 0.61  Likely mixed: pre-renal (2ary to N/V) vs post-renal givne CT A/P - Moderate L hydrouteronephrosis 2ary to uterus enlargement. Additionally had received Toradol for pain   UA unremarkable.     Plan:  -IV reestablished  -1L LR bolus followed by 125mL/hr LR  -Toradol discontinued  Creatine 1.41-> 1.40-> 1.37-> AM bmp pending,   -monitor UO, adequate overnight     Rubella non-immune status, delivered, current hospitalization  Assessment & Plan  MMR ordered    History of surgical biopsy  Assessment & Plan  History of open appendectomy and supraumbilical hernia repair w/ mesh    Tobacco smoking affecting pregnancy in first trimester  Assessment & Plan  She currently smokes about 3 cigarettes/day but denied vaping, marijuana, or other illicit drug use at her initial m visit, given persistent nausea and vomiting consider UDS to rule out Hyperemesis secondary to cannabis    History of opioid abuse ([facility])  Assessment & Plan  She has a past medical history significant for opioid abuse, having most recently used 3 months prior to pregnancy.  She has declined opioid replacement therapy with buprenorphine    Incomplete right bundle branch block  Assessment & Plan  First noted in 2019      EKG 12/20/2023: Sinus bradycardia at 59 with sinus arrhythmia, incomplete right bundle branch block. Normal axis. No QT prolongation. T wave inversions in lead III.  Unchanged from prior EKG in April 2020.    See chest pain A&P for further details.         Bipolar depression (HCC)  Assessment & Plan  Diagnosed after previous pregnancy, not on meds    40 weeks gestation of pregnancy-resolved as of 12/18/2023  Assessment & Plan  -Prenatal labs completed  -28 week labs completed  -EFW  63%        Subjective/Objective     Subjective:   Nause and vomiting had resolved after the Tigan yesterday but overnight it reoccurred again. Zofran helped resolve her symptoms. She has been going into the shower to help with symptoms.  Abdominal pain is tolerable.   Voiding: spontaneously. Flatus: passing. Ambulating: yes. Chest pain: no. Shortness of breath: no. Leg pain: no. Lochia: within normal limits. Baby inroom, feeding via breast.    Objective:   Vitals:   Temp:  [98 °F (36.7 °C)-98.5 °F (36.9 °C)] 98.5 °F (36.9 °C)  HR:  [54-67] 67  Resp:  [16-20] 20  BP: (109-123)/(53-78) 123/78       Intake/Output Summary (Last 24 hours) at 12/22/2023 0636  Last data filed at 12/22/2023 0151  Gross per 24 hour   Intake 1660 ml   Output 925 ml   Net 735 ml       Lab Results   Component Value Date    WBC 17.76 (H) 12/18/2023    HGB 9.1 (L) 12/18/2023    HCT 26.9 (L) 12/18/2023    MCV 91 12/18/2023    PLT 99 (L) 12/18/2023       Physical Exam:   General: alert and oriented x3, in no apparent distress  Cardiovascular: regular rate  Pulmonary: normal effort  Abdomen: Soft, moderate fundal tenderness, non-distended, no rebound or guarding. Uterine fundus firm and non-tender, at the umbilicus  Incision: clean/dry/intact  Extremities: Non tender with mild edema      Radha Dozier MD  PGY-I, OBGYN  12/22/2023, 6:36 AM

## 2023-12-22 NOTE — PLAN OF CARE
Problem: PAIN - ADULT  Goal: Verbalizes/displays adequate comfort level or baseline comfort level  Description: Interventions:  - Encourage patient to monitor pain and request assistance  - Assess pain using appropriate pain scale  - Administer analgesics based on type and severity of pain and evaluate response  - Implement non-pharmacological measures as appropriate and evaluate response  - Consider cultural and social influences on pain and pain management  - Notify physician/advanced practitioner if interventions unsuccessful or patient reports new pain  Outcome: Progressing     Problem: INFECTION - ADULT  Goal: Absence or prevention of progression during hospitalization  Description: INTERVENTIONS:  - Assess and monitor for signs and symptoms of infection  - Monitor lab/diagnostic results  - Monitor all insertion sites, i.e. indwelling lines, tubes, and drains  - Monitor endotracheal if appropriate and nasal secretions for changes in amount and color  - [place] appropriate cooling/warming therapies per order  - Administer medications as ordered  - Instruct and encourage patient and family to use good hand hygiene technique  - Identify and instruct in appropriate isolation precautions for identified infection/condition  Outcome: Progressing  Goal: Absence of fever/infection during neutropenic period  Description: INTERVENTIONS:  - Monitor WBC    Outcome: Progressing     Problem: SAFETY ADULT  Goal: Patient will remain free of falls  Description: INTERVENTIONS:  - Educate patient/family on patient safety including physical limitations  - Instruct patient to call for assistance with activity   - Consult OT/PT to assist with strengthening/mobility   - Keep Call bell within reach  - Keep bed low and locked with side rails adjusted as appropriate  - Keep care items and personal belongings within reach  - Initiate and maintain comfort rounds  - Make Fall Risk Sign visible to staff  - Apply yellow socks and bracelet  for high fall risk patients  - Consider moving patient to room near nurses station  Outcome: Progressing  Goal: Maintain or return to baseline ADL function  Description: INTERVENTIONS:  -  Assess patient's ability to carry out ADLs; assess patient's baseline for ADL function and identify physical deficits which impact ability to perform ADLs (bathing, care of mouth/teeth, toileting, grooming, dressing, etc.)  - Assess/evaluate cause of self-care deficits   - Assess range of motion  - Assess patient's mobility; develop plan if impaired  - Assess patient's need for assistive devices and provide as appropriate  - Encourage maximum independence but intervene and supervise when necessary  - Involve family in performance of ADLs  - Assess for home care needs following discharge   - Consider OT consult to assist with ADL evaluation and planning for discharge  - Provide patient education as appropriate  Outcome: Progressing  Goal: Maintains/Returns to pre admission functional level  Description: INTERVENTIONS:  - Perform AM-PAC 6 Click Basic Mobility/ Daily Activity assessment daily.  - Set and communicate daily mobility goal to care team and patient/family/caregiver.   - Collaborate with rehabilitation services on mobility goals if consulted  - Out of bed for toileting  - Record patient progress and toleration of activity level   Outcome: Progressing     Problem: DISCHARGE PLANNING  Goal: Discharge to home or other facility with appropriate resources  Description: INTERVENTIONS:  - Identify barriers to discharge w/patient and caregiver  - Arrange for needed discharge resources and transportation as appropriate  - Identify discharge learning needs (meds, wound care, etc.)  - Arrange for interpretive services to assist at discharge as needed  - Refer to Case Management Department for coordinating discharge planning if the patient needs post-hospital services based on physician/advanced practitioner order or complex needs  related to functional status, cognitive ability, or social support system  Outcome: Progressing     Problem: POSTPARTUM  Goal: Experiences normal postpartum course  Description: INTERVENTIONS:  - Monitor maternal vital signs  - Assess uterine involution and lochia  Outcome: Progressing  Goal: Appropriate maternal -  bonding  Description: INTERVENTIONS:  - Identify family support  - Assess for appropriate maternal/infant bonding   -Encourage maternal/infant bonding opportunities  - Referral to  or  as needed  Outcome: Progressing  Goal: Establishment of infant feeding pattern  Description: INTERVENTIONS:  - Assess breast/bottle feeding  - Refer to lactation as needed  Outcome: Progressing  Goal: Incision(s), wounds(s) or drain site(s) healing without S/S of infection  Description: INTERVENTIONS  - Assess and document dressing, incision, wound bed, drain sites and surrounding tissue  - Provide patient and family education  Outcome: Progressing

## 2023-12-22 NOTE — QUICK NOTE
Case discussed with urology.  Recommendation at this time is for repeat CT with contrast and delayed imaging to evaluate for possible ureteral obstruction.  Given LETICIA, neurology recommends IV fluid bolus before and after CT.  1 L LR ordered for prior to imaging and 1 L LR ordered for after return to floor.  RN messaged via [employer] with instructions.      D/w Dr. Mayra Krueger MD  Obstetrics & Gynecology PGY-3  12/22/2023  1:32 PM

## 2023-12-22 NOTE — QUICK NOTE
Echo is awaited-if echo looks unremarkable, will see patient as needed.    Please call with questions or concerns

## 2023-12-22 NOTE — CONSULTS
Consult - Urology   Kacey HAVEN Concha 1990, 33 y.o. female MRN: [id number]    Unit/Bed#:  320-01 Encounter: [id number]      Assessment & Plan:  Left hydronephrosis  LETICIA  Postpartum day #4, s/p  complicated by below  - Secondary to enlarged uterus vs possible obstruction from stitch to ligate uterine artery  - Cr elevated at 1.44, baseline 0.6-0.9. Trend kidney function  - Afebrile and VSS  - Reports mild pain in LLQ, 3/10  - Obtain CT contrast and delayed imaging for further evaluation. Continue IV fluids prior to and after imaging   - Will continue to follow and monitor for imaging results.     Subjective:   32 y/o female postpartum day #4 s/p  complicated by L extension and uterine artery ligation, postpartum hemorrhage and suspected chorio with complaints of nausea, vomiting, and worsening pain. Cr noted to be elevated at 1.44. Baseline is around 0.61-0.94. Imaging showing left hydronephrosis secondary to obstruction of distal ureter by enlarged uterus. Spoke with OB service who had some concerns regarding possibility of obstruction secondary to stitch to ligate uterine artery.  She is afebrile and VSS. She describes pain as mild today to the left lower quadrant. Denies any flank pain. Denies any urinary complaints, nausea, vomiting, fever or chills. She does note some blood in urine secondary to vaginal bleeding. She does reportedly have prior history of kidney stones with ureteroscopic intervention in the past. Imaging was negative for urinary tract calculi.     Review of Systems   Constitutional:  Negative for chills and fever.   Respiratory:  Negative for shortness of breath.    Cardiovascular:  Negative for chest pain.   Gastrointestinal:  Positive for abdominal pain. Negative for nausea and vomiting.   Genitourinary:  Positive for vaginal bleeding. Negative for decreased urine volume, difficulty urinating, dysuria, flank pain, frequency, hematuria and pelvic pain.   Neurological:   Negative for dizziness.       Objective:  Vitals: Blood pressure 123/78, pulse 67, temperature 98.5 °F (36.9 °C), temperature source Oral, resp. rate 20, last menstrual period 2023, SpO2 98%, not currently breastfeeding.,There is no height or weight on file to calculate BMI.    Physical Exam  Constitutional:       General: She is not in acute distress.     Appearance: Normal appearance. She is not ill-appearing or toxic-appearing.   HENT:      Head: Normocephalic and atraumatic.      Right Ear: External ear normal.      Left Ear: External ear normal.      Nose: Nose normal.   Eyes:      General: No scleral icterus.     Conjunctiva/sclera: Conjunctivae normal.   Cardiovascular:      Pulses: Normal pulses.   Pulmonary:      Effort: Pulmonary effort is normal.   Musculoskeletal:      Cervical back: Normal range of motion.   Skin:     General: Skin is warm and dry.   Neurological:      General: No focal deficit present.      Mental Status: She is alert and oriented to person, place, and time.   Psychiatric:         Behavior: Behavior normal.         Thought Content: Thought content normal.         Judgment: Judgment normal.      Comments: Tearful at times         Imaging:  CT PULMONARY ANGIOGRAM OF THE CHEST AND CT ABDOMEN AND PELVIS WITH INTRAVENOUS CONTRAST     INDICATION:   Chest pain and shortness of breath close  section.     COMPARISON: 2016.     TECHNIQUE:  CT examination of the chest, abdomen and pelvis was performed.  Thin section CT angiographic technique was used in the chest in order to evaluate for pulmonary embolus and coronal 3D MIP postprocessing was performed on the acquisition   scanner. Multiplanar 2D reformatted images were created from the source data.     This examination, like all CT scans performed in the [facility], was performed utilizing techniques to minimize radiation dose exposure, including the use of iterative reconstruction and automated exposure  control. Radiation dose length   product (DLP) for this visit:  1216 mGy-cm     IV Contrast:  100 mL of iohexol (OMNIPAQUE)  Enteric Contrast:  Enteric contrast was not administered.     FINDINGS:     CHEST     PULMONARY ARTERIAL TREE:  No filling defect in the visualized pulmonary arteries to indicate an acute embolus.     LUNGS:  Lungs are clear.  There is no tracheal or endobronchial lesion.     PLEURA: No effusion.     HEART/AORTA: Mild cardiomegaly. No thoracic aortic aneurysm or dissection.     MEDIASTINUM AND SID: No lymphadenopathy.     CHEST WALL AND LOWER NECK:  Unremarkable.     ABDOMEN     LIVER/BILIARY TREE:  Unremarkable.     GALLBLADDER:  No calcified gallstones. No pericholecystic inflammatory change.     SPLEEN: Subcentimeter cyst.     PANCREAS:  Unremarkable.     ADRENAL GLANDS:  Unremarkable.     KIDNEYS/URETERS: Moderate left hydronephrosis and hydroureter likely secondary to compression of the distal ureter by the uterus. Mildly delayed left nephrogram.     STOMACH AND BOWEL: Small hiatal hernia. No evidence of bowel obstruction, colitis or diverticulitis.     APPENDIX:  No findings to suggest appendicitis.     ABDOMINOPELVIC CAVITY:  No free intraperitoneal air or fluid collection.     VESSELS: Patent portal, splenic and mesenteric veins.     PELVIS     REPRODUCTIVE ORGANS: Enlarged postpartum uterus. Few foci of gas in the endometrial cavity within the lower uterine segment.     URINARY BLADDER: Collapsed, limiting evaluation.     ABDOMINAL WALL/INGUINAL REGIONS:  Unremarkable.     OSSEOUS STRUCTURES:  No acute fracture or destructive osseous lesion.     IMPRESSION:        1. No evidence of acute pulmonary embolus, thoracic aortic aneurysm or dissection. No acute cardiopulmonary process.  2. Postoperative change from  section. Few foci of gas within the endometrial cavity in the lower uterine segment.  3. Moderate left hydronephrosis and hydroureter. Obstruction of the distal ureter  "by the enlarged uterus. Delayed left nephrogram. Pyelonephritis not excluded.     Narrative & Impression   RENAL ULTRASOUND     INDICATION:   LETICIA and hydronephrosis on CT, s/p  section delivery.     COMPARISON: CT chest abdomen pelvis 2023     TECHNIQUE:   Ultrasound of the retroperitoneum was performed with a curvilinear transducer utilizing volumetric sweeps and still imaging techniques.     FINDINGS:     KIDNEYS:  Symmetric and normal size.  Right kidney:  12.0 x 4.6 x 5.9 cm. Volume 173.0 mL  Left kidney:  12.3 x 7.1 x 5.8 cm.  Volume 265.3 mL     Right kidney  Normal echogenicity and contour.  No mass is identified.  No hydronephrosis.  No shadowing calculi.  No perinephric fluid collections.     Left kidney  Normal echogenicity and contour.  No mass is identified.  Mild to moderate hydronephrosis. No shadowing calculi.  No perinephric fluid collections.     URETERS:  Nonvisualized.     BLADDER:  Normally distended.  No focal thickening or mass lesions.  Bilateral ureteral jets not detected.           IMPRESSION:  Mild to moderate left hydronephrosis as seen on the previous CT            Labs:  No results for input(s): \"WBC\" in the last 72 hours.  No results for input(s): \"HGB\" in the last 72 hours.    Recent Labs     23  2013 23  0542 23  1304 23  0616   CREATININE 1.41* 1.40* 1.37* 1.44*         History:  Social History     Socioeconomic History    Marital status: Single     Spouse name: None    Number of children: None    Years of education: None    Highest education level: None   Occupational History    None   Tobacco Use    Smoking status: Some Days     Types: Cigarettes    Smokeless tobacco: Never   Vaping Use    Vaping status: Never Used   Substance and Sexual Activity    Alcohol use: Not Currently     Comment: rarely;  Most recently a couple of shots and few beers    Drug use: Not Currently     Types: Heroin, Marijuana, Fentanyl, Methamphetamines     Comment: opuate " use - last 5 months ago.. One year ago meth and Fentanyl    Sexual activity: Yes     Partners: Male     Birth control/protection: None   Other Topics Concern    None   Social History Narrative    None     Social Determinants of Health     Financial Resource Strain: Not on file   Food Insecurity: Not on file   Transportation Needs: Not on file   Physical Activity: Not on file   Stress: Not on file   Social Connections: Not on file   Intimate Partner Violence: Not on file   Housing Stability: Not on file       Past Medical History:   Diagnosis Date    Bacterial vaginosis     Bipolar depression (HCC)     Condyloma acuminata     Gastritis     GERD (gastroesophageal reflux disease)     H/O ETOH abuse     Heroin abuse (HCC)     History of cocaine abuse (HCC)     Narcotic abuse (HCC)     Nephrolithiasis     Opioid withdrawal (HCC)     Suicidal ideations     Trichomonal vaginitis 2019    Vulvitis      Past Surgical History:   Procedure Laterality Date    APPENDECTOMY      HERNIA REPAIR      KNEE SURGERY      MS  DELIVERY ONLY N/A 2023    Procedure:  SECTION ();  Surgeon: Sharifa Lopez DO;  Location: [facility] ;  Service: Obstetrics    URETERAL STENT PLACEMENT       Family History   Problem Relation Age of Onset    No Known Problems Mother     No Known Problems Father     Diabetes Maternal Grandmother     Cancer Paternal Grandmother        Kimberly Emery PA-C  Date: 2023 Time: 7:54 AM

## 2023-12-22 NOTE — PLAN OF CARE
Problem: PAIN - ADULT  Goal: Verbalizes/displays adequate comfort level or baseline comfort level  Description: Interventions:  - Encourage patient to monitor pain and request assistance  - Assess pain using appropriate pain scale  - Administer analgesics based on type and severity of pain and evaluate response  - Implement non-pharmacological measures as appropriate and evaluate response  - Consider cultural and social influences on pain and pain management  - Notify physician/advanced practitioner if interventions unsuccessful or patient reports new pain  Outcome: Progressing     Problem: INFECTION - ADULT  Goal: Absence or prevention of progression during hospitalization  Description: INTERVENTIONS:  - Assess and monitor for signs and symptoms of infection  - Monitor lab/diagnostic results  - Monitor all insertion sites, i.e. indwelling lines, tubes, and drains  - Monitor endotracheal if appropriate and nasal secretions for changes in amount and color  - [place] appropriate cooling/warming therapies per order  - Administer medications as ordered  - Instruct and encourage patient and family to use good hand hygiene technique  - Identify and instruct in appropriate isolation precautions for identified infection/condition  Outcome: Progressing  Goal: Absence of fever/infection during neutropenic period  Description: INTERVENTIONS:  - Monitor WBC    Outcome: Progressing     Problem: SAFETY ADULT  Goal: Patient will remain free of falls  Description: INTERVENTIONS:  - Educate patient/family on patient safety including physical limitations  - Instruct patient to call for assistance with activity   - Consult OT/PT to assist with strengthening/mobility   - Keep Call bell within reach  - Keep bed low and locked with side rails adjusted as appropriate  - Keep care items and personal belongings within reach  - Initiate and maintain comfort rounds  - Make Fall Risk Sign visible to staff  - Apply yellow socks and bracelet  for high fall risk patients  - Consider moving patient to room near nurses station  Outcome: Progressing  Goal: Maintain or return to baseline ADL function  Description: INTERVENTIONS:  -  Assess patient's ability to carry out ADLs; assess patient's baseline for ADL function and identify physical deficits which impact ability to perform ADLs (bathing, care of mouth/teeth, toileting, grooming, dressing, etc.)  - Assess/evaluate cause of self-care deficits   - Assess range of motion  - Assess patient's mobility; develop plan if impaired  - Assess patient's need for assistive devices and provide as appropriate  - Encourage maximum independence but intervene and supervise when necessary  - Involve family in performance of ADLs  - Assess for home care needs following discharge   - Consider OT consult to assist with ADL evaluation and planning for discharge  - Provide patient education as appropriate  Outcome: Progressing  Goal: Maintains/Returns to pre admission functional level  Description: INTERVENTIONS:  - Perform AM-PAC 6 Click Basic Mobility/ Daily Activity assessment daily.  - Set and communicate daily mobility goal to care team and patient/family/caregiver.   - Collaborate with rehabilitation services on mobility goals if consulted  - Out of bed for toileting  - Record patient progress and toleration of activity level   Outcome: Progressing     Problem: DISCHARGE PLANNING  Goal: Discharge to home or other facility with appropriate resources  Description: INTERVENTIONS:  - Identify barriers to discharge w/patient and caregiver  - Arrange for needed discharge resources and transportation as appropriate  - Identify discharge learning needs (meds, wound care, etc.)  - Arrange for interpretive services to assist at discharge as needed  - Refer to Case Management Department for coordinating discharge planning if the patient needs post-hospital services based on physician/advanced practitioner order or complex needs  related to functional status, cognitive ability, or social support system  Outcome: Progressing     Problem: POSTPARTUM  Goal: Experiences normal postpartum course  Description: INTERVENTIONS:  - Monitor maternal vital signs  - Assess uterine involution and lochia  Outcome: Progressing  Goal: Appropriate maternal -  bonding  Description: INTERVENTIONS:  - Identify family support  - Assess for appropriate maternal/infant bonding   -Encourage maternal/infant bonding opportunities  - Referral to  or  as needed  Outcome: Progressing  Goal: Establishment of infant feeding pattern  Description: INTERVENTIONS:  - Assess breast/bottle feeding  - Refer to lactation as needed  Outcome: Progressing  Goal: Incision(s), wounds(s) or drain site(s) healing without S/S of infection  Description: INTERVENTIONS  - Assess and document dressing, incision, wound bed, drain sites and surrounding tissue  - Provide patient and family education    Outcome: Progressing

## 2023-12-23 ENCOUNTER — ANESTHESIA EVENT (INPATIENT)
Dept: PERIOP | Facility: HOSPITAL | Age: 33
DRG: 540 | End: 2023-12-23
Payer: COMMERCIAL

## 2023-12-23 ENCOUNTER — APPOINTMENT (INPATIENT)
Dept: RADIOLOGY | Facility: HOSPITAL | Age: 33
DRG: 540 | End: 2023-12-23
Payer: COMMERCIAL

## 2023-12-23 ENCOUNTER — ANESTHESIA (INPATIENT)
Dept: PERIOP | Facility: HOSPITAL | Age: 33
DRG: 540 | End: 2023-12-23
Payer: COMMERCIAL

## 2023-12-23 VITALS
OXYGEN SATURATION: 98 % | DIASTOLIC BLOOD PRESSURE: 88 MMHG | HEIGHT: 65 IN | RESPIRATION RATE: 18 BRPM | HEART RATE: 52 BPM | BODY MASS INDEX: 38.2 KG/M2 | TEMPERATURE: 97.9 F | SYSTOLIC BLOOD PRESSURE: 133 MMHG | WEIGHT: 229.28 LBS

## 2023-12-23 LAB
ALBUMIN SERPL BCP-MCNC: 3.1 G/DL (ref 3.5–5)
ALP SERPL-CCNC: 121 U/L (ref 34–104)
ALT SERPL W P-5'-P-CCNC: 31 U/L (ref 7–52)
ANION GAP SERPL CALCULATED.3IONS-SCNC: 7 MMOL/L
AST SERPL W P-5'-P-CCNC: 18 U/L (ref 13–39)
BASOPHILS # BLD AUTO: 0.02 THOUSANDS/ÂΜL (ref 0–0.1)
BASOPHILS NFR BLD AUTO: 0 % (ref 0–1)
BILIRUB SERPL-MCNC: 0.47 MG/DL (ref 0.2–1)
BUN SERPL-MCNC: 14 MG/DL (ref 5–25)
CALCIUM ALBUM COR SERPL-MCNC: 9.1 MG/DL (ref 8.3–10.1)
CALCIUM SERPL-MCNC: 8.4 MG/DL (ref 8.4–10.2)
CHLORIDE SERPL-SCNC: 106 MMOL/L (ref 96–108)
CO2 SERPL-SCNC: 28 MMOL/L (ref 21–32)
CREAT SERPL-MCNC: 1.49 MG/DL (ref 0.6–1.3)
EOSINOPHIL # BLD AUTO: 0.13 THOUSAND/ÂΜL (ref 0–0.61)
EOSINOPHIL NFR BLD AUTO: 2 % (ref 0–6)
ERYTHROCYTE [DISTWIDTH] IN BLOOD BY AUTOMATED COUNT: 14.2 % (ref 11.6–15.1)
GFR SERPL CREATININE-BSD FRML MDRD: 45 ML/MIN/1.73SQ M
GLUCOSE SERPL-MCNC: 85 MG/DL (ref 65–140)
HCT VFR BLD AUTO: 26.1 % (ref 34.8–46.1)
HGB BLD-MCNC: 8.6 G/DL (ref 11.5–15.4)
IMM GRANULOCYTES # BLD AUTO: 0.16 THOUSAND/UL (ref 0–0.2)
IMM GRANULOCYTES NFR BLD AUTO: 2 % (ref 0–2)
LYMPHOCYTES # BLD AUTO: 1.38 THOUSANDS/ÂΜL (ref 0.6–4.47)
LYMPHOCYTES NFR BLD AUTO: 18 % (ref 14–44)
MCH RBC QN AUTO: 30.1 PG (ref 26.8–34.3)
MCHC RBC AUTO-ENTMCNC: 33 G/DL (ref 31.4–37.4)
MCV RBC AUTO: 91 FL (ref 82–98)
MONOCYTES # BLD AUTO: 0.3 THOUSAND/ÂΜL (ref 0.17–1.22)
MONOCYTES NFR BLD AUTO: 4 % (ref 4–12)
NEUTROPHILS # BLD AUTO: 5.63 THOUSANDS/ÂΜL (ref 1.85–7.62)
NEUTS SEG NFR BLD AUTO: 74 % (ref 43–75)
NRBC BLD AUTO-RTO: 0 /100 WBCS
PLATELET # BLD AUTO: 211 THOUSANDS/UL (ref 149–390)
PMV BLD AUTO: 11.6 FL (ref 8.9–12.7)
POTASSIUM SERPL-SCNC: 3.8 MMOL/L (ref 3.5–5.3)
PROT SERPL-MCNC: 5.7 G/DL (ref 6.4–8.4)
RBC # BLD AUTO: 2.86 MILLION/UL (ref 3.81–5.12)
SODIUM SERPL-SCNC: 141 MMOL/L (ref 135–147)
WBC # BLD AUTO: 7.62 THOUSAND/UL (ref 4.31–10.16)

## 2023-12-23 PROCEDURE — C2617 STENT, NON-COR, TEM W/O DEL: HCPCS | Performed by: UROLOGY

## 2023-12-23 PROCEDURE — C1769 GUIDE WIRE: HCPCS | Performed by: UROLOGY

## 2023-12-23 PROCEDURE — 74420 UROGRAPHY RTRGR +-KUB: CPT

## 2023-12-23 PROCEDURE — 0T778DZ DILATION OF LEFT URETER WITH INTRALUMINAL DEVICE, VIA NATURAL OR ARTIFICIAL OPENING ENDOSCOPIC: ICD-10-PCS | Performed by: UROLOGY

## 2023-12-23 PROCEDURE — 52332 CYSTOSCOPY AND TREATMENT: CPT | Performed by: UROLOGY

## 2023-12-23 PROCEDURE — 85025 COMPLETE CBC W/AUTO DIFF WBC: CPT

## 2023-12-23 PROCEDURE — 99232 SBSQ HOSP IP/OBS MODERATE 35: CPT | Performed by: UROLOGY

## 2023-12-23 PROCEDURE — C1758 CATHETER, URETERAL: HCPCS | Performed by: UROLOGY

## 2023-12-23 PROCEDURE — 80053 COMPREHEN METABOLIC PANEL: CPT

## 2023-12-23 PROCEDURE — BT1F1ZZ FLUOROSCOPY OF LEFT KIDNEY, URETER AND BLADDER USING LOW OSMOLAR CONTRAST: ICD-10-PCS | Performed by: UROLOGY

## 2023-12-23 PROCEDURE — 99024 POSTOP FOLLOW-UP VISIT: CPT | Performed by: STUDENT IN AN ORGANIZED HEALTH CARE EDUCATION/TRAINING PROGRAM

## 2023-12-23 PROCEDURE — NC001 PR NO CHARGE: Performed by: STUDENT IN AN ORGANIZED HEALTH CARE EDUCATION/TRAINING PROGRAM

## 2023-12-23 DEVICE — INLAY OPTIMA URETERAL STENT W/O GUIDEWIRE
Type: IMPLANTABLE DEVICE | Site: URETER | Status: FUNCTIONAL
Brand: BARD® INLAY OPTIMA® URETERAL STENT

## 2023-12-23 RX ORDER — HYDROMORPHONE HCL/PF 1 MG/ML
0.5 SYRINGE (ML) INJECTION
Status: DISCONTINUED | OUTPATIENT
Start: 2023-12-23 | End: 2023-12-23 | Stop reason: HOSPADM

## 2023-12-23 RX ORDER — LIDOCAINE HYDROCHLORIDE 10 MG/ML
INJECTION, SOLUTION EPIDURAL; INFILTRATION; INTRACAUDAL; PERINEURAL AS NEEDED
Status: DISCONTINUED | OUTPATIENT
Start: 2023-12-23 | End: 2023-12-23

## 2023-12-23 RX ORDER — FENTANYL CITRATE/PF 50 MCG/ML
25 SYRINGE (ML) INJECTION
Status: DISCONTINUED | OUTPATIENT
Start: 2023-12-23 | End: 2023-12-23 | Stop reason: HOSPADM

## 2023-12-23 RX ORDER — DEXAMETHASONE SODIUM PHOSPHATE 10 MG/ML
INJECTION, SOLUTION INTRAMUSCULAR; INTRAVENOUS AS NEEDED
Status: DISCONTINUED | OUTPATIENT
Start: 2023-12-23 | End: 2023-12-23

## 2023-12-23 RX ORDER — MIDAZOLAM HYDROCHLORIDE 2 MG/2ML
INJECTION, SOLUTION INTRAMUSCULAR; INTRAVENOUS AS NEEDED
Status: DISCONTINUED | OUTPATIENT
Start: 2023-12-23 | End: 2023-12-23

## 2023-12-23 RX ORDER — PROPOFOL 10 MG/ML
INJECTION, EMULSION INTRAVENOUS AS NEEDED
Status: DISCONTINUED | OUTPATIENT
Start: 2023-12-23 | End: 2023-12-23

## 2023-12-23 RX ORDER — OXYCODONE HYDROCHLORIDE 5 MG/1
5 TABLET ORAL EVERY 4 HOURS PRN
Qty: 8 TABLET | Refills: 0 | Status: CANCELLED | OUTPATIENT
Start: 2023-12-23 | End: 2024-01-02

## 2023-12-23 RX ORDER — ONDANSETRON 2 MG/ML
4 INJECTION INTRAMUSCULAR; INTRAVENOUS ONCE AS NEEDED
Status: DISCONTINUED | OUTPATIENT
Start: 2023-12-23 | End: 2023-12-23 | Stop reason: HOSPADM

## 2023-12-23 RX ORDER — ACETAMINOPHEN 325 MG/1
650 TABLET ORAL EVERY 6 HOURS PRN
Start: 2023-12-23

## 2023-12-23 RX ORDER — FENTANYL CITRATE 50 UG/ML
INJECTION, SOLUTION INTRAMUSCULAR; INTRAVENOUS AS NEEDED
Status: DISCONTINUED | OUTPATIENT
Start: 2023-12-23 | End: 2023-12-23

## 2023-12-23 RX ORDER — ONDANSETRON 2 MG/ML
INJECTION INTRAMUSCULAR; INTRAVENOUS AS NEEDED
Status: DISCONTINUED | OUTPATIENT
Start: 2023-12-23 | End: 2023-12-23

## 2023-12-23 RX ORDER — MAGNESIUM HYDROXIDE 1200 MG/15ML
LIQUID ORAL AS NEEDED
Status: DISCONTINUED | OUTPATIENT
Start: 2023-12-23 | End: 2023-12-23 | Stop reason: HOSPADM

## 2023-12-23 RX ORDER — CEFAZOLIN SODIUM 1 G/3ML
INJECTION, POWDER, FOR SOLUTION INTRAMUSCULAR; INTRAVENOUS AS NEEDED
Status: DISCONTINUED | OUTPATIENT
Start: 2023-12-23 | End: 2023-12-23

## 2023-12-23 RX ADMIN — PROPOFOL 200 MG: 10 INJECTION, EMULSION INTRAVENOUS at 15:37

## 2023-12-23 RX ADMIN — LIDOCAINE HYDROCHLORIDE 40 MG: 10 INJECTION, SOLUTION EPIDURAL; INFILTRATION; INTRACAUDAL; PERINEURAL at 15:37

## 2023-12-23 RX ADMIN — SODIUM CHLORIDE, POTASSIUM CHLORIDE, SODIUM LACTATE AND CALCIUM CHLORIDE: 600; 310; 30; 20 INJECTION, SOLUTION INTRAVENOUS at 15:02

## 2023-12-23 RX ADMIN — MEDROXYPROGESTERONE ACETATE 150 MG: 150 INJECTION, SUSPENSION INTRAMUSCULAR at 17:26

## 2023-12-23 RX ADMIN — SODIUM CHLORIDE, POTASSIUM CHLORIDE, SODIUM LACTATE AND CALCIUM CHLORIDE 125 ML/HR: 600; 310; 30; 20 INJECTION, SOLUTION INTRAVENOUS at 09:30

## 2023-12-23 RX ADMIN — SODIUM CHLORIDE, POTASSIUM CHLORIDE, SODIUM LACTATE AND CALCIUM CHLORIDE: 600; 310; 30; 20 INJECTION, SOLUTION INTRAVENOUS at 16:03

## 2023-12-23 RX ADMIN — MIDAZOLAM 2 MG: 1 INJECTION INTRAMUSCULAR; INTRAVENOUS at 15:32

## 2023-12-23 RX ADMIN — DEXAMETHASONE SODIUM PHOSPHATE 10 MG: 10 INJECTION, SOLUTION INTRAMUSCULAR; INTRAVENOUS at 15:40

## 2023-12-23 RX ADMIN — ONDANSETRON 4 MG: 2 INJECTION INTRAMUSCULAR; INTRAVENOUS at 15:40

## 2023-12-23 RX ADMIN — FENTANYL CITRATE 50 MCG: 50 INJECTION INTRAMUSCULAR; INTRAVENOUS at 15:37

## 2023-12-23 RX ADMIN — CEFAZOLIN 2000 MG: 1 INJECTION, POWDER, FOR SOLUTION INTRAMUSCULAR; INTRAVENOUS at 15:45

## 2023-12-23 NOTE — PLAN OF CARE
Problem: PAIN - ADULT  Goal: Verbalizes/displays adequate comfort level or baseline comfort level  Description: Interventions:  - Encourage patient to monitor pain and request assistance  - Assess pain using appropriate pain scale  - Administer analgesics based on type and severity of pain and evaluate response  - Implement non-pharmacological measures as appropriate and evaluate response  - Consider cultural and social influences on pain and pain management  - Notify physician/advanced practitioner if interventions unsuccessful or patient reports new pain  Outcome: Progressing     Problem: INFECTION - ADULT  Goal: Absence or prevention of progression during hospitalization  Description: INTERVENTIONS:  - Assess and monitor for signs and symptoms of infection  - Monitor lab/diagnostic results  - Monitor all insertion sites, i.e. indwelling lines, tubes, and drains  - Monitor endotracheal if appropriate and nasal secretions for changes in amount and color  - [place] appropriate cooling/warming therapies per order  - Administer medications as ordered  - Instruct and encourage patient and family to use good hand hygiene technique  - Identify and instruct in appropriate isolation precautions for identified infection/condition  Outcome: Progressing  Goal: Absence of fever/infection during neutropenic period  Description: INTERVENTIONS:  - Monitor WBC    Outcome: Progressing     Problem: SAFETY ADULT  Goal: Patient will remain free of falls  Description: INTERVENTIONS:  - Educate patient/family on patient safety including physical limitations  - Instruct patient to call for assistance with activity   - Consult OT/PT to assist with strengthening/mobility   - Keep Call bell within reach  - Keep bed low and locked with side rails adjusted as appropriate  - Keep care items and personal belongings within reach  - Initiate and maintain comfort rounds  - Make Fall Risk Sign visible to staff  - Offer Toileting every  Hours,  in advance of need  - Initiate/Maintain alarm  - Obtain necessary fall risk management equipment:   - Apply yellow socks and bracelet for high fall risk patients  - Consider moving patient to room near nurses station  Outcome: Progressing  Goal: Maintain or return to baseline ADL function  Description: INTERVENTIONS:  -  Assess patient's ability to carry out ADLs; assess patient's baseline for ADL function and identify physical deficits which impact ability to perform ADLs (bathing, care of mouth/teeth, toileting, grooming, dressing, etc.)  - Assess/evaluate cause of self-care deficits   - Assess range of motion  - Assess patient's mobility; develop plan if impaired  - Assess patient's need for assistive devices and provide as appropriate  - Encourage maximum independence but intervene and supervise when necessary  - Involve family in performance of ADLs  - Assess for home care needs following discharge   - Consider OT consult to assist with ADL evaluation and planning for discharge  - Provide patient education as appropriate  Outcome: Progressing  Goal: Maintains/Returns to pre admission functional level  Description: INTERVENTIONS:  - Perform AM-PAC 6 Click Basic Mobility/ Daily Activity assessment daily.  - Set and communicate daily mobility goal to care team and patient/family/caregiver.   - Collaborate with rehabilitation services on mobility goals if consulted  - Perform Range of Motion  times a day.  - Reposition patient every  hours.  - Dangle patient  times a day  - Stand patient  times a day  - Ambulate patient  times a day  - Out of bed to chair  times a day   - Out of bed for meals  times a day  - Out of bed for toileting  - Record patient progress and toleration of activity level   Outcome: Progressing     Problem: DISCHARGE PLANNING  Goal: Discharge to home or other facility with appropriate resources  Description: INTERVENTIONS:  - Identify barriers to discharge w/patient and caregiver  - Arrange for  needed discharge resources and transportation as appropriate  - Identify discharge learning needs (meds, wound care, etc.)  - Arrange for interpretive services to assist at discharge as needed  - Refer to Case Management Department for coordinating discharge planning if the patient needs post-hospital services based on physician/advanced practitioner order or complex needs related to functional status, cognitive ability, or social support system  Outcome: Progressing     Problem: POSTPARTUM  Goal: Experiences normal postpartum course  Description: INTERVENTIONS:  - Monitor maternal vital signs  - Assess uterine involution and lochia  Outcome: Progressing  Goal: Appropriate maternal -  bonding  Description: INTERVENTIONS:  - Identify family support  - Assess for appropriate maternal/infant bonding   -Encourage maternal/infant bonding opportunities  - Referral to  or  as needed  Outcome: Progressing  Goal: Establishment of infant feeding pattern  Description: INTERVENTIONS:  - Assess breast/bottle feeding  - Refer to lactation as needed  Outcome: Progressing  Goal: Incision(s), wounds(s) or drain site(s) healing without S/S of infection  Description: INTERVENTIONS  - Assess and document dressing, incision, wound bed, drain sites and surrounding tissue  - Provide patient and family education  - Perform skin care/dressing changes every   Outcome: Progressing

## 2023-12-23 NOTE — LACTATION NOTE
This note was copied from a baby's chart.  Met with Kacey who has decided to exclusively formula/bottle feed. She was given information on drying up her milk supply.  She has no questions at this time. Encouraged her to call if she has any questions.

## 2023-12-23 NOTE — OP NOTE
OPERATIVE REPORT  PATIENT NAME: Kacey Kern    :  1990  MRN: [id number]  Pt Location: AN OR ROOM 04    SURGERY DATE: 2023    Surgeons and Role:     * Tuan Salas MD - Primary    Preop Diagnosis:  Hydronephrosis of left kidney [N13.30]    Post-Op Diagnosis Codes:     * Hydronephrosis of left kidney [N13.30]    Procedure(s):  Left - CYSTOSCOPY RETROGRADE PYELOGRAM WITH INSERTION STENT URETERAL    Specimen(s):  * No specimens in log *    Estimated Blood Loss:   Minimal    Drains:  Ureteral Internal Stent Left ureter 6 Fr. (Active)   Number of days: 0       Anesthesia Type:   General    Operative Indications:  Patient underwent  significant for left uterine artery ligation  Patient developed acute kidney injury after her  and ultrasound suggested left hydronephrosis.  CT IVP obtained showing mild left-sided hydronephrosis down to enlarged uterus.  Patient denied left-sided flank pain.  Given the change in kidney function and imaging findings there was some concern for potential obstruction and patient elected for cystoscopy with retrograde pyelogram and potential stent placement (rather than observation with short interval repeat imaging)    Operative Findings:  Bladder with cystitis changes likely from a catheter  Left retrograde pyeloureterograms showed a 5 mm area of significant tapering in the distal ureter approximately 3 cm from the ureteral orifice which was a transition point as ureter was mildly to moderately dilated proximal to this.  6 x 24 JJ stent placed with minimal resistance.    Complications:   None    Procedure and Technique:  The patient was brought to the operating room.  Anesthesia was induced.  They were moved to dorsal lithotomy position.  Antibiotics were confirmed.  A time-out was performed identifying the correct patient, site, and procedure.     A rigid 22 German cystoscope was introduced into the bladder.  The urethra was unremarkable.  The bladder was  quickly surveyed and without any significant abnormalities.  A  film was taken which did not show evidence of stone.    Attention was turned to the left ureteral orifice.  A 5 Czech open-end catheter was used to intubate the ureteral orifice.  A a series of gentle retrograde pyeloureterogram was performed which showed a a 5 mm section of tight distal ureter approximately 3 cm proximal to the ureteral orifice.  This area of tapered ureter was a transition point as the ureter appeared moderately dilated proximal to it.      A Solo wire was passed through the open-ended catheter up the ureter into the left renal pelvis under fluoroscopic guidance.  The 5 Czech open-ended catheter was removed.  A 6x 24 stent was placed over the wire under visual guidance in the bladder and fluoroscopic guidance proximally.  Once seen to be in appropriate position the wire was removed and a gentle half coil was seen into the lower pole of the  collecting system on fluoroscopy and visually in the bladder.  The stent was attempted to be manipulated to form a more formal coil in the collecting system but appeared recalcitrant to this so was not manipulated further.  The stent string was not left on.    The bladder was drained. This completed the case.  The patient was woken from anesthesia and transferred to PACU in good condition.    PLAN:  Patient will be arranged for outpatient Urology follow-up in which she will discuss ureteroscopy in 4 weeks versus stent pull 4 weeks with follow-up imaging     A qualified resident physician was not available.    Patient Disposition:  PACU         SIGNATURE: Tuan Salas MD  DATE: December 23, 2023  TIME: 4:00 PM

## 2023-12-23 NOTE — PROGRESS NOTES
Obstetrics Progress Note  Kacey Kern 33 y.o. female MRN: [id number]  Unit/Bed#: -01 Encounter: [id number]    Assessment/Plan:    Postpartum Day #5 s/p 1LTCS (AoD) delivery complicated by L extension and uterine artery ligation, postpartum hemorrhage (QBL 1666 cc) and suspected chorio. With LETICIA. Baby in room. By issue:    Nausea and vomiting  Assessment & Plan  Pt with persistent N/V s/p dilaudid administration  Hx of opioid abuse, per pt clean for 1 year.   Pt reports sxs similar to prior opioid withdrawal, resistant to buprenorphine/naloxone tx.    Initially Improved after Zofran IV, Tigan PRN-> 12/22 symptoms reoccured    Cause unclear, possible dilaudid side effect vs post op N/V. Cardiac and PE w/u neg . Possible acute withdrawal. Low suspicion for acute pyelonephritis (no CVA ttp, afebrile)     Continue antiemetics, IVF          Chest pain  Assessment & Plan  Patient with intermittent nonradiating chest pain and shortness of breath since the a.m. of 12/20/2023.  Now resolved. Pt w hx of Junctional rhythm with ventricular bigeminy likely in the setting of high vagal tone    EKG 12/20/2023: Sinus bradycardia at 59 with sinus arrhythmia, incomplete right bundle branch block.  Normal Axis. No QT prolongation. T wave inversions in lead III.  No acute ischemic changes. Unchanged from prior EKG in April 2020. EKG not concerning per cardiology  Delta Trop 2,  Unlikely ACS/ HS 3 (low risk), BELLA Risk index low.   BNP <50  CT PE study negative for PE, or other cardiopulmonary process.      Plan:  F/u Cardio consult.         LETICIA (acute kidney injury) (HCC)  Assessment & Plan  12/20: Cr elevated at 1.41 up from 0.61  CT A/P - Moderate L hydrouteronephrosis 2ary to uterus enlargement. UA unremarkable.   CT renal: Mild left hydronephrosis and hydroureter involving the proximal 3 cm of the ureter with a transition where the left ureter crosses posterior to an enlarged left ovarian vein which is causing some level  "of obstruction. Distal to this point, the ureter is not well visualized, and does not opacify with contrast.   Enlarged uterus and heterogeneous endometrium with few foci of gas in the endocervical canal and in the vagina, not unexpected in the early postoperative period.    Creatine 1.41-> 1.40-> 1.37-> 1.44 > AM bmp pending    Plan:  -IV reestablished  -1L LR bolus followed by 125mL/hr LR  -Toradol discontinued  -monitor urine output  - Per Urology: plan for observation versus stent placement versus PCN placement   - NPO at midnight      Rubella non-immune status, delivered, current hospitalization  Assessment & Plan  MMR ordered    History of surgical biopsy  Assessment & Plan  History of open appendectomy and supraumbilical hernia repair w/ mesh    Tobacco smoking affecting pregnancy in first trimester  Assessment & Plan  She currently smokes about 3 cigarettes/day but denied vaping, marijuana, or other illicit drug use at her initial [facility] visit, given persistent nausea and vomiting consider UDS to rule out Hyperemesis secondary to cannabis    History of opioid abuse (HCC)  Assessment & Plan  She has a past medical history significant for opioid abuse, having most recently used 3 months prior to pregnancy.  She has declined opioid replacement therapy with buprenorphine    Incomplete right bundle branch block  Assessment & Plan  First noted in 2019    EKG 12/20/2023: Sinus bradycardia at 59 with sinus arrhythmia, incomplete right bundle branch block. Normal axis. No QT prolongation. T wave inversions in lead III.  Unchanged from prior EKG in April 2020.    Per Cardiology consult : \"Chest pain: Noncardiac, most likely secondary to nausea and vomiting, negative ECG for acute ischemic changes, negative enzymes . Prior history of junctional rhythm [id number] in the setting of nausea and vomiting-likely in the setting of increased vagal tone, no symptoms at baseline, no further evaluation in this regard, this was never " "associated with hemodynamic compromise\"  Echo - LVEF 55%, mild mitral valve regurg, mild tricuspid regurg      Bipolar depression (HCC)  Assessment & Plan  Diagnosed after previous pregnancy, not on meds    * Status post primary low transverse  section  Assessment & Plan       Continue routine post partum care       [id number], HgB 13.2 -> 9.1, received venofer  Pain well controlled: tylenol scheduled, coco prn  Lochia within normal limits: continue to monitor   OOB: as able, encourage ambulation  Passing flatus  Voiding spontaneously  DVT ppx: SCD, Lovenox 40mg  Baby in: nursery  Dispo: anticipate d/c home POD5      40 weeks gestation of pregnancy-resolved as of 2023  Assessment & Plan  -Prenatal labs completed  -28 week labs completed  -EFW 63%        Subjective/Objective     Subjective:   Patient feels pain well-controlled. She is frustrated and wants to go home. Voiding: spontaneously. Flatus: passing. Ambulating: yes. Chest pain: no. Shortness of breath: no. Leg pain: no. Lochia: within normal limits. Baby inroom, feeding via breast.    Objective:   Vitals:   Temp:  [98.1 °F (36.7 °C)-98.7 °F (37.1 °C)] 98.1 °F (36.7 °C)  HR:  [58-72] 60  Resp:  [18-20] 18  BP: (110-118)/(53-75) 110/53       Intake/Output Summary (Last 24 hours) at 2023 0641  Last data filed at 2023  Gross per 24 hour   Intake 2329 ml   Output 1400 ml   Net 929 ml       Lab Results   Component Value Date    WBC 11.69 (H) 2023    HGB 9.6 (L) 2023    HCT 29.8 (L) 2023    MCV 94 2023     2023       Physical Exam:   General: alert and oriented x3, in no apparent distress  Cardiovascular: regular rate  Pulmonary: normal effort  Abdomen: Soft, moderate fundal tenderness, non-distended, no rebound or guarding. Uterine fundus firm and non-tender, at the umbilicus  Incision: clean/dry/intact  Extremities: Non tender with mild edema    Kimberly Villatoro MD  Obstetrics & Gynecology, " PGY-2  12/23/2023, 6:41 AM

## 2023-12-23 NOTE — ANESTHESIA PREPROCEDURE EVALUATION
"Procedure:  CYSTOSCOPY RETROGRADE PYELOGRAM WITH INSERTION STENT URETERAL (Left: Bladder)    \"33-year-old female postpartum day 5  complicated by uterine artery ligation and postpartum hemorrhage seen in urologic consultation for left hydronephrosis and mild LETICIA. \"    Down-trending Hbg  BMI 38    Relevant Problems   CARDIO   (+) Bigeminy   (+) Cardiac arrhythmia   (+) Chest pain   (+) Incomplete right bundle branch block      GI/HEPATIC   (+) GERD (gastroesophageal reflux disease)      /RENAL   (+) LETICIA (acute kidney injury) (HCC)        Physical Exam    Airway    Mallampati score: II  TM Distance: >3 FB  Neck ROM: full     Dental       Cardiovascular  Cardiovascular exam normal    Pulmonary  Pulmonary exam normal     Other Findings  post-pubertal.      Anesthesia Plan  ASA Score- 3 Emergent    Anesthesia Type- general with ASA Monitors.         Additional Monitors:     Airway Plan: LMA.           Plan Factors-    Chart reviewed. EKG reviewed.  Existing labs reviewed. Patient summary reviewed.                  Induction- intravenous.    Postoperative Plan-     Informed Consent- Anesthetic plan and risks discussed with patient.  I personally reviewed this patient with the CRNA. Discussed and agreed on the Anesthesia Plan with the CRNA..                "

## 2023-12-23 NOTE — PLAN OF CARE
Problem: PAIN - ADULT  Goal: Verbalizes/displays adequate comfort level or baseline comfort level  Description: Interventions:  - Encourage patient to monitor pain and request assistance  - Assess pain using appropriate pain scale  - Administer analgesics based on type and severity of pain and evaluate response  - Implement non-pharmacological measures as appropriate and evaluate response  - Consider cultural and social influences on pain and pain management  - Notify physician/advanced practitioner if interventions unsuccessful or patient reports new pain  Outcome: Progressing     Problem: INFECTION - ADULT  Goal: Absence or prevention of progression during hospitalization  Description: INTERVENTIONS:  - Assess and monitor for signs and symptoms of infection  - Monitor lab/diagnostic results  - Monitor all insertion sites, i.e. indwelling lines, tubes, and drains  - Monitor endotracheal if appropriate and nasal secretions for changes in amount and color  - [place] appropriate cooling/warming therapies per order  - Administer medications as ordered  - Instruct and encourage patient and family to use good hand hygiene technique  - Identify and instruct in appropriate isolation precautions for identified infection/condition  Outcome: Progressing  Goal: Absence of fever/infection during neutropenic period  Description: INTERVENTIONS:  - Monitor WBC    Outcome: Progressing     Problem: SAFETY ADULT  Goal: Patient will remain free of falls  Description: INTERVENTIONS:  - Educate patient/family on patient safety including physical limitations  - Instruct patient to call for assistance with activity   - Consult OT/PT to assist with strengthening/mobility   - Keep Call bell within reach  - Keep bed low and locked with side rails adjusted as appropriate  - Keep care items and personal belongings within reach  - Initiate and maintain comfort rounds  - Make Fall Risk Sign visible to staff  - Apply yellow socks and bracelet  for high fall risk patients  - Consider moving patient to room near nurses station  Outcome: Progressing  Goal: Maintain or return to baseline ADL function  Description: INTERVENTIONS:  -  Assess patient's ability to carry out ADLs; assess patient's baseline for ADL function and identify physical deficits which impact ability to perform ADLs (bathing, care of mouth/teeth, toileting, grooming, dressing, etc.)  - Assess/evaluate cause of self-care deficits   - Assess range of motion  - Assess patient's mobility; develop plan if impaired  - Assess patient's need for assistive devices and provide as appropriate  - Encourage maximum independence but intervene and supervise when necessary  - Involve family in performance of ADLs  - Assess for home care needs following discharge   - Consider OT consult to assist with ADL evaluation and planning for discharge  - Provide patient education as appropriate  Outcome: Progressing  Goal: Maintains/Returns to pre admission functional level  Description: INTERVENTIONS:  - Perform AM-PAC 6 Click Basic Mobility/ Daily Activity assessment daily.  - Set and communicate daily mobility goal to care team and patient/family/caregiver.   - Collaborate with rehabilitation services on mobility goals if consulted  - Out of bed for toileting  - Record patient progress and toleration of activity level   Outcome: Progressing     Problem: DISCHARGE PLANNING  Goal: Discharge to home or other facility with appropriate resources  Description: INTERVENTIONS:  - Identify barriers to discharge w/patient and caregiver  - Arrange for needed discharge resources and transportation as appropriate  - Identify discharge learning needs (meds, wound care, etc.)  - Arrange for interpretive services to assist at discharge as needed  - Refer to Case Management Department for coordinating discharge planning if the patient needs post-hospital services based on physician/advanced practitioner order or complex needs  related to functional status, cognitive ability, or social support system  Outcome: Progressing     Problem: POSTPARTUM  Goal: Experiences normal postpartum course  Description: INTERVENTIONS:  - Monitor maternal vital signs  - Assess uterine involution and lochia  Outcome: Progressing  Goal: Appropriate maternal -  bonding  Description: INTERVENTIONS:  - Identify family support  - Assess for appropriate maternal/infant bonding   -Encourage maternal/infant bonding opportunities  - Referral to  or  as needed  Outcome: Progressing  Goal: Establishment of infant feeding pattern  Description: INTERVENTIONS:  - Assess breast/bottle feeding  - Refer to lactation as needed  Outcome: Progressing  Goal: Incision(s), wounds(s) or drain site(s) healing without S/S of infection  Description: INTERVENTIONS  - Assess and document dressing, incision, wound bed, drain sites and surrounding tissue  - Provide patient and family education  Outcome: Progressing

## 2023-12-23 NOTE — PROGRESS NOTES
"Progress Note - Kacey Kern 33 y.o. female MRN: [id number]    Unit/Bed#: -01 Encounter: [id number]      Assessment:  Kacey Kern is a 33-year-old female postpartum day 5  complicated by uterine artery ligation and postpartum hemorrhage seen in urologic consultation for left hydronephrosis and mild LETICIA.  Afebrile and hemodynamically stable.  Pain resolving.    Creatinine 1.4 last 2 draws.  No leukocytosis.  Voiding volitionally.  Clear yellow urine 1.4 L 24-hour output.  Incision--CDI    Plan:  Repeat CT demonstrated mild left hydronephrosis and hydroureter to the level of where the left ureter crosses posteriorly to enlarge left ovarian vein.  Distal ureter not well-visualized.  Uterus enlarged.    Continue symptom management and observation.  NPO.    After next nation of risk versus benefits and potential complications patient wishes to proceed with cystoscopy, retrograde pyelography and left ureteral stent placement to occur later today.  Consent obtained.  Placed in patient's folder at nurses station.      Subjective:   Denies fever, chills.  Reports less nausea.    Objective:     Vitals: Blood pressure 110/53, pulse 60, temperature 98.1 °F (36.7 °C), temperature source Oral, resp. rate 18, height 5' 5\" (1.651 m), weight 104 kg (229 lb 4.5 oz), last menstrual period 2023, SpO2 97%, not currently breastfeeding.,Body mass index is 38.15 kg/m².      Intake/Output Summary (Last 24 hours) at 2023 0822  Last data filed at 2023 2330  Gross per 24 hour   Intake 2579 ml   Output 1400 ml   Net 1179 ml       Physical Exam: General appearance: alert and oriented, in no acute distress, appears stated age, cooperative, and no distress  Head: Normocephalic, without obvious abnormality, atraumatic  Neck: no JVD and supple, symmetrical, trachea midline  Lungs: diminished breath sounds  Heart: regular rate and rhythm, S1, S2 normal, no murmur, click, rub or gallop  Abdomen: abnormal " findings:  mild tenderness in the lower abdomen  Extremities: extremities normal, warm and well-perfused; no cyanosis, clubbing, or edema  Pulses: 2+ and symmetric  Neurologic: Grossly normal     Invasive Devices       Peripheral Intravenous Line  Duration             Peripheral IV 12/20/23 Distal;Dorsal (posterior);Left Forearm 2 days                  Lab Results   Component Value Date    WBC 7.62 12/23/2023    HGB 8.6 (L) 12/23/2023    HCT 26.1 (L) 12/23/2023    MCV 91 12/23/2023     12/23/2023     Lab Results   Component Value Date    SODIUM 141 12/23/2023    K 3.8 12/23/2023     12/23/2023    CO2 28 12/23/2023    BUN 14 12/23/2023    CREATININE 1.49 (H) 12/23/2023    GLUC 85 12/23/2023    CALCIUM 8.4 12/23/2023       Lab, Imaging and other studies: I have personally reviewed pertinent reports.

## 2023-12-23 NOTE — ANESTHESIA POSTPROCEDURE EVALUATION
Post-Op Assessment Note    CV Status:  Stable  Pain Score: 0    Pain management: adequate       Mental Status:  Alert and awake   Hydration Status:  Euvolemic   PONV Controlled:  Controlled   Airway Patency:  Patent     Post Op Vitals Reviewed: Yes      Staff: CRNA               /84 (12/23/23 1605)    Temp 97.5 °F (36.4 °C) (12/23/23 1605)    Pulse 56 (12/23/23 1605)   Resp 20 (12/23/23 1605)    SpO2 100 % (12/23/23 1605)

## 2023-12-24 LAB
ATRIAL RATE: 56 BPM
ATRIAL RATE: 59 BPM
P AXIS: 28 DEGREES
P AXIS: 32 DEGREES
PR INTERVAL: 108 MS
PR INTERVAL: 114 MS
QRS AXIS: -1 DEGREES
QRS AXIS: -3 DEGREES
QRSD INTERVAL: 96 MS
QRSD INTERVAL: 96 MS
QT INTERVAL: 422 MS
QT INTERVAL: 424 MS
QTC INTERVAL: 409 MS
QTC INTERVAL: 417 MS
T WAVE AXIS: 6 DEGREES
T WAVE AXIS: 8 DEGREES
VENTRICULAR RATE: 56 BPM
VENTRICULAR RATE: 59 BPM

## 2023-12-26 NOTE — UTILIZATION REVIEW
Initial Clinical Review    Admission: Date/Time/Statement:   Admission Orders (From admission, onward)       Ordered        23 1701  Inpatient Admission  Once                          Orders Placed This Encounter   Procedures    Inpatient Admission     Standing Status:   Standing     Number of Occurrences:   1     Order Specific Question:   Level of Care     Answer:   Med Surg [16]     Order Specific Question:   Estimated length of stay     Answer:   More than 2 Midnights     Order Specific Question:   Certification     Answer:   I certify that inpatient services are medically necessary for this patient for a duration of greater than two midnights. See H&P and MD Progress Notes for additional information about the patient's course of treatment.     ED Arrival Information       Patient not seen in ED                       Chief Complaint   Patient presents with    Abdominal Pain     Contractions        Initial Presentation: 33 y.o. female  HX opioid abuse 3 mo prior to pregnancy declines Opioid replacement therapy,  at 40w3d Inpatient admission for labor    EXAM  Cervical Dilation: 4, Cervical Effacement: 70, Fetal Station: -2; VTX by TAUS  PLAN  continuous fetal monitoring; IVF  After second stage arrest and persistent category 2 tracing diagnosed,  delivery recommended   2023 @ 0121 viable baby girl apgars 8&9;  AN=4128 g (7 lb 15.2 oz)     Requires ongoing stay post partum:  POST OP day 1 N/V, sev ABD pain;  LETICIA   CT PE reveals Moderate L hydro and uteronehphrosis 2ary to enlarged uterus. No obstruction seen; req IVF, IV non opioid pain management, US inconclusive for ureteral injury.  Requires Urology consult for management w 2023 Left - CYSTOSCOPY RETROGRADE PYELOGRAM WITH INSERTION STENT URETERAL    Epidural  2023 6:26 PM   @ 6:51 PM AROM  Contraction Frequency (minutes): 3  Contraction Intensity: Moderate/Strong  Uterine Activity Characteristics: Regular  Cervical  Dilation: 4-5  Cervical Effacement: 80  Fetal Station: -1  @ 10:10 PM  Contraction Frequency (minutes): 2-3  Contraction Intensity: Moderate/Strong  Uterine Activity Characteristics: Regular  Cervical Dilation: 10  Dilation Complete Date: 23  Dilation Complete Time:   Cervical Effacement: 100  Fetal Station: 1  Baseline Rate (FHR): 145 bpm  Fetal Heart Rate (FHT): 149 BPM  FHR Category: II     Date: 2023   Day 2:   @ 12:48 AM  diagnosis of arrest of descent secondary to suspected occiput posterior. Pt continues to be zero station despite adequate pushing effort.  section recommended   2023 @ 0121 viable baby girl apgars 8&9;  TU=3776 g (7 lb 15.2 oz)      2023  DAY 3  Postpartum Day #1 s/p 1LTCS (arrest of descent) complicated by L extension and uterine artery ligation, postpartum hemorrhage, and suspected chorio   Normal post partum recovery goals met    2023  DAY 4  Postpartum Day #2 s/p 1LTCS (arrest of descent) complicated by L extension and uterine artery ligation, postpartum hemorrhage, and suspected chorio .Normal post partum recovery goals met;     acute onset nausea w vomiting w MOD pain this AM; chills & diaphoresis: given IV Dilaudid w subsequent vomiting; Prefers to avoid further IV narcs; change to IV Zofran; IV Toradol cont monitoring N/V  EKG : ECG with findings consistent known right bundle branch bloc , trops obtained, following COW  LETICIA:  Cr elevated at 1.41 up from 0.61     2023  DAY 5  Postpartum Day # 3 s/p 1LTCS (arrest of descent) complicated by L extension and uterine artery ligation, postpartum hemorrhage, and suspected chorio   LETICIA 1L LR bolus followed by 125mL/hr LR maintenance  Repeat BMP  AM 1.40, Toradol discontinued  monitor UO  Moderate L hydro and uteronehphrosis 2ary to enlarged uterus. No obstruction   Per consult Cardiology workup neg    2023  DAY 6  Postpartum Day # 4 s/p 1LTCS (arrest of descent) complicated by L  extension and uterine artery ligation, postpartum hemorrhage, and suspected chorio   Recovering from post partum goals; + L flank & back tenderness no CVA tenderness  LETICIA persistent despite IVF resuscitation w CR 1.44;   CT mild to moderate hydronephrosis w US inconclusive for ureteral injury. Consult to Urology for recommendation; DC all nephrotoxic agents   Urology  MILD to MOD L sided hydronephrosis on US, obtain CT w contrast & delayed imaging to define Ureteral obstruction & etiology  make a plan for observation versus stent placement versus PCN placement.  Please have the patient be n.p.o. after midnight given an intervention is likely.   OB MD Provider  Given LETICIA, nephrology recommends IV fluid bolus before and after CT.  1 L LR ordered for prior to imaging and 1 L LR ordered for after return to floor.     SURGERY DATE: 12/23/2023   Procedure(s):  Left - CYSTOSCOPY RETROGRADE PYELOGRAM WITH INSERTION STENT URETERAL   Anesthesia Type:   General  Operative Findings:  Bladder with cystitis changes likely from a catheter  Left retrograde pyeloureterograms showed a 5 mm area of significant tapering in the distal ureter approximately 3 cm from the ureteral orifice which was a transition point as ureter was mildly to moderately dilated proximal to this.  6 x 24 JJ stent placed with minimal resistance.     Triage Vitals   Temperature Pulse Respirations Blood Pressure SpO2   12/17/23 2000 12/17/23 1907 12/17/23 2000 12/17/23 1900 12/17/23 1900   98 °F (36.7 °C) 68 20 125/58 100 %      Temp Source Heart Rate Source Patient Position - Orthostatic VS BP Location FiO2 (%)   12/17/23 2000 12/17/23 2000 12/17/23 2000 12/17/23 2000 --   Oral Monitor Lying Left arm       Pain Score       12/17/23 2000       No Pain          Wt Readings from Last 1 Encounters:   12/22/23 104 kg (229 lb 4.5 oz)     Additional Vital Signs:   Date/Time Temp Pulse Resp BP MAP (mmHg) SpO2 O2 Flow Rate (L/min) O2 Device Cardiac (WDL) Patient  "Position - Orthostatic VS   12/23/23 1615 97.9 °F (36.6 °C) 52 Abnormal  18 133/88 -- 98 % -- None (Room air) -- --   12/23/23 1605 97.5 °F (36.4 °C) 56 20 129/80 -- 100 % 5 L/min Simple mask WDL --   12/23/23 1505 98.7 °F (37.1 °C) 56 20 145/84 -- 99 % -- None (Room air) -- --   12/23/23 1244 98.8 °F (37.1 °C) 53 Abnormal  18 111/61 -- 98 % -- None (Room air) -- Sitting   12/23/23 0935 98.4 °F (36.9 °C) 61 18 114/69 -- -- -- -- -- Sitting   12/23/23 0815 -- -- -- -- -- -- -- -- WDL --   12/23/23 0032 98.1 °F (36.7 °C) 60 18 110/53 -- 97 % -- None (Room air) -- Lying   12/22/23 2030 -- -- -- -- -- -- -- None (Room air) WDL --   12/22/23 1633 98.5 °F (36.9 °C) 63 20 118/72 81 99 % -- -- -- Sitting   12/22/23 1130 -- 58 -- 117/75 --          12/21/23 1620 98.4 °F (36.9 °C) 54 Abnormal  16 113/62 -- 96 % -- None (Room air) -- Sitting   12/21/23 0902 -- -- -- -- -- -- -- None (Room air) WDL --   12/21/23 0900 98 °F (36.7 °C) 64 18 109/53 -- 95 % -- None (Room air) -- Sitting   12/21/23 0001 99.2 °F (37.3 °C) 63 18 111/58 -- 95 % -- None (Room air) -- Lying     Weights (last 14 days) before discharge    Date/Time Weight Height   12/22/23 1130 104 kg (229 lb 4.5 oz) 5' 5\" (1.651 m)     Pertinent Labs/Diagnostic Test Results:   CT renal protocol   Final Result by Kenisha Galaviz MD (12/22 1845)      Mild left hydronephrosis and hydroureter involving the proximal 3 cm of the ureter with a transition where the left ureter crosses posterior to an enlarged left ovarian vein which is causing some level of obstruction. Distal to this point, the ureter is    not well visualized, and does not opacify with contrast.      Enlarged uterus and heterogeneous endometrium with few foci of gas in the endocervical canal and in the vagina, not unexpected in the early postoperative period.      The study was marked in EPIC for immediate notification.            Workstation performed: [id number]         US kidney and bladder   Final Result " by Halina Salas MD ([id number])   Mild to moderate left hydronephrosis as seen on the previous CT      Resident: Parth Olsen I, the attending radiologist, have reviewed the images and agree with the final report above.      Workstation performed: [id number]         CT pe study w abdomen pelvis w contrast   Final Result by Ko Suarez MD (112)         1. No evidence of acute pulmonary embolus, thoracic aortic aneurysm or dissection. No acute cardiopulmonary process.   2. Postoperative change from  section. Few foci of gas within the endometrial cavity in the lower uterine segment.   3. Moderate left hydronephrosis and hydroureter. Obstruction of the distal ureter by the enlarged uterus. Delayed left nephrogram. Pyelonephritis not excluded.                     Workstation performed: [id number]         FL retrograde pyelogram    (Results Pending)         Results from last 7 days   Lab Units 23  0847 23  1100   WBC Thousand/uL 7.62 11.69*   HEMOGLOBIN g/dL 8.6* 9.6*   HEMATOCRIT % 26.1* 29.8*   PLATELETS Thousands/uL 211 210   NEUTROS ABS Thousands/µL 5.63  --          Results from last 7 days   Lab Units 23  0847 23  0616 23  1304 23  0542 23   SODIUM mmol/L 141 140 140 140 141   POTASSIUM mmol/L 3.8 3.8 4.0 3.7 3.9   CHLORIDE mmol/L 106 107 108 107 106   CO2 mmol/L 28 27 26 26 25   ANION GAP mmol/L 7 6 6 7 10   BUN mg/dL 14 15 17 18 18   CREATININE mg/dL 1.49* 1.44* 1.37* 1.40* 1.41*   EGFR ml/min/1.73sq m 45 47 50 49 48   CALCIUM mg/dL 8.4 8.2* 7.9* 7.8* 8.3*     Results from last 7 days   Lab Units 23  0847 23  0616   AST U/L 18 34   ALT U/L 31 44   ALK PHOS U/L 121* 129*   TOTAL PROTEIN g/dL 5.7* 5.7*   ALBUMIN g/dL 3.1* 3.0*   TOTAL BILIRUBIN mg/dL 0.47 0.37         Results from last 7 days   Lab Units 23  0847 23  0616 23  1304 23  0542 23   GLUCOSE RANDOM mg/dL 85 99 76 90 90       "       No results found for: \"BETA-HYDROXYBUTYRATE\"                   Results from last 7 days   Lab Units 23  0542 23  0247 23   HS TNI 0HR ng/L  --   --  6   HS TNI 2HR ng/L 9  --   --    HSTNI D2 ng/L 3  --   --    HS TNI 4HR ng/L  --  8  --    HSTNI D4 ng/L  --  2  --              ED Treatment:   Medication Administration - No Administrations Displayed (No Start Event Found)       None          Past Medical History:   Diagnosis Date    Bacterial vaginosis     Bipolar depression (HCC)     Condyloma acuminata     Gastritis     GERD (gastroesophageal reflux disease)     H/O ETOH abuse     Heroin abuse (HCC)     History of cocaine abuse (HCC)     Narcotic abuse (HCC)     Nephrolithiasis     Opioid withdrawal (HCC)     Suicidal ideations     Trichomonal vaginitis 2019    Vulvitis      Present on Admission:   Bipolar depression (HCC)   History of opioid abuse (HCC)   Tobacco smoking affecting pregnancy in first trimester   Incomplete right bundle branch block   Condyloma acuminata      Admitting Diagnosis: 40 weeks gestation of pregnancy [Z3A.40]  Uterine contractions during pregnancy [O47.9]  Encounter for  delivery without indication [O82]  Age/Sex: 33 y.o. female  Admission Orders:  Continuous external fetal HR monitoring    Scheduled Medications:            Medications    acetaminophen (TYLENOL) tablet 650 mg  Dose: 650 mg  Freq: Every 6 hours Route: PO  Start: 23 0330 End: 23 1840     0430     1135     1719      0013     0543     1138     1719     2256      0580     1134 (9934)     1841-D/C'd         acetaminophen (TYLENOL) tablet 650 mg  Dose: 650 mg  Freq: Every 6 hours scheduled Route: PO  Start: 23 0600 End: 23 0315     0315-D/C'd           ampicillin-sulbactam (UNASYN) 3 g in sodium chloride 0.9 % 100 mL IVPB  Dose: 3 g  Freq: Once Route: IV  Last Dose: Stopped (23 0653)  Start: 23 0400 End: " 12/18/23 0653     0555     0653             ampicillin-sulbactam (UNASYN) 3 g in sodium chloride 0.9 % 100 mL IVPB  Dose: 3 g  Freq: Every 6 hours Route: IV  Start: 12/18/23 0345 End: 12/18/23 0341        0341-D/C'd           azithromycin (ZITHROMAX) 500 mg in sodium chloride 0.9% 250mL IVPB 500 mg  Dose: 500 mg  Freq: Once Route: IV  Start: 12/18/23 0100 End: 12/18/23 0054     0054-D/C'd           azithromycin (ZITHROMAX) 500 mg in sodium chloride 0.9% 250mL IVPB 500 mg  Dose: 500 mg  Freq: Once Route: IV  Start: 12/18/23 0030 End: 12/18/23 0107 0107             ceFAZolin (ANCEF) IVPB (premix in dextrose) 2,000 mg 50 mL  Dose: 2,000 mg  Freq: Once Route: IV  Start: 12/18/23 0100 End: 12/18/23 0053 0053-D/C'd           ceFAZolin (ANCEF) IVPB (premix in dextrose) 2,000 mg 50 mL  Dose: 2,000 mg  Freq: Once Route: IV  Start: 12/18/23 0030 End: 12/18/23 0103 0103             docusate sodium (COLACE) capsule 100 mg  Dose: 100 mg  Freq: 2 times daily Route: PO  Indications of Use: CONSTIPATION  Start: 12/18/23 0900 End: 12/23/23 2044     0812     1719      (0900)     (1724)      (0901)     (1812)      0902     1740      0950     2055      (0903)     (1726)     2044-D/C'd      enoxaparin (LOVENOX) subcutaneous injection 40 mg  Dose: 40 mg  Freq: Daily Route: SC  Start: 12/18/23 1400 End: 12/22/23 0853     1427      0900      0900      0901      0853-D/C'd       famotidine (PEPCID) tablet 20 mg  Dose: 20 mg  Freq: 2 times daily Route: PO  Start: 12/18/23 0900 End: 12/23/23 2044     (0833)     (1810)      (0900)     (1724)      (0901)     (1813)      0902     1740      0950     2055      (0903)     (1727)     2044-D/C'd      heparin (porcine) subcutaneous injection 5,000 Units  Dose: 5,000 Units  Freq: Every 8 hours scheduled Route: SC  Start: 12/22/23 0900 End: 12/23/23 2044         0950     1732     2200 [C]      0600 [C]     (1400)     1501     2044-D/C'd  2044         ketorolac (TORADOL) injection 30  mg  Dose: 30 mg  Freq: Every 6 hours Route: IV  Start: 12/18/23 0800 End: 12/19/23 1454     0812     1427     2021      0214     0900     1454            Followed by  ibuprofen (MOTRIN) tablet 600 mg  Dose: 600 mg  Freq: Every 6 hours Route: PO  Start: 12/19/23 2000 End: 12/20/23 0929 2043      0335 [C]     0900     0929-D/C'd         iron sucrose (VENOFER) 200 mg in sodium chloride 0.9 % 100 mL IVPB  Dose: 200 mg  Freq: Once Route: IV  Last Dose: Stopped (12/18/23 1940)  Start: 12/18/23 1800 End: 12/18/23 1940 1806 1940              ketorolac (TORADOL) injection 30 mg  Dose: 30 mg  Freq: Every 6 hours scheduled Route: IV  Start: 12/20/23 0930 End: 12/20/23 1847       0951     1430     1847-D/C'd         Followed by  ibuprofen (MOTRIN) tablet 600 mg  Dose: 600 mg  Freq: Every 6 hours PRN Route: PO  PRN Reasons: mild pain,fever,headaches,cramping  Start: 12/21/23 0930 End: 12/20/23 1847       1847-D/C'd         lactated ringers bolus 1,000 mL  Dose: 1,000 mL  Freq: Once Route: IV  Last Dose: Stopped (12/22/23 1709)  Start: 12/22/23 1345 End: 12/22/23 1709         1347     1709         lactated ringers bolus 1,000 mL  Dose: 1,000 mL  Freq: Once Route: IV  Start: 12/22/23 1345 End: 12/23/23 2044                    1501     2044     2044-D/C'd      lactated ringers bolus 1,000 mL  Dose: 1,000 mL  Freq: Once Route: IV  Last Dose: Stopped (12/21/23 0013)  Start: 12/20/23 2100 End: 12/21/23 0013 2105 0013          lidocaine-epinephrine (XYLOCAINE-MPF/EPINEPHRINE) 1.5 %-1:200,000 injection  Route: EP  Start: 12/17/23 1827 End: 12/17/23 1830 1827 1830              medroxyPROGESTERone (DEPO-PROVERA) IM injection 150 mg  Dose: 150 mg  Freq: Once Route: IM  Start: 12/20/23 1000 End: 12/23/23 1726       0900 [C]        1501     1723     1726        medroxyPROGESTERone acetate (DEPO-PROVERA SYRINGE) IM injection 150 mg  Dose: 150 mg  Freq: Every 3 months Route: IM  Start: 05/06/19 1515 End: 08/09/23  1302             metoclopramide (REGLAN) 20 mg in sodium chloride 0.9 % 50 mL IVPB  Dose: 20 mg  Freq: Once Route: IV  Last Dose: Stopped (12/20/23 1035)  Start: 12/20/23 1000 End: 12/20/23 1035       0953     1035           metoclopramide (REGLAN) injection 10 mg  Dose: 10 mg  Freq: Once Route: IV  Start: 12/17/23 1815 End: 12/17/23 1816 1816              ondansetron (ZOFRAN) injection 4 mg  Dose: 4 mg  Freq: Every 6 hours Route: IV  Start: 12/20/23 1415 End: 12/20/23 1632       1430     1632-D/C'd         ondansetron (ZOFRAN) injection 4 mg  Dose: 4 mg  Freq: Once Route: IV  Start: 12/20/23 0930 End: 12/20/23 0951       0951           oxytocin (PITOCIN) 30 UNIT/500ML 30 Units in lactated ringers 500 mL infusion **ADS Override Pull**  Last Dose: Stopped (12/18/23 0653)  Start: 12/17/23 1721 End: 12/18/23 0653     0128 [C]     0653             oxytocin (PITOCIN) 30 Units in lactated ringers 500 mL infusion  Dose: 62.5 corry-units/min  Freq: Once Route: IV  Last Dose: Stopped (12/18/23 0653)  Start: 12/18/23 0315 End: 12/18/23 0653     0336     0653                 Continuous IV Infusions:  lactated ringers infusion  Rate: 125 mL/hr Dose: 125 mL/hr  Freq: Continuous Route: IV  Indications of Use: IV Hydration  Last Dose: Stopped (12/23/23 1734)  Start: 12/18/23 0315 End: 12/23/23 2044     lactated ringers infusion  Rate: 125 mL/hr Dose: 125 mL/hr  Freq: Continuous Route: IV  Last Dose: Stopped (12/18/23 0245)  Start: 12/17/23 1715 End: 12/18/23 0313     ropivacaine 0.2% PCEA  Continuous Rate: 10 mL/hr  PCEA Dose: 5 mL  PCEA Lock-out: 10 Minutes  Number of Doses per Hour: 3 doses/hr  Freq: Continuous Route: EP  Indications of Use: LABOR PAIN  Last Dose: Stopped (12/18/23 0653)     PRN Meds:  IV = HYDROmorphone (DILAUDID) injection 0.5 mg  Dose: 0.5 mg  Freq: Every 2 hour PRN Route: IV  PRN Reason: breakthrough pain  Start: 12/18/23 0313 End: 12/20/23 0929 12/19 x1, 12/20 x1    IV =  ketorolac (TORADOL) injection 30  mg12/20 x2  Dose: 30 mg  Freq: Every 6 hours scheduled Route: IV  Start: 12/20/23 0930 End: 12/20/23 1847     IV = ondansetron (ZOFRAN) injection 4 mg 12/17 x2  Dose: 4 mg  Freq: Every 4 hours PRN Route: IV  PRN Reasons: nausea,vomiting    PO = ondansetron (ZOFRAN-ODT) dispersible tablet 4 mg  Dose: 4 mg  Freq: Every 6 hours PRN Route: PO  PRN Reasons: nausea,vomiting  Start: 12/20/23 1848 End: 12/23/23 2044   Start: 12/17/23 1700 End: 12/18/23 0313   IP CONSULT TO CASE MANAGEMENT  IP CONSULT TO CARDIOLOGY  IP CONSULT TO UROLOGY    Network Utilization Review Department  ATTENTION: Please call with any questions or concerns to [phone] and carefully listen to the prompts so that you are directed to the right person. All voicemails are confidential.   For Discharge needs, contact Care Management DC Support Team at [phone] opt. 2  Send all requests for admission clinical reviews, approved or denied determinations and any other requests to dedicated fax number below belonging to the campus where the patient is receiving treatment. List of dedicated fax numbers for the Facilities:  FACILITY NAME UR FAX NUMBER   ADMISSION DENIALS (Administrative/Medical Necessity) [phone]   DISCHARGE SUPPORT TEAM (NETWORK) [phone]   PARENT CHILD HEALTH (Maternity/NICU/Pediatrics) [phone]   [facility] [phone]   [facility] [phone]   [facility] [phone]   [facility] [phone]   [facility] [phone]   [facility] [phone]   [facility] [phone]   [facility] [phone]   [facility] [phone]   [facility] [phone]   [facility] [phone]

## 2023-12-26 NOTE — UTILIZATION REVIEW
"NOTIFICATION OF INPATIENT ADMISSION   MATERNITY/DELIVERY AUTHORIZATION REQUEST   SERVICING FACILITY:   [facility]  [facility] Child [facility] - L&D, [place], [place] [address]  Tax ID: [id number]  NPI: [id number]   ATTENDING PROVIDER:  Attending Name and NPI#: Sharifa Lopez Do [[id number]]  Address: [address] [place] [address]  Phone: [phone]   ADMISSION INFORMATION:  Place of Service: Inpatient [facility]  Place of Service Code: 21  Inpatient Admission Date/Time: 23  5:01 PM  Discharge Date/Time: 2023  6:44 PM  Admitting Diagnosis Code/Description:  40 weeks gestation of pregnancy [Z3A.40]  Uterine contractions during pregnancy [O47.9]  Encounter for  delivery without indication [O82]     Mother: Kacey Kern 1990 Estimated Date of Delivery: 23  Delivering clinician: Sharifa Lopez    OB History          2    Para   2    Term   2       0    AB   0    Living   2         SAB   0    IAB   0    Ectopic   0    Multiple   0    Live Births   2                Name & MRN:   Information for the patient's :  Noriega Sorayadomitila Rosales [[id number]]    Delivery Information:  Sex: female  Delivered 2023 1:21 AM by , Low Transverse; Gestational Age: 40w4d    [place] Measurements:  Weight: 7 lb 15.2 oz (3605 g);  Height: 20\"    APGAR 1 minute 5 minutes 10 minutes   Totals: 8 9      [place] Birth Information: 33 y.o. female MRN: [id number] Unit/Bed#: -01   Birthweight: No birth weight on file. Gestational Age: <None> Delivery Type:    APGARS Totals:        UTILIZATION REVIEW CONTACT:  Jonna Chatterjee, Utilization   Network Utilization Review Department  Phone: [phone]  Fax [phone]  Email: [email or web address]  Contact for approvals/pending authorizations, clinical reviews, and discharge.     PHYSICIAN ADVISORY SERVICES:  Medical Necessity Denial & " Rdqe-dc-Loks Review  Phone: [phone]  Fax: [phone]  Email: [email or web address]     DISCHARGE SUPPORT TEAM:  For Patients Discharge Needs & Updates  Phone: [phone] opt. 2 Fax: [phone]  Email: [email or web address]      Kimberly Villatoro MD  Resident  OB/GYN     Discharge Summary      Cosign Needed     Date of Service: 2023  6:44 PM     Cosign Needed         Discharge Summary - Kacey Kern 33 y.o. female MRN: [id number]     Unit/Bed#: -01 Encounter: [id number]     ADMISSION  Admission Date: 2023   Admitting Attending: Dr. Ana cabrera. providers found  Admitting Diagnoses:   Pregnancy at 40w3d  Tobacco smoking in pregnancy  History opioid abuse  History of incomplete right bundle branch block  History of bipolar depression     DELIVERY  Delivery Method: , Low Transverse    Delivery Date and Time: 2023  1:21 AM   Delivery Attending: Sharifa Lopez      DISCHARGE  Discharge Date: 23  Discharge Attending: Dr. Nunez  Discharge Diagnosis:   S/p primary low transverse  section  Postpartum hemorrhage  Tobacco smoking in pregnancy  History opioid abuse  History of incomplete right bundle branch block  History of bipolar depression  LETICIA     Clinical course: Admission to Delivery  Kacey Kern is a 33 y.o.  who was admitted at 40w4d for labor. Initial SVE 4/70/-2. Patient received an epidural for analgesia. Amniotomy was completed with clear fluid appreciated. Prolonged 5 minute fetal heart rate deceleration appreciated s/p epidural and amniotomy with resolution to baseline with maternal repositioning and IV fluid bolus. Patient made steady cervical change to complete and began pushing. After pushing >2 hours with fetal position noted to be occiput posterior, safety huddle was completed with patient and obstetric team with decision to move forward with  section for arrest of descent.        Delivery  Route of  Delivery: , Low Transverse   Reason for  delivery: Arrest of Descent;Category II FHR Tracing;Cephalopelvic Disproportion     Anesthesia: Epidural ,   QBL: 1666cc  Delivery: , Low Transverse  at 2023  1:21 AM   Baby's Weight: 3605 g (7 lb 15.2 oz) ; 127.16     Apgar scores: 8  and 9  at 1 and 5 minutes, respectively     Delivery was completed with left hysterotomy extension into the cervical tissue. Postpartum hemorrhage was diagnosed with QBL 1666cc. Left uterine artery ligation was performed during procedure for hemostatic control followed by 1g IV TXA. SurgiFoam was placed within the left hysterotomy repair site for continued hemostasis. See operative report for details.        Clinical Course: Post-Delivery:  The post delivery course was remarkable for nausea, vomiting, and LETICIA.  On POD#2, patient reports intractable nausea and vomiting as well as anxiety attack. An EKG was collected which showed sinus rhythm with RBBB. Cardiology was consulted and performed an echo which showed LVEF 55% and mild mitral and tricuspid regurgitation. A CMP demonstrated creatinine rise from 0.61 to 1.44. Imaging showed left hydronephrosis secondary to obstruction of distal ureter. Urology then ordered CT imaging which demonstrated mild hydroureter down to the enlarged uterus. After discussion with the patient, she elected for cystoscopy with retrograde pyelogram and stent placement.      On POD#5, patient had left ureteral stent placement with urology follow-up outpatient.      On the day of discharge, the patient was ambulating, tolerating oral intake, and hemodynamically stable. She was able to reasonably perform all ADLs. She had appropriate bowel function. Pain was well-controlled. She was discharged home on postpartum/postop day #5. Patient was instructed to follow up with her OB as an outpatient and was given appropriate warnings to call her provider with problems or concerns.     Pertinent lab  "findings included:   Blood type AB positive.      Last three Hgb values:        Lab Results   Component Value Date     HGB 8.6 (L) 12/23/2023     HGB 9.6 (L) 12/22/2023     HGB 9.1 (L) 12/18/2023         Problem-specific follow-up plans included the following:  Problem List         Bipolar depression (HCC)     Current Assessment & Plan       Diagnosed after previous pregnancy, not on meds           Nicotine dependence     GERD (gastroesophageal reflux disease)     Low TSH level     Cardiac arrhythmia     Bigeminy     Accelerated atrioventricular junctional rhythm     Incomplete right bundle branch block     Current Assessment & Plan       First noted in 2019     EKG 12/20/2023: Sinus bradycardia at 59 with sinus arrhythmia, incomplete right bundle branch block. Normal axis. No QT prolongation. T wave inversions in lead III.  Unchanged from prior EKG in April 2020.     Per Cardiology consult : \"Chest pain: Noncardiac, most likely secondary to nausea and vomiting, negative ECG for acute ischemic changes, negative enzymes . Prior history of junctional rhythm [id number] in the setting of nausea and vomiting-likely in the setting of increased vagal tone, no symptoms at baseline, no further evaluation in this regard, this was never associated with hemodynamic compromise\"  Echo 12/22- LVEF 55%, mild mitral valve regurg, mild tricuspid regurg              Felon of finger     History of opioid abuse (HCC)     Overview       She has a past medical history significant for opioid abuse, having most recently used 3 months prior to pregnancy.  She has declined opioid replacement therapy with buprenorphine.  She currently smokes about 3 cigarettes/day but denied vaping, marijuana, or other  illicit drug use at her initial [facility] visit.            Current Assessment & Plan       She has a past medical history significant for opioid abuse, having most recently used 3 months prior to pregnancy.  She has declined opioid replacement " therapy with buprenorphine           Tobacco smoking affecting pregnancy in first trimester     Current Assessment & Plan       She currently smokes about 3 cigarettes/day but denied vaping, marijuana, or other illicit drug use at her initial [facility] visit, given persistent nausea and vomiting consider UDS to rule out Hyperemesis secondary to cannabis           History of surgical biopsy     Current Assessment & Plan       History of open appendectomy and supraumbilical hernia repair w/ mesh           * (Principal) Status post primary low transverse  section     Current Assessment & Plan            Continue routine post partum care       [id number], HgB 13.2 -> 9.1, received venofer  Pain well controlled: tylenol scheduled, coco prn  Lochia within normal limits: continue to monitor   OOB: as able, encourage ambulation  Passing flatus  Voiding spontaneously  DVT ppx: SCD, Lovenox 40mg  Baby in: nursery  Dispo: anticipate d/c home POD5              Postpartum hemorrhage     Condyloma acuminata     Rubella non-immune status, delivered, current hospitalization     Current Assessment & Plan       MMR ordered           LETICIA (acute kidney injury) (HCC)     Current Assessment & Plan       : Cr elevated at 1.41 up from 0.61  CT A/P - Moderate L hydrouteronephrosis 2ary to uterus enlargement. UA unremarkable.   CT renal: Mild left hydronephrosis and hydroureter involving the proximal 3 cm of the ureter with a transition where the left ureter crosses posterior to an enlarged left ovarian vein which is causing some level of obstruction. Distal to this point, the ureter is not well visualized, and does not opacify with contrast.   Enlarged uterus and heterogeneous endometrium with few foci of gas in the endocervical canal and in the vagina, not unexpected in the early postoperative period.     Creatine 1.41-> 1.40-> 1.37-> 1.44 > 1.49     Plan:  -IV reestablished  -1L LR bolus followed by 125mL/hr LR  -Toradol  discontinued  -monitor urine output  - S/p L ureteral stent placement by Urology with plan for outpatient stent removal; f/u BMP              Chest pain     Current Assessment & Plan       Patient with intermittent nonradiating chest pain and shortness of breath since the a.m. of 12/20/2023.  Now resolved. Pt w hx of Junctional rhythm with ventricular bigeminy likely in the setting of high vagal tone     EKG 12/20/2023: Sinus bradycardia at 59 with sinus arrhythmia, incomplete right bundle branch block.  Normal Axis. No QT prolongation. T wave inversions in lead III.  No acute ischemic changes. Unchanged from prior EKG in April 2020. EKG not concerning per cardiology  Delta Trop 2,  Unlikely ACS/ HS 3 (low risk), BELLA Risk index low.   BNP <50  CT PE study negative for PE, or other cardiopulmonary process.        Plan:  F/u Cardio consult.                  Nausea and vomiting     Current Assessment & Plan       Pt with persistent N/V s/p dilaudid administration  Hx of opioid abuse, per pt clean for 1 year.   Pt reports sxs similar to prior opioid withdrawal, resistant to buprenorphine/naloxone tx.    Initially Improved after Zofran IV, Tigan PRN-> 12/22 symptoms reoccured     Cause unclear, possible dilaudid side effect vs post op N/V. Cardiac and PE w/u neg . Possible acute withdrawal. Low suspicion for acute pyelonephritis (no CVA ttp, afebrile)      Continue antiemetics, IVF                     Other Visit Diagnoses         Lactating mother    -  Primary     Arrest of descent, delivered, current hospitalization         Hydronephrosis of left kidney                    Discharge med list:  Contraception: Depo=Provera     Medication List      START taking these medications     acetaminophen 325 mg tablet; Commonly known as: TYLENOL; Take 2 tablets   (650 mg total) by mouth every 6 (six) hours as needed for mild pain     CONTINUE taking these medications     PRENATAL VITAMIN PO        Condition at discharge:   good       Disposition:   Home     Planned Readmission:   No                    Revision History

## 2024-01-02 NOTE — PROGRESS NOTES
Post-Partum Checkup Visit    Kacey Kern is a 33 y.o.  female     Assessment:  Delivered a female  (7lb 15.2oz) via 1LTCS with on 23 due to arrest of decent (OP position) after admission for labor, doing well today. Her surgical course was complicated by PPH w/ L hysterotomy extension and NEVAEH ligation. Her postop course was complicated by LETICIA requiring L stent placement.     Plan:    Problem List Items Addressed This Visit          Unprioritized    Status post primary low transverse  section     Breastfeeding: No  Contraception: Received Depo in hospital  PPH: QBL 1.6L; Hgb 8.6; s/p venofer inpatient; Asymptomatic   Activity: Restricted until 6 weeks postpartum  LETICIA: Cr on discharge was 1.49. s/p cysto with retrograde pyelogram and L stent placement on 23; Planning urology follow on 24 (plan for either ureterosocopy vs. Stent pull); BMP ordered to assess creatinine  RTO 3 months for annual exam and PRN         LETICIA (acute kidney injury) (HCC) - Primary    Relevant Orders    Ambulatory Referral to Urology    Basic metabolic panel (Completed)       Subjective/Objective     Subjective:     Kacey Kern is a 34yo  s/p 1LTCS on 23 due to arrest of descent after admission for labor. Her surgical course was complicated by PPH with left hysterotomy extension in the cervical tissue requiring left uterine artery ligation and surgifoam placement. Her postpartum course was complicated by nausea, vomiting and an LETICIA. Cardiology consultation was obtained due to RBBB on EKG and Echo showed LVEF 55% and mild mitral and triscuspid regurgitation. She was found to have left hydronephrosis secondary to obstruction of the distal ureter. She elected for cystoscopy with retrograde pyelogram and stent placmeent and was discharged home on POD #5.     Pain: no  Tolerating Oral Intake: yes  Voiding: yes  Flatus: yes  Bowel Movement: yes  Ambulating: yes  Breastfeeding: Bottle  feeding  Chest Pain: no  Shortness of Breath: no  Leg Pain/Discomfort: no  Lochia: minimal     Objective:   Vitals:  Vitals:    24 1254   BP: 120/78       Physical Exam:  Physical Exam  Vitals reviewed. Exam conducted with a chaperone present.   Constitutional:       General: She is not in acute distress.     Appearance: She is not ill-appearing, toxic-appearing or diaphoretic.   Cardiovascular:      Rate and Rhythm: Normal rate.   Pulmonary:      Effort: Pulmonary effort is normal. No respiratory distress.   Abdominal:      General: There is no distension.      Palpations: Abdomen is soft. There is no mass.      Tenderness: There is no abdominal tenderness. There is no guarding or rebound.      Comments: Incision well healed   Skin:     General: Skin is warm and dry.   Neurological:      Mental Status: She is alert and oriented to person, place, and time. Mental status is at baseline.   Psychiatric:         Mood and Affect: Mood normal.         Behavior: Behavior normal.         Thought Content: Thought content normal.         Judgment: Judgment normal.           OB Hx:  OB History    Para Term  AB Living   2 2 2 0 0 2   SAB IAB Ectopic Multiple Live Births   0 0 0 0 2      # Outcome Date GA Lbr Uday/2nd Weight Sex Delivery Anes PTL Lv   2 Term 23 40w4d / 05:21 3605 g (7 lb 15.2 oz) F CS-LTranv EPI N HUEY      Complications: Fetal Intolerance, Failure to Progress in Second Stage, Cephalopelvic Disproportion      Name: Soraya Noriega      Apgar1: 8  Apgar5: 9   1 Term 13 40w0d  3175 g (7 lb) M Vag-Spont  N HUEY     Medical Hx  Past Medical History:   Diagnosis Date    Bacterial vaginosis     Bipolar depression (HCC)     Condyloma acuminata     Gastritis     GERD (gastroesophageal reflux disease)     H/O ETOH abuse     Heroin abuse (HCC)     History of cocaine abuse (HCC)     Narcotic abuse (HCC)     Nephrolithiasis     Opioid withdrawal (HCC)     Suicidal ideations     Trichomonal  vaginitis 2019    Vulvitis      Surgical Hx  Past Surgical History:   Procedure Laterality Date    APPENDECTOMY      FL RETROGRADE PYELOGRAM  2023    HERNIA REPAIR      KNEE SURGERY      [place]  DELIVERY ONLY N/A 2023    Procedure:  SECTION ();  Surgeon: Sharifa Lopez DO;  Location: [facility] ;  Service: Obstetrics    [place] CYSTO BLADDER W/URETERAL CATHETERIZATION Left 2023    Procedure: CYSTOSCOPY RETROGRADE PYELOGRAM WITH INSERTION STENT URETERAL;  Surgeon: Tuan Salas MD;  Location: [facility];  Service: Urology    URETERAL STENT PLACEMENT       Family Hx  Family History   Problem Relation Age of Onset    No Known Problems Mother     No Known Problems Father     Diabetes Maternal Grandmother     Cancer Paternal Grandmother      Social Hx  Social History     Socioeconomic History    Marital status: Single     Spouse name: Not on file    Number of children: Not on file    Years of education: Not on file    Highest education level: Not on file   Occupational History    Not on file   Tobacco Use    Smoking status: Some Days     Types: Cigarettes    Smokeless tobacco: Never   Vaping Use    Vaping status: Never Used   Substance and Sexual Activity    Alcohol use: Not Currently     Comment: rarely;  Most recently a couple of shots and few beers    Drug use: Not Currently     Types: Heroin, Marijuana, Fentanyl, Methamphetamines     Comment: opuate use - last 5 months ago.. One year ago meth and Fentanyl    Sexual activity: Yes     Partners: Male     Birth control/protection: None   Other Topics Concern    Not on file   Social History Narrative    Not on file     Social Determinants of Health     Financial Resource Strain: Not on file   Food Insecurity: Not on file   Transportation Needs: Not on file   Physical Activity: Not on file   Stress: Not on file   Social Connections: Not on file   Intimate Partner Violence: Not on file   Housing Stability: Not on file     Allergies  No Known  Allergies    Chey Flores MD  OB/GYN  1/14/2024  6:20 PM

## 2024-01-04 ENCOUNTER — TELEPHONE (OUTPATIENT)
Age: 34
End: 2024-01-04

## 2024-01-04 ENCOUNTER — OFFICE VISIT (OUTPATIENT)
Dept: OBGYN CLINIC | Facility: CLINIC | Age: 34
End: 2024-01-04
Payer: COMMERCIAL

## 2024-01-04 VITALS
DIASTOLIC BLOOD PRESSURE: 78 MMHG | SYSTOLIC BLOOD PRESSURE: 120 MMHG | BODY MASS INDEX: 34.16 KG/M2 | WEIGHT: 205 LBS | HEIGHT: 65 IN

## 2024-01-04 DIAGNOSIS — Z98.891 STATUS POST PRIMARY LOW TRANSVERSE CESAREAN SECTION: ICD-10-CM

## 2024-01-04 DIAGNOSIS — N17.9 AKI (ACUTE KIDNEY INJURY) (HCC): Primary | ICD-10-CM

## 2024-01-04 NOTE — TELEPHONE ENCOUNTER
Patient being referred by her OBGYN. Patient underwent a cysto retrograde pyelogram with insertion stent ureteral with Dr Salas on 12/23/23    As per Dr. Salas's note:  Patient will be arranged for outpatient Urology follow-up in which she will discuss ureteroscopy in 4 weeks versus stent pull 4 weeks with follow-up imaging     Please advise I could not find any upcoming appts within the 4 week time frame.    Patient can be reached at [phone]

## 2024-01-05 ENCOUNTER — APPOINTMENT (OUTPATIENT)
Dept: LAB | Facility: CLINIC | Age: 34
End: 2024-01-05
Payer: COMMERCIAL

## 2024-01-05 DIAGNOSIS — N17.9 AKI (ACUTE KIDNEY INJURY) (HCC): ICD-10-CM

## 2024-01-05 LAB
ANION GAP SERPL CALCULATED.3IONS-SCNC: 7 MMOL/L
BUN SERPL-MCNC: 12 MG/DL (ref 5–25)
CALCIUM SERPL-MCNC: 9.2 MG/DL (ref 8.4–10.2)
CHLORIDE SERPL-SCNC: 106 MMOL/L (ref 96–108)
CO2 SERPL-SCNC: 27 MMOL/L (ref 21–32)
CREAT SERPL-MCNC: 0.89 MG/DL (ref 0.6–1.3)
GFR SERPL CREATININE-BSD FRML MDRD: 84 ML/MIN/1.73SQ M
GLUCOSE SERPL-MCNC: 85 MG/DL (ref 65–140)
POTASSIUM SERPL-SCNC: 4.5 MMOL/L (ref 3.5–5.3)
SODIUM SERPL-SCNC: 140 MMOL/L (ref 135–147)

## 2024-01-05 PROCEDURE — 80048 BASIC METABOLIC PNL TOTAL CA: CPT

## 2024-01-05 PROCEDURE — 36415 COLL VENOUS BLD VENIPUNCTURE: CPT

## 2024-01-14 NOTE — ASSESSMENT & PLAN NOTE
Breastfeeding: No  Contraception: Received Depo in hospital  PPH: QBL 1.6L; Hgb 8.6; s/p venofer inpatient; Asymptomatic   Activity: Restricted until 6 weeks postpartum  LETICIA: Cr on discharge was 1.49. s/p cysto with retrograde pyelogram and L stent placement on 12/23/23; Planning urology follow on 2/2/24 (plan for either ureterosocopy vs. Stent pull); BMP ordered to assess creatinine  RTO 3 months for annual exam and PRN

## 2024-01-15 ENCOUNTER — TELEPHONE (OUTPATIENT)
Dept: UROLOGY | Facility: AMBULATORY SURGERY CENTER | Age: 34
End: 2024-01-15

## 2024-01-15 NOTE — TELEPHONE ENCOUNTER
Doing reschedules for Dr Salas who has 2/2 blocked now for a surgery. Pt appt moved to 2/1 at same time. Called and left voice mail to call back to confirm. Thank you.

## 2024-01-17 NOTE — TELEPHONE ENCOUNTER
2nd attempt: called pt and lvm with rescheduled date time and location for 2/1. Asked to call back and confirm.

## 2024-02-01 ENCOUNTER — OFFICE VISIT (OUTPATIENT)
Dept: UROLOGY | Facility: AMBULATORY SURGERY CENTER | Age: 34
End: 2024-02-01
Payer: COMMERCIAL

## 2024-02-01 VITALS
BODY MASS INDEX: 34.16 KG/M2 | SYSTOLIC BLOOD PRESSURE: 122 MMHG | WEIGHT: 205 LBS | OXYGEN SATURATION: 98 % | DIASTOLIC BLOOD PRESSURE: 80 MMHG | HEIGHT: 65 IN | RESPIRATION RATE: 18 BRPM | HEART RATE: 85 BPM

## 2024-02-01 DIAGNOSIS — N13.5 URETERAL OBSTRUCTION, LEFT: Primary | ICD-10-CM

## 2024-02-01 DIAGNOSIS — N17.9 AKI (ACUTE KIDNEY INJURY) (HCC): ICD-10-CM

## 2024-02-01 PROCEDURE — 99214 OFFICE O/P EST MOD 30 MIN: CPT | Performed by: UROLOGY

## 2024-02-01 RX ORDER — CEFAZOLIN SODIUM 2 G/50ML
2000 SOLUTION INTRAVENOUS ONCE
OUTPATIENT
Start: 2024-02-01 | End: 2024-02-01

## 2024-02-01 NOTE — PROGRESS NOTES
Assessment/Plan:    Ureteral obstruction, left  The patient had imaging findings consistent with left-sided ureteral obstruction associated LETICIA after her  during which there was left uterine artery ligation.  You have obstruction was not clear and the patient had a stent placed without resistance.  We discussed options today which focused on removal of stent and trial of life with repeat imaging in the short interval versus going to the operating room for ureteroscopy to evaluate the distal ureter to determine if there is visual evidence of obstruction still present.  If there was this may promote us to consider balloon dilation or ureteral reimplant surgery rather than stent removal.    The patient would like to move forward with ureteroscopy.  I think this is very reasonable.  Risks and benefits were discussed.  Consent was signed.          Subjective:      Patient ID: Kacey Kern is a 33 y.o. female.    HPI    Patient underwent  significant for left uterine artery ligation and subsequently developed LETICIA and imaging concerning for left side hydronephrosis down to an enlarged uterus.  The patient denied left-sided pain but given the change in her kidney function and imaging findings there was concern for potential obstruction and patient elected for retrograde pyelogram which showed a 5 mm area of significant tapering in the distal ureter approximately 3 cm from the ureteral orifice which was a transition point as ureter was mildly to moderately dilated proximal to this and therefore a 6 x 24 JJ stent placed with minimal resistance.    Patient's creatinine was 0.9 2024 down from 1.4 during hospitalization.    She overall feels well now.  Denies any significant issues with her stent other than urinary urgency    Past Surgical History:   Procedure Laterality Date    APPENDECTOMY      FL RETROGRADE PYELOGRAM  2023    HERNIA REPAIR      KNEE SURGERY      [place]  DELIVERY ONLY  "N/A 2023    Procedure:  SECTION ();  Surgeon: Sharifa Lopez DO;  Location: [facility];  Service: Obstetrics    [place] CYSTO BLADDER W/URETERAL CATHETERIZATION Left 2023    Procedure: CYSTOSCOPY RETROGRADE PYELOGRAM WITH INSERTION STENT URETERAL;  Surgeon: Tuan Salas MD;  Location: [facility];  Service: Urology    URETERAL STENT PLACEMENT          Past Medical History:   Diagnosis Date    Bacterial vaginosis     Bipolar depression (HCC)     Condyloma acuminata     Gastritis     GERD (gastroesophageal reflux disease)     H/O ETOH abuse     Heroin abuse (HCC)     History of cocaine abuse (HCC)     Narcotic abuse ([facility])     Nephrolithiasis     Opioid withdrawal ([facility])     Suicidal ideations     Trichomonal vaginitis 2019    Vulvitis              Review of Systems   Constitutional:  Negative for chills and fever.   HENT:  Negative for ear pain and sore throat.    Eyes:  Negative for pain and visual disturbance.   Respiratory:  Negative for cough and shortness of breath.    Cardiovascular:  Negative for chest pain and palpitations.   Gastrointestinal:  Negative for abdominal pain and vomiting.   Genitourinary:  Negative for dysuria and hematuria.   Musculoskeletal:  Negative for arthralgias and back pain.   Skin:  Negative for color change and rash.   Neurological:  Negative for seizures and syncope.   All other systems reviewed and are negative.        Objective:      /80 (BP Location: Left arm, Patient Position: Sitting, Cuff Size: Standard)   Pulse 85   Resp 18   Ht 5' 5\" (1.651 m)   Wt 93 kg (205 lb)   LMP 2023   SpO2 98%   BMI 34.11 kg/m²     No results found for: \"PSA\"       Physical Exam  Vitals reviewed.   Constitutional:       General: She is not in acute distress.     Appearance: Normal appearance. She is not ill-appearing, toxic-appearing or diaphoretic.   HENT:      Head: Normocephalic and atraumatic.   Eyes:      Extraocular Movements: Extraocular movements " intact.      Pupils: Pupils are equal, round, and reactive to light.   Pulmonary:      Effort: Pulmonary effort is normal.   Abdominal:      General: Abdomen is flat. There is no distension.      Palpations: Abdomen is soft. There is no mass.      Tenderness: There is no abdominal tenderness. There is no guarding or rebound.      Hernia: No hernia is present.   Skin:     General: Skin is warm.   Neurological:      General: No focal deficit present.      Mental Status: She is alert and oriented to person, place, and time. Mental status is at baseline.   Psychiatric:         Mood and Affect: Mood normal.         Behavior: Behavior normal.         Thought Content: Thought content normal.           Orders  No orders of the defined types were placed in this encounter.

## 2024-02-01 NOTE — ASSESSMENT & PLAN NOTE
The patient had imaging findings consistent with left-sided ureteral obstruction associated LETICIA after her  during which there was left uterine artery ligation.  You have obstruction was not clear and the patient had a stent placed without resistance.  We discussed options today which focused on removal of stent and trial of life with repeat imaging in the short interval versus going to the operating room for ureteroscopy to evaluate the distal ureter to determine if there is visual evidence of obstruction still present.  If there was this may promote us to consider balloon dilation or ureteral reimplant surgery rather than stent removal.    The patient would like to move forward with ureteroscopy.  I think this is very reasonable.  Risks and benefits were discussed.  Consent was signed.

## 2024-02-02 ENCOUNTER — TELEPHONE (OUTPATIENT)
Dept: UROLOGY | Facility: AMBULATORY SURGERY CENTER | Age: 34
End: 2024-02-02

## 2024-02-02 ENCOUNTER — PREP FOR PROCEDURE (OUTPATIENT)
Dept: UROLOGY | Facility: AMBULATORY SURGERY CENTER | Age: 34
End: 2024-02-02

## 2024-02-02 DIAGNOSIS — R39.89 SUSPECTED UTI: Primary | ICD-10-CM

## 2024-02-02 DIAGNOSIS — N17.9 AKI (ACUTE KIDNEY INJURY) (HCC): ICD-10-CM

## 2024-02-02 NOTE — TELEPHONE ENCOUNTER
Called patient to schedule L ureteroscopy/ L RGP/ L stent with Dr. Salas. Patient was unavailable to answer call. I did leave a voicemail asking patient to please give the office a call to schedule. Office number was provided.

## 2024-02-05 NOTE — TELEPHONE ENCOUNTER
Called patient to schedule surgery with Dr. Salas. I offered patient date on 2/28/24 at [facility]. Patient was unavailable for that date. I then offered next available date of 3/5/24 at [facility] with Dr. Salas. Patient was satisfied with offered date and location. I did let patient know that pre-op urine culture has been ordered and needs to be completed on 2/20/24. All pre-op instructions have been reviewed with patient and surgery packet has been mailed to patients address on file. I did ask that if patient has any future questions or concerns to please give office a call. Patient verbalized understanding.

## 2024-02-20 ENCOUNTER — APPOINTMENT (OUTPATIENT)
Dept: LAB | Facility: CLINIC | Age: 34
End: 2024-02-20
Payer: COMMERCIAL

## 2024-02-20 DIAGNOSIS — R39.89 SUSPECTED UTI: ICD-10-CM

## 2024-02-20 DIAGNOSIS — N17.9 AKI (ACUTE KIDNEY INJURY) (HCC): ICD-10-CM

## 2024-02-20 PROCEDURE — 87086 URINE CULTURE/COLONY COUNT: CPT

## 2024-02-21 LAB — BACTERIA UR CULT: NORMAL

## 2024-03-05 ENCOUNTER — TELEPHONE (OUTPATIENT)
Dept: UROLOGY | Facility: AMBULATORY SURGERY CENTER | Age: 34
End: 2024-03-05

## 2024-03-05 ENCOUNTER — APPOINTMENT (OUTPATIENT)
Dept: RADIOLOGY | Facility: HOSPITAL | Age: 34
End: 2024-03-05
Payer: COMMERCIAL

## 2024-03-05 ENCOUNTER — HOSPITAL ENCOUNTER (OUTPATIENT)
Facility: HOSPITAL | Age: 34
Setting detail: OUTPATIENT SURGERY
Discharge: HOME | End: 2024-03-05
Attending: UROLOGY | Admitting: UROLOGY
Payer: COMMERCIAL

## 2024-03-05 ENCOUNTER — ANESTHESIA (OUTPATIENT)
Dept: PERIOP | Facility: HOSPITAL | Age: 34
End: 2024-03-05
Payer: COMMERCIAL

## 2024-03-05 ENCOUNTER — ANESTHESIA EVENT (OUTPATIENT)
Dept: PERIOP | Facility: HOSPITAL | Age: 34
End: 2024-03-05
Payer: COMMERCIAL

## 2024-03-05 VITALS
TEMPERATURE: 97.3 F | OXYGEN SATURATION: 97 % | SYSTOLIC BLOOD PRESSURE: 109 MMHG | DIASTOLIC BLOOD PRESSURE: 59 MMHG | RESPIRATION RATE: 18 BRPM | HEART RATE: 65 BPM

## 2024-03-05 DIAGNOSIS — N13.5 URETERAL OBSTRUCTION, LEFT: Primary | ICD-10-CM

## 2024-03-05 LAB
EXT PREGNANCY TEST URINE: NEGATIVE
EXT. CONTROL: NORMAL

## 2024-03-05 PROCEDURE — 74420 UROGRAPHY RTRGR +-KUB: CPT

## 2024-03-05 PROCEDURE — 52351 CYSTOURETERO & OR PYELOSCOPE: CPT | Performed by: UROLOGY

## 2024-03-05 PROCEDURE — C2617 STENT, NON-COR, TEM W/O DEL: HCPCS | Performed by: UROLOGY

## 2024-03-05 PROCEDURE — C1758 CATHETER, URETERAL: HCPCS | Performed by: UROLOGY

## 2024-03-05 PROCEDURE — 81025 URINE PREGNANCY TEST: CPT | Performed by: UROLOGY

## 2024-03-05 PROCEDURE — 52332 CYSTOSCOPY AND TREATMENT: CPT | Performed by: UROLOGY

## 2024-03-05 PROCEDURE — 87086 URINE CULTURE/COLONY COUNT: CPT | Performed by: UROLOGY

## 2024-03-05 PROCEDURE — NC001 PR NO CHARGE: Performed by: UROLOGY

## 2024-03-05 PROCEDURE — C1769 GUIDE WIRE: HCPCS | Performed by: UROLOGY

## 2024-03-05 RX ORDER — ALBUTEROL SULFATE 2.5 MG/3ML
2.5 SOLUTION RESPIRATORY (INHALATION) ONCE AS NEEDED
Status: DISCONTINUED | OUTPATIENT
Start: 2024-03-05 | End: 2024-03-05 | Stop reason: HOSPADM

## 2024-03-05 RX ORDER — LABETALOL HYDROCHLORIDE 5 MG/ML
10 INJECTION, SOLUTION INTRAVENOUS
Status: DISCONTINUED | OUTPATIENT
Start: 2024-03-05 | End: 2024-03-05 | Stop reason: HOSPADM

## 2024-03-05 RX ORDER — SODIUM CHLORIDE, SODIUM LACTATE, POTASSIUM CHLORIDE, CALCIUM CHLORIDE 600; 310; 30; 20 MG/100ML; MG/100ML; MG/100ML; MG/100ML
50 INJECTION, SOLUTION INTRAVENOUS CONTINUOUS
Status: CANCELLED | OUTPATIENT
Start: 2024-03-05

## 2024-03-05 RX ORDER — HYDROMORPHONE HCL/PF 1 MG/ML
0.5 SYRINGE (ML) INJECTION
Status: DISCONTINUED | OUTPATIENT
Start: 2024-03-05 | End: 2024-03-05 | Stop reason: HOSPADM

## 2024-03-05 RX ORDER — CEFAZOLIN SODIUM 2 G/50ML
2000 SOLUTION INTRAVENOUS ONCE
Status: COMPLETED | OUTPATIENT
Start: 2024-03-05 | End: 2024-03-05

## 2024-03-05 RX ORDER — CEFDINIR 300 MG/1
300 CAPSULE ORAL EVERY 12 HOURS SCHEDULED
Qty: 6 CAPSULE | Refills: 0 | Status: SHIPPED | OUTPATIENT
Start: 2024-03-05 | End: 2024-03-08

## 2024-03-05 RX ORDER — ONDANSETRON 2 MG/ML
INJECTION INTRAMUSCULAR; INTRAVENOUS AS NEEDED
Status: DISCONTINUED | OUTPATIENT
Start: 2024-03-05 | End: 2024-03-05

## 2024-03-05 RX ORDER — METOCLOPRAMIDE HYDROCHLORIDE 5 MG/ML
10 INJECTION INTRAMUSCULAR; INTRAVENOUS ONCE AS NEEDED
Status: DISCONTINUED | OUTPATIENT
Start: 2024-03-05 | End: 2024-03-05 | Stop reason: HOSPADM

## 2024-03-05 RX ORDER — MAGNESIUM HYDROXIDE 1200 MG/15ML
LIQUID ORAL AS NEEDED
Status: DISCONTINUED | OUTPATIENT
Start: 2024-03-05 | End: 2024-03-05 | Stop reason: HOSPADM

## 2024-03-05 RX ORDER — MIDAZOLAM HYDROCHLORIDE 2 MG/2ML
INJECTION, SOLUTION INTRAMUSCULAR; INTRAVENOUS AS NEEDED
Status: DISCONTINUED | OUTPATIENT
Start: 2024-03-05 | End: 2024-03-05

## 2024-03-05 RX ORDER — HYDRALAZINE HYDROCHLORIDE 20 MG/ML
5 INJECTION INTRAMUSCULAR; INTRAVENOUS ONCE AS NEEDED
Status: DISCONTINUED | OUTPATIENT
Start: 2024-03-05 | End: 2024-03-05 | Stop reason: HOSPADM

## 2024-03-05 RX ORDER — PROPOFOL 10 MG/ML
INJECTION, EMULSION INTRAVENOUS AS NEEDED
Status: DISCONTINUED | OUTPATIENT
Start: 2024-03-05 | End: 2024-03-05

## 2024-03-05 RX ORDER — DEXAMETHASONE SODIUM PHOSPHATE 10 MG/ML
INJECTION, SOLUTION INTRAMUSCULAR; INTRAVENOUS AS NEEDED
Status: DISCONTINUED | OUTPATIENT
Start: 2024-03-05 | End: 2024-03-05

## 2024-03-05 RX ORDER — PHENAZOPYRIDINE HYDROCHLORIDE 200 MG/1
200 TABLET, FILM COATED ORAL
Qty: 6 TABLET | Refills: 0 | Status: SHIPPED | OUTPATIENT
Start: 2024-03-05 | End: 2024-03-07

## 2024-03-05 RX ORDER — ONDANSETRON 2 MG/ML
4 INJECTION INTRAMUSCULAR; INTRAVENOUS ONCE AS NEEDED
Status: DISCONTINUED | OUTPATIENT
Start: 2024-03-05 | End: 2024-03-05 | Stop reason: HOSPADM

## 2024-03-05 RX ORDER — OXYBUTYNIN CHLORIDE 5 MG/1
5 TABLET ORAL 3 TIMES DAILY PRN
Qty: 30 TABLET | Refills: 0 | Status: SHIPPED | OUTPATIENT
Start: 2024-03-05 | End: 2024-03-15

## 2024-03-05 RX ORDER — LIDOCAINE HCL/PF 100 MG/5ML
SYRINGE (ML) INJECTION AS NEEDED
Status: DISCONTINUED | OUTPATIENT
Start: 2024-03-05 | End: 2024-03-05

## 2024-03-05 RX ORDER — SODIUM CHLORIDE, SODIUM LACTATE, POTASSIUM CHLORIDE, CALCIUM CHLORIDE 600; 310; 30; 20 MG/100ML; MG/100ML; MG/100ML; MG/100ML
INJECTION, SOLUTION INTRAVENOUS CONTINUOUS PRN
Status: DISCONTINUED | OUTPATIENT
Start: 2024-03-05 | End: 2024-03-05

## 2024-03-05 RX ORDER — FENTANYL CITRATE/PF 50 MCG/ML
50 SYRINGE (ML) INJECTION
Status: DISCONTINUED | OUTPATIENT
Start: 2024-03-05 | End: 2024-03-05 | Stop reason: HOSPADM

## 2024-03-05 RX ORDER — FENTANYL CITRATE 50 UG/ML
INJECTION, SOLUTION INTRAMUSCULAR; INTRAVENOUS AS NEEDED
Status: DISCONTINUED | OUTPATIENT
Start: 2024-03-05 | End: 2024-03-05

## 2024-03-05 RX ADMIN — PROPOFOL 200 MG: 10 INJECTION, EMULSION INTRAVENOUS at 15:38

## 2024-03-05 RX ADMIN — LIDOCAINE HYDROCHLORIDE 100 MG: 20 INJECTION INTRAVENOUS at 15:38

## 2024-03-05 RX ADMIN — FENTANYL CITRATE 50 MCG: 50 INJECTION INTRAMUSCULAR; INTRAVENOUS at 15:55

## 2024-03-05 RX ADMIN — ONDANSETRON 4 MG: 2 INJECTION INTRAMUSCULAR; INTRAVENOUS at 15:55

## 2024-03-05 RX ADMIN — FENTANYL CITRATE 50 MCG: 50 INJECTION INTRAMUSCULAR; INTRAVENOUS at 15:49

## 2024-03-05 RX ADMIN — MIDAZOLAM HYDROCHLORIDE 2 MG: 1 INJECTION, SOLUTION INTRAMUSCULAR; INTRAVENOUS at 15:23

## 2024-03-05 RX ADMIN — CEFAZOLIN SODIUM 2000 MG: 2 SOLUTION INTRAVENOUS at 15:23

## 2024-03-05 RX ADMIN — SODIUM CHLORIDE, SODIUM LACTATE, POTASSIUM CHLORIDE, AND CALCIUM CHLORIDE: .6; .31; .03; .02 INJECTION, SOLUTION INTRAVENOUS at 15:10

## 2024-03-05 RX ADMIN — DEXAMETHASONE SODIUM PHOSPHATE 10 MG: 10 INJECTION INTRAMUSCULAR; INTRAVENOUS at 15:49

## 2024-03-05 NOTE — H&P
H&P Exam - Kacey Kern 33 y.o. female MRN: [id number]    UROLOGY HISTORY AND PHYSICAL     Patient Identifiers: Kacey Kern (MRN [id number])      Date of Service: 3/5/2024        ASSESSMENT:     33 y.o. old female with mild left hydronephrosis with tapering in the distal ureter on retrograde pyelogram managed with stent now going for diagnostic ureteroscopy.      PLAN:       Diagnostic uteroscopy with possible dilation if obstruction is seen    History of Present Illness:     Kacey Kern is a 33 y.o. old with a history of   significant for left uterine artery ligation and subsequently developed LETICIA and imaging concerning for left side hydronephrosis down to an enlarged uterus resulting in stent placement.    The patient had  surgery on 2023 and subsequently was found to have a change in kidney function resulting in CT scan on 2023 which showed mild left-sided hydronephrosis .  The patient denied left-sided pain but given the change in her kidney function and imaging findings there was concern for potential obstruction and patient elected for retrograde pyelogram which showed a 5 mm area of significant tapering in the distal ureter approximately 3 cm from the ureteral orifice which was a transition point as ureter was mildly to moderately dilated proximal to this and therefore a 6 x 24 JJ stent placed with minimal resistance.     Patient's creatinine was 0.9 2024 down from 1.4 during hospitalization.     She was seen in clinic on 2024 and did not have any issues  other than urinary urgency from the stent.  We discussed options of removing stent and following closely with imaging to determine if there is obstruction present versus going to the operating room for ureteroscopic evaluation with consideration for balloon dilation.  She elected to go to the operating room.  Preoperative urine culture was contaminted.        Past Medical, Past Surgical History:      Past Medical History:   Diagnosis Date    Bacterial vaginosis     Bipolar depression ([facility])     Condyloma acuminata     Gastritis     GERD (gastroesophageal reflux disease)     H/O ETOH abuse     Heroin abuse ([facility])     History of cocaine abuse ([facility])     Narcotic abuse ([facility])     Nephrolithiasis     Opioid withdrawal ([facility])     Suicidal ideations     Trichomonal vaginitis 2019    Vulvitis    :    Past Surgical History:   Procedure Laterality Date    APPENDECTOMY      FL RETROGRADE PYELOGRAM  2023    HERNIA REPAIR      KNEE SURGERY      NM  DELIVERY ONLY N/A 2023    Procedure:  SECTION ();  Surgeon: Sharifa Lopez DO;  [facility]: [facility];  Service: Obstetrics    NM CYSTO BLADDER W/URETERAL CATHETERIZATION Left 2023    Procedure: CYSTOSCOPY RETROGRADE PYELOGRAM WITH INSERTION STENT URETERAL;  Surgeon: Tuan Salas MD;  Location: [facility];  Service: Urology    URETERAL STENT PLACEMENT     :    Medications, Allergies:     Current Facility-Administered Medications:     albuterol inhalation solution 2.5 mg, 2.5 mg, Nebulization, Once PRN, Dhruv Sanchez CRNA    ceFAZolin (ANCEF) IVPB (premix in dextrose) 2,000 mg 50 mL, 2,000 mg, Intravenous, Once, Tuan Salas MD    fentaNYL (SUBLIMAZE) injection 50 mcg, 50 mcg, Intravenous, Q5 Min PRN, Dhruv Sanchez CRNA    hydrALAZINE (APRESOLINE) injection 5 mg, 5 mg, Intravenous, Once PRN, Dhruv Sanchez CRNA    HYDROmorphone (DILAUDID) injection 0.5 mg, 0.5 mg, Intravenous, Q10 Min PRN, Dhruv Sanchez CRNA    labetalol (NORMODYNE) injection 10 mg, 10 mg, Intravenous, Q15 Min PRN, Dhruv Sanchez CRNA    metoclopramide (REGLAN) injection 10 mg, 10 mg, Intravenous, Once PRN, Dhruv Sanchez CRNA    ondansetron (ZOFRAN) injection 4 mg, 4 mg, Intravenous, Once PRN, Dhruv Sanchez CRNA    Allergies:  No Known Allergies:    Social and Family History:   Social History:   Social History      Tobacco Use    Smoking status: Some Days     Types: Cigarettes    Smokeless tobacco: Never   Vaping Use    Vaping status: Never Used   Substance Use Topics    Alcohol use: Not Currently     Comment: rarely;  Most recently a couple of shots and few beers    Drug use: Not Currently     Types: Heroin, Marijuana, Fentanyl, Methamphetamines     Comment: opuate use - last 5 months ago.. One year ago meth and Fentanyl   .    Social History     Tobacco Use   Smoking Status Some Days    Types: Cigarettes   Smokeless Tobacco Never       Family History:  Family History   Problem Relation Age of Onset    No Known Problems Mother     No Known Problems Father     Diabetes Maternal Grandmother     Cancer Paternal Grandmother    :     Review of Systems:     General: Fever, chills, or night sweats: negative  Cardiac: Negative for chest pain.    Pulmonary: Negative for shortness of breath.  Gastrointestinal: Abdominal pain negative  Nausea, vomiting, or diarrhea negative  Genitourinary: See HPI above.  Patient does nothave hematuria.  All other systems queried were negative.    Physical Exam:   General: Patient is pleasant and in NAD. Awake and alert  /55   Pulse 58   Temp (!) 97.2 °F (36.2 °C) (Temporal)   Resp 16   SpO2 100%   HEENT:  Normocephalic atraumatic  Cardiac:  Regular rate and rhythm, Peripheral edema: negative  Pulmonary: Non-labored breathing, CTAB  Abdomen: Soft, non-tender, non-distended.  No surgical scars.  No masses, tenderness, hernias noted.    Genitourinary: negative CVA tenderness, neg suprapubic tenderness.  Extremities: normal movement in all 4       Labs:     Lab Results   Component Value Date    HGB 8.6 (L) 12/23/2023    HCT 26.1 (L) 12/23/2023    WBC 7.62 12/23/2023     12/23/2023   ]    Lab Results   Component Value Date     12/28/2015    K 4.5 01/05/2024     01/05/2024    CO2 27 01/05/2024    BUN 12 01/05/2024    CREATININE 0.89 01/05/2024    CALCIUM 9.2 01/05/2024     GLUCOSE 100 12/28/2015   ]    Imaging:   I personally reviewed the images and report of the following studies, and reviewed them with the patient:    Personally reviewed the patient's CT IVP from December 22, 2023 showing hydroureteronephrosis on the left side as well as retrograde pyelograms from stent placement on 12/23/2023.    Thank you for allowing me to participate in this patients’ care.  Please do not hesitate to call with any additional questions.  Tuan Salas MD

## 2024-03-05 NOTE — TELEPHONE ENCOUNTER
Patient underwent left uteroscopy.  There was an obvious area of narrowing associated bullous edema in the distal ureter but it did not appear to be an atrophic stricture and the semirigid ureteroscope could easily passed through this area.  Based on this I elected not to perform balloon dilation.  I am hopeful that this area is not obstructive as contrast drained quickly after retrograde pyelogram.  Retrograde pyelogram did identify tapered area corresponding with bullous edema seen visually in the distal ureter approximately 2 to 3 cm proximal to the ureteral orifice    Placed a stent with string which we will plan to remove in 3 to 5 days.  Will then plan for a MAG3 Lasix renal scan in approximately 6 weeks.

## 2024-03-05 NOTE — ANESTHESIA POSTPROCEDURE EVALUATION
Post-Op Assessment Note    CV Status:  Stable  Pain Score: 0    Pain management: adequate       Mental Status:  Sleepy and arousable   Hydration Status:  Stable   PONV Controlled:  Controlled   Airway Patency:  Patent     Post Op Vitals Reviewed: Yes    No anethesia notable event occurred.    Staff: CRNA               BP   120/57   Temp      Pulse  62   Resp   14   SpO2   100

## 2024-03-05 NOTE — ANESTHESIA PREPROCEDURE EVALUATION
Procedure:  CYSTOSCOPY URETEROSCOPY, RETROGRADE PYELOGRAM AND INSERTION STENT URETERAL POSSIBLE URETERAL BALLOON DILATION (Left: Bladder)    Relevant Problems   CARDIO   (+) Bigeminy   (+) Cardiac arrhythmia   (+) Chest pain   (+) Incomplete right bundle branch block      GI/HEPATIC   (+) GERD (gastroesophageal reflux disease)      /RENAL   (+) LETICIA (acute kidney injury) (HCC)      Other   (+) Bipolar depression (HCC)   (+) History of opioid abuse (HCC)    Left Ventricle: Left ventricular cavity size is normal. Wall thickness  is normal. The left ventricular ejection fraction is 55%. Systolic  function is normal. Wall motion is normal. Diastolic function is normal.    Left Atrium: The atrium is mildly dilated.    Right Atrium: The atrium is mildly dilated.    Mitral Valve: There is mild regurgitation.    Tricuspid Valve: There is mild regurgitation.  Less     Physical Exam    Airway    Mallampati score: II  TM Distance: >3 FB  Neck ROM: full     Dental       Cardiovascular      Pulmonary      Other Findings  post-pubertal.      Anesthesia Plan  ASA Score- 2     Anesthesia Type- general with ASA Monitors.         Additional Monitors:     Airway Plan: LMA.           Plan Factors-    Chart reviewed.                      Induction- intravenous.    Postoperative Plan-     Informed Consent- Anesthetic plan and risks discussed with patient.  I personally reviewed this patient with the CRNA. Discussed and agreed on the Anesthesia Plan with the CRNA..

## 2024-03-05 NOTE — DISCHARGE INSTR - AVS FIRST PAGE
Kacey Kern:    Your surgery went very well.  We did see a area in the distal aspect of your ureter (portion of the kidney tube closer to the bladder) that looked a bit swollen but it did not look like a concerning scar tissue area.  I therefore did not think he had to be dilated and think that you will likely do well when the new stent comes out.  You can remove your stent at home in 5 days via the string coming out of your urethra.  Instructions for how to do this are below.  If you prefer we can also do this in the clinic a via nursing visit and so our nursing team will call you to arrange this.    Please plan to take an antibiotic around the stent removal starting the day before, the day of and finishing the day after stent removal.    It is important to see how well the kidney is draining she will plan for a special x-ray test called a MAG3 Lasix renal scan in about 6 weeks.    Please take your medications as prescribed with caution for comfort.  Most importantly please drink 6-8 glasses of water per day    Please call with any questions or concerns.    Tuan Salas MD  [facility]  [phone]            WHAT IS A STENT?  At the end of the procedure, your doctor may place a stent into your ureter. A stent is a thin, flexible piece of plastic that will hold open your ureter while the remaining small pieces of stone pass. This allows your kidney to drain easily and prevents you from having to “pass” these small stone pieces on your own, which could be painful. The stent is about 12 inches long and looks and feels like a thin piece of spaghetti.    AFTER THE PROCEDURE  After the procedure you may experience the following symptoms. All of these are normal and should resolve within 1 or 2 days after your stent is removed.  Urinary frequency (urinating more often than usual)  Urinary urgency (the sensation that you need to urinate right away)  Painful urination (this can be pain in your  bladder or in your back when  you urinate)  Blood in your urine ( a stent can irritate the lining of your bladder causing it to bleed)  Back/Flank pain, especially with urination    You may receive a prescription for narcotic pain medication after the procedure. You will also receive a prescription for tamsulosin which you will take once a day for 2 weeks to help relax your ureter and decrease stent discomfort. You will also need to purchase a stool softener (i.e. Colace) or mild laxative (i.e. Miralax) as the narcotic pain medication can make you constipated. This is important as constipation can exacerbate stent related symptoms.     STENT REMOVAL  In some cases, your doctor will leave strings attached to your stent. The strings will be taped to your skin after the procedure. The strings will allow you to remove the thin flexible stent while you are at home. Normally, the stent can be removed 3-5 days after your procedure; your physician will tell you the specific date after your procedure.     On the day you are supposed to remove your stent, do the following:  When you wake up in the morning, take 1-2 pain pills with food.  Start your antibiotic pill the morning of schedule stent removal if prescribed  One hour later sit on the toilet or in the bath tub.  Take a deep breath in and while exhaling, pull the string.   Dispose of the stent in the garbage.    Alternatively, you will come back for an office procedure to remove the stent by placing a small camera into your bladder to remove the stent.    During the next 4-8 hours after removing your stent, you may experience additional blood in your urine, pain with urination or back/side pain. You should take the pain medication you were prescribed to help you with the pain, as well as continue the Flomax. If the pain is severe, you are vomiting, and/or have a fever > 101.4 please call the clinic.

## 2024-03-06 NOTE — TELEPHONE ENCOUNTER
Post Op Note    Kacey Kern is a 33 y.o. female s/p left ureterscopy performed 03/05/2024.  Kacey Kern is a patient of Dr. Dr. Salas and is seen at the [place] office.     How would you rate your pain on a scale from 1 to 10, 10 being the worst pain ever? 2  Do you have any difficulty urinating? No  Do you have any other questions or concerns that I can address at this time? Appt confirmed for stent removal. Does report soreness. States mother is on the way to  medications. Advised to take as prescribed and increase fluid intake. Does report seeing blood in urine. Advised normal with stent in place but should call office with any questions or concerns. Did advise to have scan in 6 weeks and follow up as scheduled with Dr. Salas. Pt verbalized understanding.

## 2024-03-06 NOTE — TELEPHONE ENCOUNTER
LM to contact office. VM is full, I was unable to leave message. If she calls back please ask how she is feeling and please confirm appt with nursing to remove stent on Friday. She is also scheduled to see Dr. Salas in 6 weeks and needs a Mag renal scan PTV. Please give pt information to central scheduling to schedule imaging.

## 2024-03-07 LAB — BACTERIA UR CULT: NORMAL

## 2024-03-08 ENCOUNTER — PROCEDURE VISIT (OUTPATIENT)
Dept: UROLOGY | Facility: AMBULATORY SURGERY CENTER | Age: 34
End: 2024-03-08

## 2024-03-08 VITALS
OXYGEN SATURATION: 99 % | HEIGHT: 65 IN | WEIGHT: 206.2 LBS | BODY MASS INDEX: 34.35 KG/M2 | HEART RATE: 71 BPM | DIASTOLIC BLOOD PRESSURE: 80 MMHG | SYSTOLIC BLOOD PRESSURE: 110 MMHG

## 2024-03-08 DIAGNOSIS — N20.0 NEPHROLITHIASIS: Primary | ICD-10-CM

## 2024-03-08 PROCEDURE — 99024 POSTOP FOLLOW-UP VISIT: CPT

## 2024-03-08 NOTE — PROGRESS NOTES
"3/8/2024  Kacey Kern is a 33 y.o. female  [id number]        Diagnosis  Chief Complaint    Nephrolithiasis         Patient is s/p left ureteroscopy with stone extraction on 3/5/24 with Dr. Salas    Plan  Patient will have NM testing completed and will call with results      Procedure Stent with String Removal    Vitals:    03/08/24 1020   BP: 110/80   Pulse: 71   SpO2: 99%   Weight: 93.5 kg (206 lb 3.2 oz)   Height: 5' 5\" (1.651 m)       Stent with string removed intact without difficulty. Reviewed post stent removal symptoms including flank pain, dysuria, and hematuria. Instructed patient to increase oral fluid intake.  Encouraged the use of NSAIDS and other prescribed pain medication as needed for discomfort. Patient instructed to call the office or report to the ER for uncontrolled pain, fever, chills, nausea or vomiting.       Darlene Head RN   "

## 2024-04-22 ENCOUNTER — HOSPITAL ENCOUNTER (OUTPATIENT)
Dept: RADIOLOGY | Facility: HOSPITAL | Age: 34
Discharge: HOME | End: 2024-04-22
Attending: UROLOGY
Payer: COMMERCIAL

## 2024-04-22 DIAGNOSIS — N13.5 URETERAL OBSTRUCTION, LEFT: ICD-10-CM

## 2024-04-22 PROCEDURE — 78708 K FLOW/FUNCT IMAGE W/DRUG: CPT

## 2024-04-22 PROCEDURE — A9562 TC99M MERTIATIDE: HCPCS

## 2024-10-31 NOTE — PATIENT INSTRUCTIONS
Pregnancy at 23 to 26 Weeks   AMBULATORY CARE:   What changes are happening to your body: You are now close to or at the beginning of the third trimester. The third trimester starts at 24 weeks and ends with delivery. As your baby gets larger, you may develop certain symptoms. These may include pain in your back or down the sides of your abdomen. You may also have stretch marks on your abdomen, breasts, thighs, or buttocks. You may also have constipation. Seek care immediately if:   You develop a severe headache that does not go away. You have new or increased vision changes, such as blurred or spotted vision. You have new or increased swelling in your face or hands. You have vaginal spotting or bleeding. Your water broke or you feel warm water gushing or trickling from your vagina. Call your doctor or obstetrician if:   You have abdominal cramps, pressure, or tightening. You have a change in vaginal discharge. You have light bleeding. You have chills or a fever. You have vaginal itching, burning, or pain. You have yellow, green, white, or foul-smelling vaginal discharge. You have pain or burning when you urinate, less urine than usual, or pink or bloody urine. You have questions or concerns about your condition or care. How to care for yourself at this stage of your pregnancy:       Eat a variety of healthy foods. Healthy foods include fruits, vegetables, whole-grain breads, low-fat dairy foods, beans, lean meats, and fish. Drink liquids as directed. Ask how much liquid to drink each day and which liquids are best for you. Limit caffeine to less than 200 milligrams each day. Limit your intake of fish to 2 servings each week. Choose fish low in mercury such as canned light tuna, shrimp, salmon, cod, or tilapia. Do not  eat fish high in mercury such as swordfish, tilefish, jaylan mackerel, and shark. Manage back pain.   Do not stand for long periods of time or lift heavy items. Use good posture while you stand, squat, or bend. Wear low-heeled shoes with good support. Rest may also help to relieve back pain. Ask your healthcare provider about exercises you can do to strengthen your back muscles. Take prenatal vitamins as directed. Your need for certain vitamins and minerals, such as folic acid, increases during pregnancy. Prenatal vitamins provide some of the extra vitamins and minerals you need. Prenatal vitamins may also help to decrease the risk of certain birth defects. Talk to your healthcare provider about exercise. Moderate exercise can help you stay fit. Your healthcare provider will help you plan an exercise program that is safe for you during pregnancy. Do not smoke. Smoking increases your risk of a miscarriage and other health problems during your pregnancy. Smoking can cause your baby to be born too early or weigh less at birth. Ask your healthcare provider for information if you need help quitting. Do not drink alcohol. Alcohol passes from your body to your baby through the placenta. It can affect your baby's brain development and cause fetal alcohol syndrome (FAS). FAS is a group of conditions that causes mental, behavior, and growth problems. Talk to your healthcare provider before you take any medicines. Many medicines may harm your baby if you take them when you are pregnant. Do not take any medicines, vitamins, herbs, or supplements without first talking to your healthcare provider. Never use illegal or street drugs (such as marijuana or cocaine) while you are pregnant. Safety tips during pregnancy:   Avoid hot tubs and saunas. Do not use a hot tub or sauna while you are pregnant, especially during your first trimester. Hot tubs and saunas may raise your baby's temperature and increase the risk of birth defects. Avoid toxoplasmosis. This is an infection caused by eating raw meat or being around infected cat feces.  It can cause birth defects, miscarriages, and other problems. Wash your hands after you touch raw meat. Make sure any meat is well-cooked before you eat it. Avoid raw eggs and unpasteurized milk. Use gloves or ask someone else to clean your cat's litter box while you are pregnant. Changes that are happening with your baby:  By 26 weeks, your baby will weigh about 2 pounds. Your baby will be about 10 inches long from the top of the head to the rump (baby's bottom). Your baby's movements are much stronger now. Your baby's eyes are almost completely formed and can partially open. Your baby also sleeps and wakes up. What you need to know about prenatal care: Your healthcare provider will check your blood pressure and weight. You may also need the following:  A urine test  may also be done to check for sugar and protein. These can be signs of gestational diabetes or infection. Protein in your urine may also be a sign of preeclampsia. Preeclampsia is a condition that can develop during week 20 or later of your pregnancy. It causes high blood pressure, and it can cause problems with your kidneys and other organs. A gestational diabetes screen  may be done. Your healthcare provider may order either a 1-step or 2-step oral glucose tolerance test (OGTT). 1-step OGTT:  Your blood sugar level will be tested after you have not eaten for 8 hours (fasting). You will then be given a glucose drink. Your level will be tested again 1 hour and 2 hours after you finish the drink. 2-step OGTT:  You do not have to fast for the first part of the test. You will have the glucose drink at any time of day. Your blood sugar level will be checked 1 hour later. If your blood sugar is higher than a certain level, another test will be ordered. You will fast and your blood sugar level will be tested. You will have the glucose drink. Your blood will be tested again 1 hour, 2 hours, and 3 hours after you finish the glucose drink.     Fundal height is a measurement of your uterus to check your baby's growth. This number is usually the same as the number of weeks that you have been pregnant. Your baby's heart rate  will be checked. Follow up with your doctor or obstetrician as directed:  Write down your questions so you remember to ask them during your visits. © Copyright [employer] 2022 Information is for End User's use only and may not be sold, redistributed or otherwise used for commercial purposes. The above information is an  only. It is not intended as medical advice for individual conditions or treatments. Talk to your doctor, nurse or pharmacist before following any medical regimen to see if it is safe and effective for you. Detail Level: Generalized Detail Level: Detailed

## (undated) DEVICE — SUT PLAIN 2-0 CTX 27 IN 872H

## (undated) DEVICE — INVIEW CLEAR LEGGINGS: Brand: CONVERTORS

## (undated) DEVICE — CHLORHEXIDINE 4PCT 4 OZ

## (undated) DEVICE — GUIDEWIRE STRGHT TIP 0.035 IN  SOLO PLUS

## (undated) DEVICE — TELFA NON-ADHERENT ABSORBENT DRESSING: Brand: TELFA

## (undated) DEVICE — GLOVE INDICATOR PI UNDERGLOVE SZ 6.5 BLUE

## (undated) DEVICE — LARGE, DISPOSABLE ALEXIS O C-SECTION PROTECTOR - RETRACTOR: Brand: ALEXIS ® O C-SECTION PROTECTOR - RETRACTOR

## (undated) DEVICE — PACK C-SECTION PBDS

## (undated) DEVICE — GLOVE PI ULTRA TOUCH SZ.6.5

## (undated) DEVICE — STERILE LUBRICATING JELLY, TUBE: Brand: HR LUBRICATING JELLY

## (undated) DEVICE — 3M™ TEGADERM™ TRANSPARENT FILM DRESSING FRAME STYLE, 1624W, 2-3/8 IN X 2-3/4 IN (6 CM X 7 CM), 100/CT 4CT/CASE: Brand: 3M™ TEGADERM™

## (undated) DEVICE — CHLORAPREP HI-LITE 26ML ORANGE

## (undated) DEVICE — CATH URETERAL 5FR X 70 CM FLEX TIP POLYUR BARD

## (undated) DEVICE — PACK TUR

## (undated) DEVICE — SPECIMEN CONTAINER STERILE PEEL PACK

## (undated) DEVICE — GLOVE SRG BIOGEL 7.5

## (undated) DEVICE — ABDOMINAL PAD: Brand: DERMACEA

## (undated) DEVICE — SKIN MARKER DUAL TIP WITH RULER CAP, FLEXIBLE RULER AND LABELS: Brand: DEVON

## (undated) DEVICE — UROLOGIC DRAIN BAG: Brand: UNBRANDED

## (undated) DEVICE — GAUZE SPONGES,16 PLY: Brand: CURITY

## (undated) DEVICE — SUT VICRYL 0 CT-1 27 IN J260H

## (undated) DEVICE — PREMIUM DRY TRAY LF: Brand: MEDLINE INDUSTRIES, INC.

## (undated) DEVICE — SUT VICRYL 0 CTX 36 IN J978H

## (undated) DEVICE — MEDI-VAC YANKAUER SUCTION HANDLE W/STRAIGHT TIP & CONTROL VENT: Brand: CARDINAL HEALTH

## (undated) DEVICE — Device

## (undated) DEVICE — SURGIFOAM 7 X 12 SPONGE ABS

## (undated) DEVICE — STERILE SURGICAL LUBRICANT,  TUBE: Brand: SURGILUBE

## (undated) DEVICE — SUT MONOCRYL 0 CTX 36 IN Y398H